# Patient Record
Sex: FEMALE | Race: OTHER | Employment: UNEMPLOYED | URBAN - METROPOLITAN AREA
[De-identification: names, ages, dates, MRNs, and addresses within clinical notes are randomized per-mention and may not be internally consistent; named-entity substitution may affect disease eponyms.]

---

## 2017-01-31 ENCOUNTER — APPOINTMENT (OUTPATIENT)
Dept: ULTRASOUND IMAGING | Age: 27
End: 2017-01-31
Attending: EMERGENCY MEDICINE
Payer: SELF-PAY

## 2017-01-31 ENCOUNTER — HOSPITAL ENCOUNTER (EMERGENCY)
Age: 27
Discharge: HOME | End: 2017-02-01
Attending: EMERGENCY MEDICINE | Admitting: EMERGENCY MEDICINE
Payer: SELF-PAY

## 2017-01-31 ENCOUNTER — APPOINTMENT (OUTPATIENT)
Dept: CT IMAGING | Age: 27
End: 2017-01-31
Attending: EMERGENCY MEDICINE
Payer: SELF-PAY

## 2017-01-31 DIAGNOSIS — R10.84 ABDOMINAL PAIN, GENERALIZED: Primary | ICD-10-CM

## 2017-01-31 DIAGNOSIS — R11.0 NAUSEA WITHOUT VOMITING: ICD-10-CM

## 2017-01-31 LAB
ALBUMIN SERPL BCP-MCNC: 4.1 G/DL (ref 3.5–5)
ALBUMIN/GLOB SERPL: 1 {RATIO} (ref 1.1–2.2)
ALP SERPL-CCNC: 104 U/L (ref 45–117)
ALT SERPL-CCNC: 30 U/L (ref 12–78)
ANION GAP BLD CALC-SCNC: 8 MMOL/L (ref 5–15)
APPEARANCE UR: CLEAR
AST SERPL W P-5'-P-CCNC: 16 U/L (ref 15–37)
BACTERIA URNS QL MICRO: NEGATIVE /HPF
BASOPHILS # BLD AUTO: 0 K/UL (ref 0–0.1)
BASOPHILS # BLD: 0 % (ref 0–1)
BILIRUB SERPL-MCNC: 0.3 MG/DL (ref 0.2–1)
BILIRUB UR QL: NEGATIVE
BUN SERPL-MCNC: 12 MG/DL (ref 6–20)
BUN/CREAT SERPL: 13 (ref 12–20)
CALCIUM SERPL-MCNC: 9.2 MG/DL (ref 8.5–10.1)
CAOX CRY URNS QL MICRO: ABNORMAL
CHLORIDE SERPL-SCNC: 104 MMOL/L (ref 97–108)
CO2 SERPL-SCNC: 26 MMOL/L (ref 21–32)
COLOR UR: ABNORMAL
CREAT SERPL-MCNC: 0.9 MG/DL (ref 0.55–1.02)
EOSINOPHIL # BLD: 0.1 K/UL (ref 0–0.4)
EOSINOPHIL NFR BLD: 1 % (ref 0–7)
EPITH CASTS URNS QL MICRO: ABNORMAL /LPF
ERYTHROCYTE [DISTWIDTH] IN BLOOD BY AUTOMATED COUNT: 13.4 % (ref 11.5–14.5)
GLOBULIN SER CALC-MCNC: 4.1 G/DL (ref 2–4)
GLUCOSE SERPL-MCNC: 95 MG/DL (ref 65–100)
GLUCOSE UR STRIP.AUTO-MCNC: NEGATIVE MG/DL
HCG UR QL: NEGATIVE
HCT VFR BLD AUTO: 44.6 % (ref 35–47)
HGB BLD-MCNC: 15.2 G/DL (ref 11.5–16)
HGB UR QL STRIP: NEGATIVE
KETONES UR QL STRIP.AUTO: NEGATIVE MG/DL
LEUKOCYTE ESTERASE UR QL STRIP.AUTO: NEGATIVE
LIPASE SERPL-CCNC: 198 U/L (ref 73–393)
LYMPHOCYTES # BLD AUTO: 31 % (ref 12–49)
LYMPHOCYTES # BLD: 2.8 K/UL (ref 0.8–3.5)
MCH RBC QN AUTO: 29.9 PG (ref 26–34)
MCHC RBC AUTO-ENTMCNC: 34.1 G/DL (ref 30–36.5)
MCV RBC AUTO: 87.6 FL (ref 80–99)
MONOCYTES # BLD: 0.6 K/UL (ref 0–1)
MONOCYTES NFR BLD AUTO: 7 % (ref 5–13)
NEUTS SEG # BLD: 5.6 K/UL (ref 1.8–8)
NEUTS SEG NFR BLD AUTO: 61 % (ref 32–75)
NITRITE UR QL STRIP.AUTO: NEGATIVE
PH UR STRIP: 6 [PH] (ref 5–8)
PLATELET # BLD AUTO: 211 K/UL (ref 150–400)
POTASSIUM SERPL-SCNC: 3.6 MMOL/L (ref 3.5–5.1)
PROT SERPL-MCNC: 8.2 G/DL (ref 6.4–8.2)
PROT UR STRIP-MCNC: NEGATIVE MG/DL
RBC # BLD AUTO: 5.09 M/UL (ref 3.8–5.2)
RBC #/AREA URNS HPF: ABNORMAL /HPF (ref 0–5)
S PYO AG THROAT QL: NEGATIVE
SODIUM SERPL-SCNC: 138 MMOL/L (ref 136–145)
SP GR UR REFRACTOMETRY: 1.03 (ref 1–1.03)
UA: UC IF INDICATED,UAUC: ABNORMAL
UROBILINOGEN UR QL STRIP.AUTO: 0.2 EU/DL (ref 0.2–1)
WBC # BLD AUTO: 9.1 K/UL (ref 3.6–11)
WBC URNS QL MICRO: ABNORMAL /HPF (ref 0–4)

## 2017-01-31 PROCEDURE — 87880 STREP A ASSAY W/OPTIC: CPT

## 2017-01-31 PROCEDURE — 96361 HYDRATE IV INFUSION ADD-ON: CPT

## 2017-01-31 PROCEDURE — 74011250636 HC RX REV CODE- 250/636: Performed by: EMERGENCY MEDICINE

## 2017-01-31 PROCEDURE — 99284 EMERGENCY DEPT VISIT MOD MDM: CPT

## 2017-01-31 PROCEDURE — 83690 ASSAY OF LIPASE: CPT | Performed by: EMERGENCY MEDICINE

## 2017-01-31 PROCEDURE — 87147 CULTURE TYPE IMMUNOLOGIC: CPT | Performed by: EMERGENCY MEDICINE

## 2017-01-31 PROCEDURE — 96374 THER/PROPH/DIAG INJ IV PUSH: CPT

## 2017-01-31 PROCEDURE — 85025 COMPLETE CBC W/AUTO DIFF WBC: CPT | Performed by: EMERGENCY MEDICINE

## 2017-01-31 PROCEDURE — 36415 COLL VENOUS BLD VENIPUNCTURE: CPT | Performed by: EMERGENCY MEDICINE

## 2017-01-31 PROCEDURE — 76830 TRANSVAGINAL US NON-OB: CPT

## 2017-01-31 PROCEDURE — 81001 URINALYSIS AUTO W/SCOPE: CPT | Performed by: EMERGENCY MEDICINE

## 2017-01-31 PROCEDURE — 87070 CULTURE OTHR SPECIMN AEROBIC: CPT | Performed by: EMERGENCY MEDICINE

## 2017-01-31 PROCEDURE — 74011000258 HC RX REV CODE- 258: Performed by: EMERGENCY MEDICINE

## 2017-01-31 PROCEDURE — 74177 CT ABD & PELVIS W/CONTRAST: CPT

## 2017-01-31 PROCEDURE — 74011636320 HC RX REV CODE- 636/320: Performed by: EMERGENCY MEDICINE

## 2017-01-31 PROCEDURE — 76856 US EXAM PELVIC COMPLETE: CPT

## 2017-01-31 PROCEDURE — 81025 URINE PREGNANCY TEST: CPT

## 2017-01-31 PROCEDURE — 96375 TX/PRO/DX INJ NEW DRUG ADDON: CPT

## 2017-01-31 PROCEDURE — 80053 COMPREHEN METABOLIC PANEL: CPT | Performed by: EMERGENCY MEDICINE

## 2017-01-31 RX ORDER — SODIUM CHLORIDE 0.9 % (FLUSH) 0.9 %
10 SYRINGE (ML) INJECTION
Status: COMPLETED | OUTPATIENT
Start: 2017-01-31 | End: 2017-01-31

## 2017-01-31 RX ORDER — MORPHINE SULFATE 4 MG/ML
4 INJECTION, SOLUTION INTRAMUSCULAR; INTRAVENOUS
Status: COMPLETED | OUTPATIENT
Start: 2017-01-31 | End: 2017-01-31

## 2017-01-31 RX ORDER — MEDROXYPROGESTERONE ACETATE 150 MG/ML
150 INJECTION, SUSPENSION INTRAMUSCULAR ONCE
COMMUNITY
End: 2017-08-22 | Stop reason: ALTCHOICE

## 2017-01-31 RX ORDER — KETOROLAC TROMETHAMINE 30 MG/ML
30 INJECTION, SOLUTION INTRAMUSCULAR; INTRAVENOUS
Status: COMPLETED | OUTPATIENT
Start: 2017-01-31 | End: 2017-01-31

## 2017-01-31 RX ORDER — METOCLOPRAMIDE HYDROCHLORIDE 5 MG/ML
10 INJECTION INTRAMUSCULAR; INTRAVENOUS
Status: COMPLETED | OUTPATIENT
Start: 2017-01-31 | End: 2017-01-31

## 2017-01-31 RX ORDER — ONDANSETRON 2 MG/ML
8 INJECTION INTRAMUSCULAR; INTRAVENOUS ONCE
Status: COMPLETED | OUTPATIENT
Start: 2017-01-31 | End: 2017-01-31

## 2017-01-31 RX ADMIN — METOCLOPRAMIDE 10 MG: 5 INJECTION, SOLUTION INTRAMUSCULAR; INTRAVENOUS at 23:22

## 2017-01-31 RX ADMIN — Medication 4 MG: at 23:22

## 2017-01-31 RX ADMIN — Medication 10 ML: at 22:29

## 2017-01-31 RX ADMIN — KETOROLAC TROMETHAMINE 30 MG: 30 INJECTION, SOLUTION INTRAMUSCULAR at 22:12

## 2017-01-31 RX ADMIN — SODIUM CHLORIDE 100 ML: 900 INJECTION, SOLUTION INTRAVENOUS at 22:29

## 2017-01-31 RX ADMIN — IOPAMIDOL 100 ML: 755 INJECTION, SOLUTION INTRAVENOUS at 22:29

## 2017-01-31 RX ADMIN — SODIUM CHLORIDE 1000 ML: 900 INJECTION, SOLUTION INTRAVENOUS at 22:12

## 2017-01-31 RX ADMIN — ONDANSETRON 8 MG: 2 INJECTION INTRAMUSCULAR; INTRAVENOUS at 22:12

## 2017-01-31 NOTE — Clinical Note
Norco:1 pill every 6 hours for pain. Phenergan:1 pill every 6 hours for nausea if needed. Be aware of sedating effects of phenergan and norco. No alcohol or driving with this medicine.  
Return to ER for any worsening of pain, inability to eat or drink , vomiting, fever

## 2017-02-01 VITALS
SYSTOLIC BLOOD PRESSURE: 115 MMHG | HEART RATE: 64 BPM | OXYGEN SATURATION: 99 % | WEIGHT: 175 LBS | DIASTOLIC BLOOD PRESSURE: 78 MMHG | BODY MASS INDEX: 32.2 KG/M2 | HEIGHT: 62 IN | RESPIRATION RATE: 16 BRPM | TEMPERATURE: 98.6 F

## 2017-02-01 RX ORDER — HYDROCODONE BITARTRATE AND ACETAMINOPHEN 5; 325 MG/1; MG/1
1 TABLET ORAL
Qty: 6 TAB | Refills: 0 | Status: SHIPPED | OUTPATIENT
Start: 2017-02-01 | End: 2017-08-22 | Stop reason: ALTCHOICE

## 2017-02-01 RX ORDER — PROMETHAZINE HYDROCHLORIDE 25 MG/1
25 TABLET ORAL
Qty: 9 TAB | Refills: 0 | Status: SHIPPED | OUTPATIENT
Start: 2017-02-01 | End: 2017-08-22 | Stop reason: ALTCHOICE

## 2017-02-01 NOTE — ED PROVIDER NOTES
HPI Comments: 50-year-old female presents emergency department for evaluation of abdominal pain. The pain has been present since yesterday. Pain waxes and wanes in intensity but has been constant. Pain is described as sharp. Pain is located around the umbilicus. It does not radiate. Patient has associated nausea but no vomiting. No diarrhea no dysuria, frequency or urgency. No vaginal bleeding or vaginal discharge. No fever or chills. No cough or runny nose. Patient does admit to a sore throat. Patient has tried Tylenol with no relief. No aggravating factors. No known sick contacts. Pain rated 10/10. Social hx  Nonsmoker  No alcohol      Patient is a 33 y.o. female presenting with abdominal pain. The history is provided by the patient. Abdominal Pain    Associated symptoms include nausea. Pertinent negatives include no fever, no diarrhea, no vomiting, no dysuria, no frequency, no headaches, no arthralgias, no myalgias and no chest pain. Past Medical History:   Diagnosis Date    Cholestasis of pregnancy in third trimester 11/20/2015    Constipation     Gestational diabetes 9/25/2015    Hypertension      possibly per pt report in [place]. Denied it 5/5/15       History reviewed. No pertinent past surgical history. Family History:   Problem Relation Age of Onset    Diabetes Mother        Social History     Social History    Marital status: SINGLE     Spouse name: N/A    Number of children: N/A    Years of education: N/A     Occupational History    Not on file.      Social History Main Topics    Smoking status: Never Smoker    Smokeless tobacco: Never Used    Alcohol use No    Drug use: No    Sexual activity: Yes     Partners: Male     Birth control/ protection: None, Condom     Other Topics Concern    Not on file     Social History Narrative    ** Merged History Encounter **         ** Merged History Encounter **            ALLERGIES: Review of patient's allergies indicates no known allergies. Review of Systems   Constitutional: Negative for chills and fever. HENT: Positive for sore throat. Negative for congestion, postnasal drip and rhinorrhea. Respiratory: Negative for cough and shortness of breath. Cardiovascular: Negative for chest pain and palpitations. Gastrointestinal: Positive for abdominal pain and nausea. Negative for blood in stool, diarrhea and vomiting. Genitourinary: Negative for dysuria, flank pain, frequency, urgency, vaginal bleeding and vaginal discharge. Musculoskeletal: Negative for arthralgias, myalgias, neck pain and neck stiffness. Skin: Negative for rash and wound. Neurological: Negative for dizziness, numbness and headaches. All other systems reviewed and are negative. Vitals:    01/31/17 2113   BP: 139/86   Pulse: 66   Resp: 20   Temp: 98.5 °F (36.9 °C)   SpO2: 99%   Weight: 79.4 kg (175 lb)   Height: 5' 2\" (1.575 m)            Physical Exam   Constitutional: She is oriented to person, place, and time. She appears well-developed and well-nourished. No distress. HENT:   Head: Normocephalic and atraumatic. Right Ear: External ear normal.   Left Ear: External ear normal.   Nose: Nose normal.   Mouth/Throat: Oropharynx is clear and moist. No oropharyngeal exudate. UVULA MIDLINE, NO TRISMUS, NO DROOLING, NO SUBMANDIBULAR SWELLING, NO TONSILLAR HYPERTROPHY OR EXUDATES, NO MASTOID TENDERNESS, + ERYTHEMA TO POSTERIOR PHARYNX.  PT TOLERATING SECRETIONS. PT ABLE TO SWALLOW WITHOUT PROBLEM. Eyes: Conjunctivae and EOM are normal. Pupils are equal, round, and reactive to light. Neck: Normal range of motion. Neck supple. Cardiovascular: Normal rate and regular rhythm. Pulmonary/Chest: Effort normal and breath sounds normal. No respiratory distress. She has no wheezes. Abdominal: Soft.  Normal appearance and bowel sounds are normal. She exhibits no shifting dullness, no distension, no pulsatile liver, no abdominal bruit, no pulsatile midline mass and no mass. There is no hepatosplenomegaly, splenomegaly or hepatomegaly. There is tenderness. There is no rigidity, no rebound, no guarding, no CVA tenderness, no tenderness at McBurney's point and negative 's sign. Abdomen exposed for exam.  Soft, no peritoneal signs  Tender over umbilicus. -mild   Musculoskeletal: Normal range of motion. She exhibits no edema or tenderness. Lymphadenopathy:     She has no cervical adenopathy. Neurological: She is alert and oriented to person, place, and time. She has normal reflexes. No cranial nerve deficit. She exhibits normal muscle tone. Coordination normal.   Skin: Skin is warm and dry. No rash noted. No erythema. Psychiatric: She has a normal mood and affect. Her behavior is normal. Judgment and thought content normal.   Nursing note and vitals reviewed. MDM  Number of Diagnoses or Management Options  Abdominal pain, generalized:   Nausea without vomiting:   Diagnosis management comments:  34 yo female with periumbilical abdominal pain. Abdomen is soft with no peritoneal signs. She has some mild tenderness above the umbilicus. She is afebrile she is nontoxic appearing. Plan: CT, labs, urinalysis intravenous fluids, pain medicine and nausea medicine    12:43 AM  Pt reevaluated. Pt feeling much better. Patient is well hydrated, well appearing, and in no respiratory distress. Physical exam is reassuring, and without signs of serious illness. Given the patient's history, clinical course, physical exam, and imaging findings, abdominal pain is unlikely secondary to a surgical etiology. Patient will be discharged home with pain control and follow-up with primary care physician in one to two days. Patient and caregivers were instructed on signs and symptoms of reasons to return including fever, worsening pain, vomiting, blood in the stool or any other concerns.     Standard narcotic and sedating medication warnings given  Patient's results have been reviewed with them. Patient and/or family have verbally conveyed their understanding and agreement of the patient's signs, symptoms, diagnosis, treatment and prognosis and additionally agree to follow up as recommended or return to the Emergency Room should their condition change prior to follow-up. Discharge instructions have also been provided to the patient with some educational information regarding their diagnosis as well a list of reasons why they would want to return to the ER prior to their follow-up appointment should their condition change. Amount and/or Complexity of Data Reviewed  Discuss the patient with other providers: yes (ER attending-[facility])    Patient Progress  Patient progress: stable    ED Course       Procedures           Pt case including HPI, PE, and all available lab and radiology results has been discussed with attending physician. Opportunity to evaluate patient has been provided to ER attending. Discharge and prescription plan has been agreed upon.

## 2017-02-01 NOTE — ED NOTES
Assumed care, verbal and [facility] . Pt resting comfortably in bed,  alert and oriented x 4 with no complaints at this time.

## 2017-02-01 NOTE — ED NOTES
Pt discharged from ED with prescription(s) and follow up care instructions; pt and companions verbalized understanding. VSS. NAD. Pt reports no pain at discharge. PT ambulatory from department with steady gait.

## 2017-02-01 NOTE — ED TRIAGE NOTES
Using , pt reports lower abdominal pain and cramping since this morning. Denies urinary or vaginal complaints. Took Tylenol prior to arrival.  Pt is due to for her period, does not believe she is pregnant. Nausea, no vomiting or diarrhea.  Last BM this morning

## 2017-02-02 LAB
BACTERIA SPEC CULT: NORMAL
SERVICE CMNT-IMP: NORMAL

## 2017-02-02 NOTE — ED NOTES
Pharmacy called stating that the pt strep culture came back positive. Pt called and informed of the results and states that she will go to pharmacy to  rx.   Janee Rodriguez PA-C

## 2017-07-09 ENCOUNTER — APPOINTMENT (OUTPATIENT)
Dept: ULTRASOUND IMAGING | Age: 27
End: 2017-07-09
Attending: PHYSICIAN ASSISTANT
Payer: SELF-PAY

## 2017-07-09 ENCOUNTER — HOSPITAL ENCOUNTER (EMERGENCY)
Age: 27
Discharge: HOME | End: 2017-07-10
Attending: EMERGENCY MEDICINE
Payer: SELF-PAY

## 2017-07-09 DIAGNOSIS — R10.2 PELVIC PAIN: Primary | ICD-10-CM

## 2017-07-09 LAB
ALBUMIN SERPL BCP-MCNC: 4.3 G/DL (ref 3.5–5)
ALBUMIN/GLOB SERPL: 1.3 {RATIO} (ref 1.1–2.2)
ALP SERPL-CCNC: 102 U/L (ref 45–117)
ALT SERPL-CCNC: 19 U/L (ref 12–78)
ANION GAP BLD CALC-SCNC: 8 MMOL/L (ref 5–15)
APPEARANCE UR: ABNORMAL
AST SERPL W P-5'-P-CCNC: 14 U/L (ref 15–37)
BACTERIA URNS QL MICRO: NEGATIVE /HPF
BASOPHILS # BLD AUTO: 0 K/UL (ref 0–0.1)
BASOPHILS # BLD: 0 % (ref 0–1)
BILIRUB SERPL-MCNC: 0.2 MG/DL (ref 0.2–1)
BILIRUB UR QL: NEGATIVE
BUN SERPL-MCNC: 15 MG/DL (ref 6–20)
BUN/CREAT SERPL: 24 (ref 12–20)
CALCIUM SERPL-MCNC: 8.6 MG/DL (ref 8.5–10.1)
CHLORIDE SERPL-SCNC: 106 MMOL/L (ref 97–108)
CO2 SERPL-SCNC: 25 MMOL/L (ref 21–32)
COLOR UR: ABNORMAL
CREAT SERPL-MCNC: 0.62 MG/DL (ref 0.55–1.02)
EOSINOPHIL # BLD: 0.1 K/UL (ref 0–0.4)
EOSINOPHIL NFR BLD: 2 % (ref 0–7)
EPITH CASTS URNS QL MICRO: ABNORMAL /LPF
ERYTHROCYTE [DISTWIDTH] IN BLOOD BY AUTOMATED COUNT: 12.7 % (ref 11.5–14.5)
GLOBULIN SER CALC-MCNC: 3.3 G/DL (ref 2–4)
GLUCOSE SERPL-MCNC: 100 MG/DL (ref 65–100)
GLUCOSE UR STRIP.AUTO-MCNC: NEGATIVE MG/DL
HCG UR QL: NEGATIVE
HCT VFR BLD AUTO: 41.5 % (ref 35–47)
HGB BLD-MCNC: 13.8 G/DL (ref 11.5–16)
HGB UR QL STRIP: NEGATIVE
HYALINE CASTS URNS QL MICRO: ABNORMAL /LPF (ref 0–5)
KETONES UR QL STRIP.AUTO: NEGATIVE MG/DL
LEUKOCYTE ESTERASE UR QL STRIP.AUTO: NEGATIVE
LYMPHOCYTES # BLD AUTO: 43 % (ref 12–49)
LYMPHOCYTES # BLD: 3.1 K/UL (ref 0.8–3.5)
MCH RBC QN AUTO: 29.2 PG (ref 26–34)
MCHC RBC AUTO-ENTMCNC: 33.3 G/DL (ref 30–36.5)
MCV RBC AUTO: 87.7 FL (ref 80–99)
MONOCYTES # BLD: 0.4 K/UL (ref 0–1)
MONOCYTES NFR BLD AUTO: 6 % (ref 5–13)
NEUTS SEG # BLD: 3.6 K/UL (ref 1.8–8)
NEUTS SEG NFR BLD AUTO: 49 % (ref 32–75)
NITRITE UR QL STRIP.AUTO: NEGATIVE
PH UR STRIP: 6 [PH] (ref 5–8)
PLATELET # BLD AUTO: 217 K/UL (ref 150–400)
POTASSIUM SERPL-SCNC: 3.7 MMOL/L (ref 3.5–5.1)
PROT SERPL-MCNC: 7.6 G/DL (ref 6.4–8.2)
PROT UR STRIP-MCNC: NEGATIVE MG/DL
RBC # BLD AUTO: 4.73 M/UL (ref 3.8–5.2)
RBC #/AREA URNS HPF: ABNORMAL /HPF (ref 0–5)
SODIUM SERPL-SCNC: 139 MMOL/L (ref 136–145)
SP GR UR REFRACTOMETRY: 1.01 (ref 1–1.03)
UROBILINOGEN UR QL STRIP.AUTO: 0.2 EU/DL (ref 0.2–1)
WBC # BLD AUTO: 7.2 K/UL (ref 3.6–11)
WBC URNS QL MICRO: ABNORMAL /HPF (ref 0–4)

## 2017-07-09 PROCEDURE — 96361 HYDRATE IV INFUSION ADD-ON: CPT

## 2017-07-09 PROCEDURE — 80053 COMPREHEN METABOLIC PANEL: CPT | Performed by: PHYSICIAN ASSISTANT

## 2017-07-09 PROCEDURE — 74011250636 HC RX REV CODE- 250/636: Performed by: PHYSICIAN ASSISTANT

## 2017-07-09 PROCEDURE — 76856 US EXAM PELVIC COMPLETE: CPT

## 2017-07-09 PROCEDURE — 81025 URINE PREGNANCY TEST: CPT

## 2017-07-09 PROCEDURE — 96375 TX/PRO/DX INJ NEW DRUG ADDON: CPT

## 2017-07-09 PROCEDURE — 36415 COLL VENOUS BLD VENIPUNCTURE: CPT | Performed by: PHYSICIAN ASSISTANT

## 2017-07-09 PROCEDURE — 99283 EMERGENCY DEPT VISIT LOW MDM: CPT

## 2017-07-09 PROCEDURE — 81001 URINALYSIS AUTO W/SCOPE: CPT | Performed by: EMERGENCY MEDICINE

## 2017-07-09 PROCEDURE — 76830 TRANSVAGINAL US NON-OB: CPT

## 2017-07-09 PROCEDURE — 96374 THER/PROPH/DIAG INJ IV PUSH: CPT

## 2017-07-09 PROCEDURE — 85025 COMPLETE CBC W/AUTO DIFF WBC: CPT | Performed by: PHYSICIAN ASSISTANT

## 2017-07-09 RX ORDER — KETOROLAC TROMETHAMINE 30 MG/ML
30 INJECTION, SOLUTION INTRAMUSCULAR; INTRAVENOUS
Status: COMPLETED | OUTPATIENT
Start: 2017-07-09 | End: 2017-07-09

## 2017-07-09 RX ADMIN — KETOROLAC TROMETHAMINE 30 MG: 30 INJECTION, SOLUTION INTRAMUSCULAR at 23:23

## 2017-07-09 RX ADMIN — SODIUM CHLORIDE 1000 ML: 900 INJECTION, SOLUTION INTRAVENOUS at 23:23

## 2017-07-10 ENCOUNTER — APPOINTMENT (OUTPATIENT)
Dept: CT IMAGING | Age: 27
End: 2017-07-10
Attending: PHYSICIAN ASSISTANT
Payer: SELF-PAY

## 2017-07-10 VITALS
OXYGEN SATURATION: 98 % | SYSTOLIC BLOOD PRESSURE: 118 MMHG | BODY MASS INDEX: 31.36 KG/M2 | WEIGHT: 188.2 LBS | HEIGHT: 65 IN | HEART RATE: 70 BPM | RESPIRATION RATE: 18 BRPM | TEMPERATURE: 98.1 F | DIASTOLIC BLOOD PRESSURE: 77 MMHG

## 2017-07-10 LAB
C TRACH DNA SPEC QL NAA+PROBE: NEGATIVE
CLUE CELLS VAG QL WET PREP: NORMAL
N GONORRHOEA DNA SPEC QL NAA+PROBE: NEGATIVE
SAMPLE TYPE: NORMAL
SERVICE CMNT-IMP: NORMAL
SPECIMEN SOURCE: NORMAL
T VAGINALIS VAG QL WET PREP: NORMAL

## 2017-07-10 PROCEDURE — 87491 CHLMYD TRACH DNA AMP PROBE: CPT | Performed by: PHYSICIAN ASSISTANT

## 2017-07-10 PROCEDURE — 74011000258 HC RX REV CODE- 258: Performed by: EMERGENCY MEDICINE

## 2017-07-10 PROCEDURE — 74011250636 HC RX REV CODE- 250/636: Performed by: PHYSICIAN ASSISTANT

## 2017-07-10 PROCEDURE — 87210 SMEAR WET MOUNT SALINE/INK: CPT | Performed by: PHYSICIAN ASSISTANT

## 2017-07-10 PROCEDURE — 74011636320 HC RX REV CODE- 636/320: Performed by: EMERGENCY MEDICINE

## 2017-07-10 PROCEDURE — 74177 CT ABD & PELVIS W/CONTRAST: CPT

## 2017-07-10 RX ORDER — NAPROXEN 500 MG/1
500 TABLET ORAL
Qty: 20 TAB | Refills: 0 | Status: SHIPPED | OUTPATIENT
Start: 2017-07-10 | End: 2017-08-22 | Stop reason: ALTCHOICE

## 2017-07-10 RX ORDER — MORPHINE SULFATE 2 MG/ML
4 INJECTION, SOLUTION INTRAMUSCULAR; INTRAVENOUS
Status: COMPLETED | OUTPATIENT
Start: 2017-07-10 | End: 2017-07-10

## 2017-07-10 RX ORDER — SODIUM CHLORIDE 0.9 % (FLUSH) 0.9 %
10 SYRINGE (ML) INJECTION
Status: COMPLETED | OUTPATIENT
Start: 2017-07-10 | End: 2017-07-10

## 2017-07-10 RX ORDER — TRAMADOL HYDROCHLORIDE 50 MG/1
50 TABLET ORAL
Qty: 20 TAB | Refills: 0 | Status: SHIPPED | OUTPATIENT
Start: 2017-07-10 | End: 2017-08-22 | Stop reason: ALTCHOICE

## 2017-07-10 RX ORDER — ONDANSETRON 2 MG/ML
4 INJECTION INTRAMUSCULAR; INTRAVENOUS
Status: COMPLETED | OUTPATIENT
Start: 2017-07-10 | End: 2017-07-10

## 2017-07-10 RX ADMIN — Medication 10 ML: at 02:05

## 2017-07-10 RX ADMIN — ONDANSETRON 4 MG: 2 INJECTION INTRAMUSCULAR; INTRAVENOUS at 01:00

## 2017-07-10 RX ADMIN — IOPAMIDOL 100 ML: 755 INJECTION, SOLUTION INTRAVENOUS at 02:05

## 2017-07-10 RX ADMIN — Medication 4 MG: at 01:00

## 2017-07-10 RX ADMIN — SODIUM CHLORIDE 100 ML: 900 INJECTION, SOLUTION INTRAVENOUS at 02:05

## 2017-07-10 NOTE — DISCHARGE INSTRUCTIONS
Dolor pélvico: Instrucciones de cuidado - [ Pelvic Pain: Care Instructions ]  Instrucciones de cuidado    El dolor pélvico, o dolor en la parte baja del abdomen, puede tener muchas causas. Con frecuencia, el dolor pélvico no es grave y mejora en unos días. Si el dolor continúa o [place], es posible que necesite exámenes y [place]. Hable con garcía médico sobre cualquier síntoma nuevo. Podrían ser señales de un problema grave. La atención de seguimiento es lazarus parte clave de garcía tratamiento y seguridad. Asegúrese de hacer y acudir a todas las citas, y llame a garcía médico si está teniendo problemas. También es lazarus buena idea saber los resultados de los exámenes y mantener lazarus lista de los medicamentos que terrie. ¿Cómo puede cuidarse en el hogar? · Descanse hasta que se sienta mejor. Acuéstese y ponga lazarus almohada debajo de las rodillas para [address] piernas. · [place] abundantes líquidos. Podría descubrir que si terrie sorbos pequeños y frecuentes caen mejor al estómago que si sunita lazarus gran cantidad a la vez. Evite las bebidas carbonatadas o que contengan cafeína, trey las sodas, el té o el café. · Intente comer varias comidas pequeñas, en lugar de 2 o 3 abundantes. Coma alimentos suaves, trey arroz, pan jolynn seco o galletas saladas, bananas (plátanos) y puré de [place]. Evite las comidas grasosas y condimentadas, otras frutas y el alcohol hasta 50 horas después de que desaparezcan los síntomas. · [place] un analgésico (medicamento para el dolor) de venta phillip, trey acetaminofén (Tylenol), ibuprofeno (Advil, Motrin) o naproxeno (Aleve). Nadine y siga todas las instrucciones de la [place]. · No tome dos o más analgésicos al [employer], a menos que el médico se lo haya indicado. Muchos analgésicos contienen acetaminofén, es decir, Tylenol. El exceso de acetaminofén (Tylenol) puede ser dañino. · Puede colocarse lazarus almohadilla térmica, un paño tibio o calor húmedo sobre el abdomen para aliviar el dolor.   ¿Cuándo debe pedir ayuda? Llame al 911 en cualquier momento que considere que necesita atención de [place]. Por ejemplo, llame si:  · Se desmayó (perdió el conocimiento). Llame a garcía médico ahora mismo o busque atención médica inmediata si:  · García dolor empeora. · El dolor comienza a focalizarse en lazarus nguyen del abdomen. · Tiene sangrado vaginal intenso. Intenso significa que empapa las toallas sanitarias o los tampones usuales cada hora, lee 2 o más horas, y elimina coágulos de [place]. · Tiene síntomas nuevos trey fiebre, problemas urinarios o sangrado vaginal inesperado. · Siente mareos o aturdimiento, o que está a punto de [place]. Preste especial atención a los cambios en garcía jean-pierre y asegúrese de comunicarse con garcía médico si:  · No mejora trey se esperaba. ¿Dónde puede encontrar más información en inglés? Claudia Olson a http://negrita-loren.info/. Maria Del Carmen Reyes [id number] en la búsqueda para aprender más acerca de \"Dolor pélvico: Instrucciones de cuidado - [ Pelvic Pain: Care Instructions ]. \"  Revisado: 13 octubre, 2016  Versión del contenido: 11.3  © 7823-0084 Healthwise, Incorporated. Las instrucciones de cuidado fueron adaptadas bajo licencia por [employer] (which disclaims liability or warranty for this information). Si usted tiene [place] afección médica o sobre estas instrucciones, siempre pregunte a garcía profesional de jean-pierre. Healthwise, Incorporated niega toda garantía o responsabilidad por garcía uso de esta información. We hope that we have addressed all of your medical concerns. The examination and treatment you received in the Emergency Department were for an emergent problem and were not intended as complete care. It is important that you follow up with your healthcare provider(s) for ongoing care. If your symptoms worsen or do not improve as expected, and you are unable to reach your usual health care provider(s), you should return to the Emergency Department. Today's healthcare is undergoing tremendous change, and patient satisfaction surveys are one of the many tools to assess the quality of medical care. You may receive a survey from the [employer] regarding your experience in the Emergency Department. I hope that your experience has been completely positive, particularly the medical care that I provided. As such, please participate in the survey; anything less than excellent does not meet my expectations or intentions. [facility] and [facility] participate in nationally recognized quality of care measures. If your blood pressure is greater than 120/80, as reported below, we urge that you seek medical care to address the potential of high blood pressure, commonly known as hypertension. Hypertension can be hereditary or can be caused by certain medical conditions, pain, stress, or \"white coat syndrome. \"       Please make an appointment with your health care provider(s) for follow up of your Emergency Department visit. VITALS:   Patient Vitals for the past 8 hrs:   Temp Pulse Resp BP SpO2   07/09/17 2212 98.1 °F (36.7 °C) (!) 53 18 124/89 99 %          Thank you for allowing us to provide you with medical care today. We realize that you have many choices for your emergency care needs. Please choose us in the future for any continued health care needs. Laweragudelia Gibbonsela, [address] [place] [address] 20.   Office: [phone]            Recent Results (from the past 24 hour(s))   URINALYSIS W/MICROSCOPIC    Collection Time: 07/09/17 11:14 PM   Result Value Ref Range    Color YELLOW/STRAW      Appearance CLOUDY (A) CLEAR      Specific gravity 1.015 1.003 - 1.030      pH (UA) 6.0 5.0 - 8.0      Protein NEGATIVE  NEG mg/dL    Glucose NEGATIVE  NEG mg/dL    Ketone NEGATIVE  NEG mg/dL    Bilirubin NEGATIVE  NEG      Blood NEGATIVE  NEG      Urobilinogen 0.2 0.2 - 1.0 EU/dL    Nitrites NEGATIVE  NEG      Leukocyte Esterase NEGATIVE  NEG      WBC 0-4 0 - 4 /hpf    RBC 0-5 0 - 5 /hpf    Epithelial cells FEW FEW /lpf    Bacteria NEGATIVE  NEG /hpf    Hyaline cast 0-2 0 - 5 /lpf   CBC WITH AUTOMATED DIFF    Collection Time: 07/09/17 11:14 PM   Result Value Ref Range    WBC 7.2 3.6 - 11.0 K/uL    RBC 4.73 3.80 - 5.20 M/uL    HGB 13.8 11.5 - 16.0 g/dL    HCT 41.5 35.0 - 47.0 %    MCV 87.7 80.0 - 99.0 FL    MCH 29.2 26.0 - 34.0 PG    MCHC 33.3 30.0 - 36.5 g/dL    RDW 12.7 11.5 - 14.5 %    PLATELET 978 567 - 160 K/uL    NEUTROPHILS 49 32 - 75 %    LYMPHOCYTES 43 12 - 49 %    MONOCYTES 6 5 - 13 %    EOSINOPHILS 2 0 - 7 %    BASOPHILS 0 0 - 1 %    ABS. NEUTROPHILS 3.6 1.8 - 8.0 K/UL    ABS. LYMPHOCYTES 3.1 0.8 - 3.5 K/UL    ABS. MONOCYTES 0.4 0.0 - 1.0 K/UL    ABS. EOSINOPHILS 0.1 0.0 - 0.4 K/UL    ABS. BASOPHILS 0.0 0.0 - 0.1 K/UL   METABOLIC PANEL, COMPREHENSIVE    Collection Time: 07/09/17 11:14 PM   Result Value Ref Range    Sodium 139 136 - 145 mmol/L    Potassium 3.7 3.5 - 5.1 mmol/L    Chloride 106 97 - 108 mmol/L    CO2 25 21 - 32 mmol/L    Anion gap 8 5 - 15 mmol/L    Glucose 100 65 - 100 mg/dL    BUN 15 6 - 20 MG/DL    Creatinine 0.62 0.55 - 1.02 MG/DL    BUN/Creatinine ratio 24 (H) 12 - 20      GFR est AA >60 >60 ml/min/1.73m2    GFR est non-AA >60 >60 ml/min/1.73m2    Calcium 8.6 8.5 - 10.1 MG/DL    Bilirubin, total 0.2 0.2 - 1.0 MG/DL    ALT (SGPT) 19 12 - 78 U/L    AST (SGOT) 14 (L) 15 - 37 U/L    Alk.  phosphatase 102 45 - 117 U/L    Protein, total 7.6 6.4 - 8.2 g/dL    Albumin 4.3 3.5 - 5.0 g/dL    Globulin 3.3 2.0 - 4.0 g/dL    A-G Ratio 1.3 1.1 - 2.2     HCG URINE, QL. - POC    Collection Time: 07/09/17 11:25 PM   Result Value Ref Range    Pregnancy test,urine (POC) NEGATIVE  NEG     WET PREP    Collection Time: 07/10/17 12:36 AM   Result Value Ref Range    Clue cells CLUE CELLS ABSENT      Wet prep NO TRICHOMONAS SEEN         Ct Abd Pelv W Cont    Result Date: 7/10/2017  EXAM:  CT ABD PELV W CONT INDICATION: Lower pelvic pain for one day  COMPARISON: January 31, 2017 TECHNIQUE: Following the uneventful intravenous administration of 100 cc Isovue-370, thin axial images were obtained through the abdomen and pelvis. Coronal and sagittal reconstructions were generated. Oral contrast was not administered. CT dose reduction was achieved through use of a standardized protocol tailored for this examination and automatic exposure control for dose modulation. FINDINGS: LUNG BASES: Clear. INCIDENTALLY IMAGED HEART AND MEDIASTINUM: Unremarkable. LIVER: No mass or biliary dilatation. GALLBLADDER: Unremarkable. SPLEEN: No mass. PANCREAS: No mass or ductal dilatation. ADRENALS: Unremarkable. KIDNEYS: No mass, calculus, or hydronephrosis. STOMACH: Small hiatal hernia. SMALL BOWEL: No dilatation or wall thickening. COLON: No dilatation or wall thickening. APPENDIX: Normal. PERITONEUM: No ascites or pneumoperitoneum. RETROPERITONEUM: No lymphadenopathy or aortic aneurysm. REPRODUCTIVE ORGANS: Retroverted uterus containing an intrauterine device. Normal ovaries. URINARY BLADDER: No mass or calculus. BONES: No destructive bone lesion. ADDITIONAL COMMENTS: N/A     IMPRESSION: No evidence of acute abdominal or pelvic process. Small hiatal hernia. Intrauterine device within the uterus. Us Transvaginal    Result Date: 7/10/2017  INDICATION: Pelvic pain. Intrauterine device in place. TECHNIQUE: Routine transpelvic ultrasound images were obtained as well as transvaginal images for better definition of the uterus and adnexa. FINDINGS: The transpelvic images demonstrate a distended urinary bladder without evidence of filling defect. The uterus measures 7.6 x 3.4 x 4.4 cm. Myometrial echogenicity is normal. The endometrial stripe measures 5 mm by transpelvic technique. There is an intrauterine device in the endometrial canal. The ovaries are obscured by overlying bowel gas.  No pelvic mass or free fluid is seen Transvaginal images demonstrate a normal endometrial stripe measuring 4 mm in thickness. There is an intrauterine device within the endometrial canal. No myometrial mass is seen. The cervix has a normal appearance. The right ovary is obscured by intervening bowel gas. The left ovary measures 2.5 x 2.3 x 1.9 cm; it demonstrates normal vascularity on color Doppler imaging. No adnexal mass or pathological cyst is seen. There is no pelvic free fluid. IMPRESSION: Normal uterus and endometrial stripe. Intrauterine device lies within the endometrial canal. Normal left ovary. Nonvisualization of the right ovary. No evidence of pelvic mass or free fluid. Us Pelv Non Obs    Result Date: 7/10/2017  INDICATION: Pelvic pain. Intrauterine device in place. TECHNIQUE: Routine transpelvic ultrasound images were obtained as well as transvaginal images for better definition of the uterus and adnexa. FINDINGS: The transpelvic images demonstrate a distended urinary bladder without evidence of filling defect. The uterus measures 7.6 x 3.4 x 4.4 cm. Myometrial echogenicity is normal. The endometrial stripe measures 5 mm by transpelvic technique. There is an intrauterine device in the endometrial canal. The ovaries are obscured by overlying bowel gas. No pelvic mass or free fluid is seen Transvaginal images demonstrate a normal endometrial stripe measuring 4 mm in thickness. There is an intrauterine device within the endometrial canal. No myometrial mass is seen. The cervix has a normal appearance. The right ovary is obscured by intervening bowel gas. The left ovary measures 2.5 x 2.3 x 1.9 cm; it demonstrates normal vascularity on color Doppler imaging. No adnexal mass or pathological cyst is seen. There is no pelvic free fluid. IMPRESSION: Normal uterus and endometrial stripe. Intrauterine device lies within the endometrial canal. Normal left ovary. Nonvisualization of the right ovary.  No evidence of pelvic mass or free fluid.

## 2017-07-10 NOTE — ED PROVIDER NOTES
HPI Comments: 33 y.o. female with past medical history significant for HTN, gestational DM, and cholestasis of pregnancy in third trimester who presents to the ED with chief complaint of pelvic pain. Patient reports pelvic pain, bilateral lower back pain, nausea, and \"two\" episodes of vomiting with onset at ~5640-7046 \"this afternoon. \" Patient reports taking Ibuprofen for her pain. Patient denies a history of similar symptoms. Patient reports having an IUD placed in April of 2017; denies any problems with it since. Patient reports her LMP was ~5 days ago; reports it was \"normal.\" Patient denies diarrhea, fever, chills, upper abdominal pain, dysuria, vaginal discharge, cough, chest pain, and SOB. There are no other acute medical concerns at this time. PCP: Cortney Hernandez MD    Note written by Satnam Breen, as dictated by KELLY Farley 11:13 PM     The history is provided by the patient. Past Medical History:   Diagnosis Date    Cholestasis of pregnancy in third trimester 11/20/2015    Constipation     Gestational diabetes 9/25/2015    Hypertension     possibly per pt report in [place]. Denied it 5/5/15       History reviewed. No pertinent surgical history. Family History:   Problem Relation Age of Onset    Diabetes Mother        Social History     Social History    Marital status: SINGLE     Spouse name: N/A    Number of children: N/A    Years of education: N/A     Occupational History    Not on file. Social History Main Topics    Smoking status: Never Smoker    Smokeless tobacco: Never Used    Alcohol use No    Drug use: No    Sexual activity: Yes     Partners: Male     Birth control/ protection: None, Condom     Other Topics Concern    Not on file     Social History Narrative    ** Merged History Encounter **         ** Merged History Encounter **            ALLERGIES: Review of patient's allergies indicates no known allergies.     Review of Systems Constitutional: Negative. Negative for chills and fever. HENT: Negative for ear discharge. Eyes: Negative for photophobia, pain, discharge and visual disturbance. Respiratory: Negative for apnea, cough, chest tightness and shortness of breath. Cardiovascular: Negative for chest pain, palpitations and leg swelling. Gastrointestinal: Positive for nausea and vomiting. Negative for abdominal distention, abdominal pain and blood in stool. Genitourinary: Positive for pelvic pain. Negative for difficulty urinating, dysuria, flank pain, frequency, hematuria and vaginal discharge. Musculoskeletal: Positive for back pain. Negative for gait problem, joint swelling, myalgias and neck pain. Skin: Negative for color change and pallor. Neurological: Negative for dizziness, syncope, weakness, numbness and headaches. Psychiatric/Behavioral: Negative for behavioral problems and confusion. The patient is not nervous/anxious. Vitals:    07/09/17 2212   BP: 124/89   Pulse: (!) 53   Resp: 18   Temp: 98.1 °F (36.7 °C)   SpO2: 99%   Weight: 85.4 kg (188 lb 3.2 oz)   Height: 5' 5\" (1.651 m)            Physical Exam   Constitutional: She is oriented to person, place, and time. She appears well-developed and well-nourished. She appears distressed. In mild distress. HENT:   Head: Normocephalic and atraumatic. Right Ear: External ear normal.   Left Ear: External ear normal.   Nose: Nose normal.   Mouth/Throat: Oropharynx is clear and moist.   Eyes: Conjunctivae and EOM are normal. Pupils are equal, round, and reactive to light. Right eye exhibits no discharge. Left eye exhibits no discharge. Neck: Normal range of motion. Neck supple. Cardiovascular: Normal rate, regular rhythm, normal heart sounds and intact distal pulses. Pulmonary/Chest: Effort normal and breath sounds normal.   Abdominal: Soft. Bowel sounds are normal. She exhibits no distension. There is tenderness.  There is no rebound and no guarding. Mild suprapubic tenderness. Genitourinary:   Genitourinary Comments: Performed by Robel Solares PA-C. The external vulva and vagina are normal in appearance, without rash, lesions, discharge, ecchymosis or laceration. The speculum exam demonstrates normal vaginal mucosa without rash, lesions, discharge, ecchymosis or laceration. The cervix is normal in appearance without rash, lesions, discharge, ecchymosis or laceration. The bimanual exam demonstrates a(n) closed cervix without cervical motion tenderness. The uterus is not enlarged, and non-tender, without palpable masses. The adenexa are non-tender. KELLY Sanchez     Musculoskeletal: Normal range of motion. She exhibits no edema or tenderness. Neurological: She is alert and oriented to person, place, and time. No cranial nerve deficit. Coordination normal.   Skin: Skin is warm and dry. No rash noted. Psychiatric: She has a normal mood and affect. Her behavior is normal. Judgment and thought content normal.   Nursing note and vitals reviewed. Note written by Satnam Friedman, as dictated by KELLY Sanchez 11:20 PM     MDM  Number of Diagnoses or Management Options     Amount and/or Complexity of Data Reviewed  Clinical lab tests: reviewed and ordered  Tests in the radiology section of CPT®: ordered and reviewed  Discuss the patient with other providers: yes  Independent visualization of images, tracings, or specimens: yes      ED Course       Procedures     Patient has been reassessed. Feeling better. Reviewed labs, medications and radiographics with patient. Ready to discharge home. Discussed case with attending Physician. Agrees with care and will D/C with follow up. Patient's results have been reviewed with them.   Patient and/or family have verbally conveyed their understanding and agreement of the patient's signs, symptoms, diagnosis, treatment and prognosis and additionally agree to follow up as recommended or return to the Emergency Room should their condition change prior to follow-up. Discharge instructions have also been provided to the patient with some educational information regarding their diagnosis as well a list of reasons why they would want to return to the ER prior to their follow-up appointment should their condition change. Recent Results (from the past 24 hour(s))   URINALYSIS W/MICROSCOPIC    Collection Time: 07/09/17 11:14 PM   Result Value Ref Range    Color YELLOW/STRAW      Appearance CLOUDY (A) CLEAR      Specific gravity 1.015 1.003 - 1.030      pH (UA) 6.0 5.0 - 8.0      Protein NEGATIVE  NEG mg/dL    Glucose NEGATIVE  NEG mg/dL    Ketone NEGATIVE  NEG mg/dL    Bilirubin NEGATIVE  NEG      Blood NEGATIVE  NEG      Urobilinogen 0.2 0.2 - 1.0 EU/dL    Nitrites NEGATIVE  NEG      Leukocyte Esterase NEGATIVE  NEG      WBC 0-4 0 - 4 /hpf    RBC 0-5 0 - 5 /hpf    Epithelial cells FEW FEW /lpf    Bacteria NEGATIVE  NEG /hpf    Hyaline cast 0-2 0 - 5 /lpf   CBC WITH AUTOMATED DIFF    Collection Time: 07/09/17 11:14 PM   Result Value Ref Range    WBC 7.2 3.6 - 11.0 K/uL    RBC 4.73 3.80 - 5.20 M/uL    HGB 13.8 11.5 - 16.0 g/dL    HCT 41.5 35.0 - 47.0 %    MCV 87.7 80.0 - 99.0 FL    MCH 29.2 26.0 - 34.0 PG    MCHC 33.3 30.0 - 36.5 g/dL    RDW 12.7 11.5 - 14.5 %    PLATELET 943 721 - 432 K/uL    NEUTROPHILS 49 32 - 75 %    LYMPHOCYTES 43 12 - 49 %    MONOCYTES 6 5 - 13 %    EOSINOPHILS 2 0 - 7 %    BASOPHILS 0 0 - 1 %    ABS. NEUTROPHILS 3.6 1.8 - 8.0 K/UL    ABS. LYMPHOCYTES 3.1 0.8 - 3.5 K/UL    ABS. MONOCYTES 0.4 0.0 - 1.0 K/UL    ABS. EOSINOPHILS 0.1 0.0 - 0.4 K/UL    ABS.  BASOPHILS 0.0 0.0 - 0.1 K/UL   METABOLIC PANEL, COMPREHENSIVE    Collection Time: 07/09/17 11:14 PM   Result Value Ref Range    Sodium 139 136 - 145 mmol/L    Potassium 3.7 3.5 - 5.1 mmol/L    Chloride 106 97 - 108 mmol/L    CO2 25 21 - 32 mmol/L    Anion gap 8 5 - 15 mmol/L    Glucose 100 65 - 100 mg/dL    BUN 15 6 - 20 MG/DL    Creatinine 0.62 0.55 - 1.02 MG/DL    BUN/Creatinine ratio 24 (H) 12 - 20      GFR est AA >60 >60 ml/min/1.73m2    GFR est non-AA >60 >60 ml/min/1.73m2    Calcium 8.6 8.5 - 10.1 MG/DL    Bilirubin, total 0.2 0.2 - 1.0 MG/DL    ALT (SGPT) 19 12 - 78 U/L    AST (SGOT) 14 (L) 15 - 37 U/L    Alk. phosphatase 102 45 - 117 U/L    Protein, total 7.6 6.4 - 8.2 g/dL    Albumin 4.3 3.5 - 5.0 g/dL    Globulin 3.3 2.0 - 4.0 g/dL    A-G Ratio 1.3 1.1 - 2.2     HCG URINE, QL. - POC    Collection Time: 07/09/17 11:25 PM   Result Value Ref Range    Pregnancy test,urine (POC) NEGATIVE  NEG     WET PREP    Collection Time: 07/10/17 12:36 AM   Result Value Ref Range    Clue cells CLUE CELLS ABSENT      Wet prep NO TRICHOMONAS SEEN         Ct Abd Pelv W Cont    Result Date: 7/10/2017  EXAM:  CT ABD PELV W CONT INDICATION: Lower pelvic pain for one day  COMPARISON: January 31, 2017 TECHNIQUE: Following the uneventful intravenous administration of 100 cc Isovue-370, thin axial images were obtained through the abdomen and pelvis. Coronal and sagittal reconstructions were generated. Oral contrast was not administered. CT dose reduction was achieved through use of a standardized protocol tailored for this examination and automatic exposure control for dose modulation. FINDINGS: LUNG BASES: Clear. INCIDENTALLY IMAGED HEART AND MEDIASTINUM: Unremarkable. LIVER: No mass or biliary dilatation. GALLBLADDER: Unremarkable. SPLEEN: No mass. PANCREAS: No mass or ductal dilatation. ADRENALS: Unremarkable. KIDNEYS: No mass, calculus, or hydronephrosis. STOMACH: Small hiatal hernia. SMALL BOWEL: No dilatation or wall thickening. COLON: No dilatation or wall thickening. APPENDIX: Normal. PERITONEUM: No ascites or pneumoperitoneum. RETROPERITONEUM: No lymphadenopathy or aortic aneurysm. REPRODUCTIVE ORGANS: Retroverted uterus containing an intrauterine device. Normal ovaries. URINARY BLADDER: No mass or calculus.  BONES: No destructive bone lesion. ADDITIONAL COMMENTS: N/A     IMPRESSION: No evidence of acute abdominal or pelvic process. Small hiatal hernia. Intrauterine device within the uterus. Us Transvaginal    Result Date: 7/10/2017  INDICATION: Pelvic pain. Intrauterine device in place. TECHNIQUE: Routine transpelvic ultrasound images were obtained as well as transvaginal images for better definition of the uterus and adnexa. FINDINGS: The transpelvic images demonstrate a distended urinary bladder without evidence of filling defect. The uterus measures 7.6 x 3.4 x 4.4 cm. Myometrial echogenicity is normal. The endometrial stripe measures 5 mm by transpelvic technique. There is an intrauterine device in the endometrial canal. The ovaries are obscured by overlying bowel gas. No pelvic mass or free fluid is seen Transvaginal images demonstrate a normal endometrial stripe measuring 4 mm in thickness. There is an intrauterine device within the endometrial canal. No myometrial mass is seen. The cervix has a normal appearance. The right ovary is obscured by intervening bowel gas. The left ovary measures 2.5 x 2.3 x 1.9 cm; it demonstrates normal vascularity on color Doppler imaging. No adnexal mass or pathological cyst is seen. There is no pelvic free fluid. IMPRESSION: Normal uterus and endometrial stripe. Intrauterine device lies within the endometrial canal. Normal left ovary. Nonvisualization of the right ovary. No evidence of pelvic mass or free fluid. Us Pelv Non Obs    Result Date: 7/10/2017  INDICATION: Pelvic pain. Intrauterine device in place. TECHNIQUE: Routine transpelvic ultrasound images were obtained as well as transvaginal images for better definition of the uterus and adnexa. FINDINGS: The transpelvic images demonstrate a distended urinary bladder without evidence of filling defect. The uterus measures 7.6 x 3.4 x 4.4 cm. Myometrial echogenicity is normal. The endometrial stripe measures 5 mm by transpelvic technique. There is an intrauterine device in the endometrial canal. The ovaries are obscured by overlying bowel gas. No pelvic mass or free fluid is seen Transvaginal images demonstrate a normal endometrial stripe measuring 4 mm in thickness. There is an intrauterine device within the endometrial canal. No myometrial mass is seen. The cervix has a normal appearance. The right ovary is obscured by intervening bowel gas. The left ovary measures 2.5 x 2.3 x 1.9 cm; it demonstrates normal vascularity on color Doppler imaging. No adnexal mass or pathological cyst is seen. There is no pelvic free fluid. IMPRESSION: Normal uterus and endometrial stripe. Intrauterine device lies within the endometrial canal. Normal left ovary. Nonvisualization of the right ovary. No evidence of pelvic mass or free fluid.

## 2017-07-10 NOTE — ED TRIAGE NOTES
Triage: Patient arrives ambulatory from home with c/o lower pelvic and back pain x 1 day. Denies vaginal bleeding/dishcharge, dysuria. Patient reports her OB GYN recently placed an IUD.

## 2017-08-22 ENCOUNTER — OFFICE VISIT (OUTPATIENT)
Dept: FAMILY MEDICINE CLINIC | Age: 27
End: 2017-08-22

## 2017-08-22 VITALS
WEIGHT: 180 LBS | BODY MASS INDEX: 29.99 KG/M2 | HEART RATE: 66 BPM | OXYGEN SATURATION: 100 % | HEIGHT: 65 IN | SYSTOLIC BLOOD PRESSURE: 109 MMHG | DIASTOLIC BLOOD PRESSURE: 75 MMHG | RESPIRATION RATE: 16 BRPM | TEMPERATURE: 98.3 F

## 2017-08-22 DIAGNOSIS — K59.00 CONSTIPATION, UNSPECIFIED CONSTIPATION TYPE: Primary | ICD-10-CM

## 2017-08-22 RX ORDER — POLYETHYLENE GLYCOL 3350 17 G/17G
17 POWDER, FOR SOLUTION ORAL
Qty: 30 EACH | Refills: 0 | Status: SHIPPED | OUTPATIENT
Start: 2017-08-22 | End: 2019-09-27 | Stop reason: SDUPTHER

## 2017-08-22 NOTE — PATIENT INSTRUCTIONS
Estreñimiento: Instrucciones de cuidado - [ Constipation: Care Instructions ]  Instrucciones de cuidado  Tener estreñimiento significa que usted tiene dificultades para eliminar las heces (evacuaciones del intestino). Las personas eliminan heces entre 3 veces al día y [facility] vez cada 3 días. Lo que es normal para usted puede ser [employer]. El estreñimiento puede ocurrir con dolor en el recto y cólicos. El dolor podría empeorar cuando trata de eliminar las heces. A veces hay pequeñas cantidades de chet magaly viva en el papel higiénico o en la superficie de las heces. [place] se debe a las venas dilatadas cerca del recto (hemorroides). Algunos cambios en garcía Ulysses Hopes y estilo de everett podrían ayudarle a evitar el estreñimiento continuo. Es posible que el médico además le recete medicamentos para ayudar a aflojar las heces. Algunos medicamentos pueden causar estreñimiento. [place] incluye los analgésicos (medicamentos para el dolor) y los antidepresivos. Infórmele a garcía médico sobre Steve Mike que usted terrie. Es posible que garcía médico quiera cambiar un medicamento para aliviar trent síntomas. La atención de seguimiento es lazarus parte clave de garcía tratamiento y seguridad. Asegúrese de hacer y acudir a todas las citas, y llame a garcía médico si está teniendo problemas. También es lazarus buena idea saber los resultados de los exámenes y mantener lazarus lista de los medicamentos que terrie. ¿Cómo puede cuidarse en el hogar? · Mary Anne abundantes líquidos, los suficientes trey para que garcía orina sea de color amarillo anish o transparente trey el agua. Si tiene [employer], del corazón o del hígado y tiene que Oakes's líquidos, hable con garcía médico antes de aumentar garcía consumo. · Incluya en garcía dieta diaria alimentos ricos en fibra. Estos incluyen frutas, verduras, frijoles (habichuelas) y granos integrales. · Julián por lo menos 30 minutos de ejercicio la mayoría de los días de la [place]. Caminar es lazarus buena opción. Es posible que también quiera hacer otras actividades, trey correr, nadar, [employer], o jugar al tenis u otros deportes de equipo. · [place] un suplemento de [place], trey Citrucel o Metamucil, todos los GRASSE. Nadine y siga todas las indicaciones de la [place]. · Programe tiempo todos los días para evacuar el intestino. Ayden triplett podría ayudar. Tómese solomon tiempo para evacuar el intestino. · Apoye los pies sobre un banco o taburete pequeño cuando se siente en el inodoro. [place] ayuda a flexionar las caderas y coloca la pelvis en posición de cuclillas. · Solomon médico podría recomendarle un laxante de venta phillip para aliviar el estreñimiento. Staci Gaxiola son Radhika Kepm de Magnesia (Milk of Magnesia) y [place]. Nadine y siga todas las instrucciones de la [place]. No use laxantes de [employer]. ¿Cuándo debe pedir ayuda? Llame a solomon médico ahora mismo o busque atención médica inmediata si:  · Tiene dolor abdominal nuevo o peor. · Tiene náuseas o vómito nuevos o peores. · Tiene chet en las heces. Preste especial atención a los cambios en solomon jean-pierre y asegúrese de comunicarse con solomon médico si:  · Solomon estreñimiento empeora. · No mejora trey se esperaba. ¿Dónde puede encontrar más información en inglés? Fiorella doty http://negrita-loren.info/. Escriba P343 en la búsqueda para aprender Conrado Limb de \"Estreñimiento: Instrucciones de cuidado - [ Constipation: Care Instructions ]. \"  Revisado: 20 marzo, 2017  Versión del contenido: 11.3  © 4525-6234 VISUAL NACERT, Chideo. Las instrucciones de cuidado fueron adaptadas bajo licencia por [employer] (which disclaims liability or warranty for this information). Si usted tiene [place] afección médica o sobre estas instrucciones, siempre pregunte a solomon profesional de jean-pierre. VISUAL NACERT, Chideo niega toda garantía o responsabilidad por solomon uso de esta información.

## 2017-08-22 NOTE — PROGRESS NOTES
[address] [place] [address] with [address] [place] [address]     Chief Complaint: \"I am constipated. \"    Tim Ferguson is an 33 y.o. female with a history of obesity and gestational diabetes mellitus who presents for constipation. The patient is Burundian speaking, [employer]  [id number] used to complete this encounter. The patient reports that for her entire life, she has had difficulty passing her bowels. She notes that she often goes 2-3 weeks between bowel movements. When she does have bowel movements, they are large, painful, and sometimes streaked with blood. She reports that she does get good amounts of fiber in her diet, and does not think that any of her symptoms are diet related at all. She has not tried any medical therapies for this problem. The patient reports that her last BM was yesterday. She is not in any pain today. No fevers, chills, nausea, vomiting. There is no associated diarrhea. Allergies - reviewed:   No Known Allergies    Medications - reviewed:   Current Outpatient Prescriptions   Medication Sig    psyllium seed-sucrose (METAMUCIL, SUGAR,) powd Take 1 teaspoon three times a day with meals.  polyethylene glycol (MIRALAX) 17 gram packet Take 1 Packet by mouth daily as needed.  levonorgestrel (MIRENA) 20 mcg/24 hr (5 years) IUD 1 Each by IntraUTERine route once. No current facility-administered medications for this visit. I have reviewed and updated the histories as listed below:    Past Medical History - reviewed:  Past Medical History:   Diagnosis Date    Cholestasis of pregnancy in third trimester 11/20/2015    Constipation     Gestational diabetes 9/25/2015    Hypertension     possibly per pt report in [place]. Denied it 5/5/15         Past Surgical History - reviewed:   No past surgical history on file.       Social History - reviewed:  Social History     Social History    Marital status: SINGLE Spouse name: N/A    Number of children: N/A    Years of education: N/A     Occupational History    Not on file. Social History Main Topics    Smoking status: Never Smoker    Smokeless tobacco: Never Used    Alcohol use No    Drug use: No    Sexual activity: Yes     Partners: Male     Birth control/ protection: None, Condom     Other Topics Concern    Not on file     Social History Narrative    ** Merged History Encounter **         ** Merged History Encounter **            Family History - reviewed:  Family History   Problem Relation Age of Onset    Diabetes Mother          Immunizations - reviewed:   Immunization History   Administered Date(s) Administered    Influenza Vaccine (Quad) PF 10/02/2015    Influenza Vaccine Split 10/17/2012    Tdap 09/04/2015     Review of Systems  Review of Systems   Constitutional: Negative for activity change and fever. Respiratory: Negative for shortness of breath. Cardiovascular: Negative for chest pain. Gastrointestinal: Positive for constipation. Negative for abdominal pain (none today.), diarrhea, nausea and vomiting. Genitourinary: Negative for dysuria. Physical Exam    Visit Vitals    /75    Pulse 66    Temp 98.3 °F (36.8 °C) (Oral)    Resp 16    Ht 5' 5\" (1.651 m)    Wt 180 lb (81.6 kg)    SpO2 100%    BMI 29.95 kg/m2       Physical Exam   Constitutional: She is oriented to person, place, and time. She appears well-developed and well-nourished. No distress. HENT:   Head: Normocephalic. Neck: Normal range of motion. No thyromegaly present. Cardiovascular: Normal rate, regular rhythm, normal heart sounds and intact distal pulses. Exam reveals no gallop and no friction rub. No murmur heard. Pulmonary/Chest: Effort normal and breath sounds normal. No respiratory distress. She has no wheezes. She has no rales. She exhibits no tenderness. Abdominal: Soft. Bowel sounds are normal. She exhibits no distension and no mass.  There is tenderness (generalized tenderness to deep palpation. ). There is no rebound and no guarding. Lymphadenopathy:     She has no cervical adenopathy. Neurological: She is alert and oriented to person, place, and time. Skin: Skin is warm and dry. She is not diaphoretic. Vitals reviewed. Assessment    ICD-10-CM ICD-9-CM    1. Constipation, unspecified constipation type K59.00 564.00 psyllium seed-sucrose (METAMUCIL, SUGAR,) powd      polyethylene glycol (MIRALAX) 17 gram packet     Plan  1. Constipation, unspecified constipation type - patient has not tried any medical therapies for this problem. I recommended starting metamucil three times daily. I also provided a script for miralax for her to take daily as needed. She is to follow up in a few weeks if she does not have much relief with this therapy. - psyllium seed-sucrose (METAMUCIL, SUGAR,) powd; Take 1 teaspoon three times a day with meals. Dispense: 300 g; Refill: 1  - polyethylene glycol (MIRALAX) 17 gram packet; Take 1 Packet by mouth daily as needed. Dispense: 30 Each; Refill: 0    Follow-up Disposition: Not on File    I have discussed the diagnosis with the patient and the intended plan as seen in the above orders. Patient verbalized understanding of the plan and agrees with the plan. The patient has received an after-visit summary and questions were answered concerning future plans. I have discussed medication side effects and warnings with the patient as well. Informed patient to return to the office if new symptoms arise. Patient was discussed with Dr. Kenya Perla.     Sindhu Ralph MD  Family Medicine Resident

## 2017-08-22 NOTE — MR AVS SNAPSHOT
Visit Information Kaley Sol y Justina Personal Médico Departamento Teléfono del Dep. Número de visita 8/22/2017  3:40 PM Garrick Owens MD [address] [facility] [phone] Upcoming Health Maintenance Date Due  
 HPV AGE 9Y-34Y (1 of 3 - Female 3 Dose Series) 1/15/2002 INFLUENZA AGE 9 TO ADULT 8/1/2017 PAP AKA CERVICAL CYTOLOGY 5/21/2018 DTaP/Tdap/Td series (2 - Td) 9/4/2025 Alergias  Review Complete El: 8/22/2017 Por: Marci Hong LPN A partir del:  8/22/2017 No Known Allergies Vacunas actuales Revisadas el:  9/4/2015 Ivone Freireh Influenza Vaccine (Quad) PF 10/2/2015 Influenza Vaccine Split 10/17/2012 Tdap 9/4/2015 No revisadas esta visita You Were Diagnosed With   
  
 Noreene Mode Constipation, unspecified constipation type    -  Primary ICD-10-CM: K59.00 ICD-9-CM: 564.00 Partes vitales PS Pulso Temperatura Resp [place] ( percentil de crecimiento) Peso (percentil de crecimiento) 109/75 66 98.3 °F (36.8 °C) (Oral) 16 5' 5\" (1.651 m) 180 lb (81.6 kg) LMP (última madi) SpO2 BMI (IMC) Estado obstétrico Estatus de tabaquísmo 08/01/2017 100% 29.95 kg/m2 Unknown Never Smoker BMI and BSA Data Body Mass Index Body Surface Area  
 29.95 kg/m 2 1.93 m 2 [facility]ACY #[id number]- [place], [address] [facility] [phone] Solomon lista de medicamentos actualizada Lista actualizada el: 8/22/17  4:50 PM.  Christina Carter use solomon lista de medicamentos más reciente. DEPO-PROVERA 150 mg/mL Syrg Medicamento genérico:  medroxyPROGESTERone 150 mg by IntraMUSCular route once. HYDROcodone-acetaminophen 5-325 mg per tablet También conocido trey:  Billye [place] Take 1 Tab by mouth every six (6) hours as needed for Pain. Max Daily Amount: 4 Tabs. naproxen 500 mg tablet También conocido trey:  NAPROSYN Take 1 Tab by mouth every twelve (12) hours as needed for Pain.  
  
 polyethylene glycol 17 gram packet También conocido trey:  Ottoniel Barrs Take 1 Packet by mouth daily as needed. promethazine 25 mg tablet También conocido trey: PHENERGAN Take 1 Tab by mouth every six (6) hours as needed. psyllium seed-sucrose Powd También conocido trey:  METAMUCIL (SUGAR) Take 1 teaspoon three times a day with meals. traMADol 50 mg tablet También conocido trey:  ULTRAM  
Take 1 Tab by mouth every six (6) hours as needed for Pain. Max Daily Amount: 200 mg. Recetas Enviado a la William Refills  
 psyllium seed-sucrose (METAMUCIL, SUGAR,) powd 1 Sig: Take 1 teaspoon three times a day with meals. Class: Normal  
 Pharmacy: [facility]/pharmacy #[id number] Ph #: [phone]  
 polyethylene glycol (MIRALAX) 17 gram packet 0 Sig: Take 1 Packet by mouth daily as needed. Class: Normal  
 Pharmacy: [facility]/pharmacy #[id number] Ph #: [phone] Route: Oral  
  
Instrucciones para el Paciente Estreñimiento: Instrucciones de cuidado - [ Constipation: Care Instructions ] Instrucciones de cuidado Tener estreñimiento significa que usted tiene dificultades para eliminar las heces (evacuaciones del intestino). Las personas eliminan heces entre 3 veces al día y Amoret vez cada 3 días. Lo que es normal para usted puede ser [place] [employer]. El estreñimiento puede ocurrir con dolor en el recto y cólicos. El dolor podría empeorar cuando trata de eliminar las heces. A veces hay pequeñas cantidades de chet magaly viva en el papel higiénico o en la superficie de las heces. [place] se debe a las venas dilatadas cerca del recto (hemorroides).  
Algunos cambios en agrcía Lynnette Stover y estilo de everett podrían ayudarle a evitar el estreñimiento continuo. Es posible que el médico además le recete medicamentos para ayudar a aflojar las heces. Algunos medicamentos pueden causar estreñimiento. El [place] incluye los analgésicos (medicamentos para el dolor) y los antidepresivos. Infórmele a garcía médico sobre [employer] Rashid que usted terrie. Es posible que garcía médico quiera cambiar un medicamento para aliviar trent síntomas. La atención de seguimiento es lazarus parte clave de garcía tratamiento y seguridad. Asegúrese de hacer y acudir a todas las citas, y llame a garcía médico si está teniendo problemas. También es lazarus buena idea saber los resultados de los exámenes y mantener lazarus lista de los medicamentos que terrie. Cómo puede cuidarse en el hogar? · Mary Anne abundantes líquidos, los suficientes trey para que garcía orina sea de color amarillo anish o transparente trey el agua. Si tiene [employer], del corazón o del hígado y tiene que Chai's líquidos, hable con garcía médico antes de aumentar garcía consumo. · Incluya en garcía dieta diaria alimentos ricos en fibra. Estos incluyen frutas, verduras, frijoles (habichuelas) y granos integrales. · Julián por lo menos 30 minutos de ejercicio la mayoría de los días de la [place]. Caminar es lazarus buena opción. Es posible que también quiera hacer otras actividades, trey correr, nadar, [employer], o jugar al tenis u otros deportes de equipo. · Leakey un suplemento de Gretchen, trey Citrucel o Metamucil, todos los GRASSE. Nadine y siga todas las indicaciones de la [place]. · Programe tiempo todos los días para evacuar el intestino. [place] rutina diaria podría ayudar. Tómese garcía tiempo para evacuar el intestino. · Apoye los pies sobre un banco o taburete pequeño cuando se siente en el inodoro. El Cenizo ayuda a flexionar las caderas y coloca la pelvis en posición de cuclillas. · García médico podría recomendarle un laxante de venta phillip para aliviar el estreñimiento.  Adelfo Monsivais son [place] Beaulieu de Magnesia (Milk of Magnesia) y MiraLax. Nadine y siga todas las instrucciones de la [place]. No use laxantes de [employer]. Cuándo debe pedir ayuda? Llame a solomon médico ahora mismo o busque atención médica inmediata si: · Tiene dolor abdominal nuevo o peor. · Tiene náuseas o vómito nuevos o peores. · Tiene chet en las heces. Preste especial atención a los cambios en solomon jean-pierre y asegúrese de comunicarse con solomon médico si: 
· Solomon estreñimiento empeora. · No mejora trey se esperaba. Dónde puede encontrar más información en inglés? Tommy Husain a [email or web address] Escriba P343 en la búsqueda para aprender Burnard Rumpf de \"Estreñimiento: Instrucciones de cuidado - [ Constipation: Care Instructions ]. \" 
Revisado: 20 marzo, 2017 Versión del contenido: 11.3 © 7405-2146 Healthwise, Incorporated. Las instrucciones de cuidado fueron adaptadas bajo licencia por [employer] (which disclaims liability or warranty for this information). Si usted tiene Taney Healy afección médica o sobre estas instrucciones, siempre pregunte a solomon profesional de jean-pierre. Healthwise, Incorporated niega toda garantía o responsabilidad por solomon uso de esta información. Introducing [facility]! [facility] introduce portal paciente MyChart . Ahora se puede acceder a partes de solomon expediente médico, enviar por correo electrónico la oficina de solomon médico y solicitar renovaciones de medicamentos en línea. En solomon navegador de Internet , Darion Rodriguez a [email or web address] Julián cristian en el usuario por Dora Leyva? [place] cristian aquí en la sesión Elle Hewitt. Verá la página de registro [place]. Ingrese solomon código de [facility] mary y trey aparece a continuación. Usted no tendrá que [employer] código después de jose f completado el proceso de registro . Si cooper no se inscribe antes de la fecha de caducidad , debe solicitar un nuevo código.  
 
· MyChart Código de acceso : [id number] 
 Expires: 10/8/2017  2:38 AM 
 
Ingresa los últimos cuatro dígitos de garcía Número de Seguro Social ( xxxx ) y fecha de nacimiento ( dd / mm / aaaa ) tery se indica y julián clic en Enviar. Usted será llevado a la siguiente página de registro . Crear un ID MyChart . Esta será garcía ID de inicio de sesión de MyChart y no puede ser [place] , por lo que pensar en lazarus que es Laurance Xiomara y fácil de recordar . Crear lazarus contraseña MyChart . Usted puede cambiar garcía contraseña en cualquier momento . Ingrese garcía Password Reset de preguntas y Mart[place] . [place] se puede utilizar en un momento posterior si usted olvida garcía contraseña. Introduzca garcía dirección de correo electrónico . Ingrid Benedict recibirá lazarus notificación por correo electrónico cuando la nueva información está disponible en MyChart . Ce Late clic en Registrarse. [place] anita y descargar porciones de garcía expediente médico. 
Julián clic en el enlace de descarga del menú Resumen para descargar lazarus copia portátil de garcía información médica . Si tiene January [employer] , por favor visite la sección de preguntas frecuentes del sitio web MyChart . Recuerde, MyChart NO es que se utilizará para las necesidades urgentes. Para emergencias médicas , llame al 911 . Ahora disponible en garcía iPhone y Android ! Por favor proporcione scott resumen de la documentación de cuidado a garcía próximo proveedor. Your primary care clinician is listed as Josee Mcclellan. If you have any questions after today's visit, please call [phone].

## 2017-09-19 ENCOUNTER — HOSPITAL ENCOUNTER (EMERGENCY)
Age: 27
Discharge: HOME | End: 2017-09-19
Attending: EMERGENCY MEDICINE
Payer: SELF-PAY

## 2017-09-19 ENCOUNTER — APPOINTMENT (OUTPATIENT)
Dept: CT IMAGING | Age: 27
End: 2017-09-19
Attending: EMERGENCY MEDICINE
Payer: SELF-PAY

## 2017-09-19 VITALS
WEIGHT: 181 LBS | HEART RATE: 80 BPM | OXYGEN SATURATION: 100 % | HEIGHT: 65 IN | DIASTOLIC BLOOD PRESSURE: 78 MMHG | RESPIRATION RATE: 18 BRPM | TEMPERATURE: 98.2 F | SYSTOLIC BLOOD PRESSURE: 128 MMHG | BODY MASS INDEX: 30.16 KG/M2

## 2017-09-19 DIAGNOSIS — R10.32 ABDOMINAL PAIN, LLQ (LEFT LOWER QUADRANT): Primary | ICD-10-CM

## 2017-09-19 LAB
ALBUMIN SERPL-MCNC: 3.8 G/DL (ref 3.5–5)
ALBUMIN/GLOB SERPL: 0.9 {RATIO} (ref 1.1–2.2)
ALP SERPL-CCNC: 87 U/L (ref 45–117)
ALT SERPL-CCNC: 21 U/L (ref 12–78)
ANION GAP SERPL CALC-SCNC: 10 MMOL/L (ref 5–15)
APPEARANCE UR: CLEAR
AST SERPL-CCNC: 12 U/L (ref 15–37)
BACTERIA URNS QL MICRO: NEGATIVE /HPF
BASOPHILS # BLD: 0 K/UL (ref 0–0.1)
BASOPHILS NFR BLD: 0 % (ref 0–1)
BILIRUB SERPL-MCNC: 0.3 MG/DL (ref 0.2–1)
BILIRUB UR QL: NEGATIVE
BUN SERPL-MCNC: 12 MG/DL (ref 6–20)
BUN/CREAT SERPL: 20 (ref 12–20)
CALCIUM SERPL-MCNC: 8.9 MG/DL (ref 8.5–10.1)
CHLORIDE SERPL-SCNC: 104 MMOL/L (ref 97–108)
CO2 SERPL-SCNC: 23 MMOL/L (ref 21–32)
COLOR UR: ABNORMAL
CREAT SERPL-MCNC: 0.61 MG/DL (ref 0.55–1.02)
EOSINOPHIL # BLD: 0.1 K/UL (ref 0–0.4)
EOSINOPHIL NFR BLD: 1 % (ref 0–7)
EPITH CASTS URNS QL MICRO: ABNORMAL /LPF
ERYTHROCYTE [DISTWIDTH] IN BLOOD BY AUTOMATED COUNT: 13.2 % (ref 11.5–14.5)
GLOBULIN SER CALC-MCNC: 4.1 G/DL (ref 2–4)
GLUCOSE SERPL-MCNC: 95 MG/DL (ref 65–100)
GLUCOSE UR STRIP.AUTO-MCNC: NEGATIVE MG/DL
HCG UR QL: NEGATIVE
HCT VFR BLD AUTO: 42.1 % (ref 35–47)
HGB BLD-MCNC: 13.9 G/DL (ref 11.5–16)
HGB UR QL STRIP: ABNORMAL
HYALINE CASTS URNS QL MICRO: ABNORMAL /LPF (ref 0–5)
KETONES UR QL STRIP.AUTO: NEGATIVE MG/DL
LEUKOCYTE ESTERASE UR QL STRIP.AUTO: NEGATIVE
LIPASE SERPL-CCNC: 132 U/L (ref 73–393)
LYMPHOCYTES # BLD: 1.5 K/UL (ref 0.8–3.5)
LYMPHOCYTES NFR BLD: 19 % (ref 12–49)
MCH RBC QN AUTO: 28.6 PG (ref 26–34)
MCHC RBC AUTO-ENTMCNC: 33 G/DL (ref 30–36.5)
MCV RBC AUTO: 86.6 FL (ref 80–99)
MONOCYTES # BLD: 0.4 K/UL (ref 0–1)
MONOCYTES NFR BLD: 5 % (ref 5–13)
NEUTS SEG # BLD: 5.9 K/UL (ref 1.8–8)
NEUTS SEG NFR BLD: 75 % (ref 32–75)
NITRITE UR QL STRIP.AUTO: NEGATIVE
PH UR STRIP: 6.5 [PH] (ref 5–8)
PLATELET # BLD AUTO: 223 K/UL (ref 150–400)
POTASSIUM SERPL-SCNC: 3.8 MMOL/L (ref 3.5–5.1)
PROT SERPL-MCNC: 7.9 G/DL (ref 6.4–8.2)
PROT UR STRIP-MCNC: NEGATIVE MG/DL
RBC # BLD AUTO: 4.86 M/UL (ref 3.8–5.2)
RBC #/AREA URNS HPF: ABNORMAL /HPF (ref 0–5)
SODIUM SERPL-SCNC: 137 MMOL/L (ref 136–145)
SP GR UR REFRACTOMETRY: 1.02 (ref 1–1.03)
UR CULT HOLD, URHOLD: NORMAL
UROBILINOGEN UR QL STRIP.AUTO: 0.2 EU/DL (ref 0.2–1)
WBC # BLD AUTO: 7.8 K/UL (ref 3.6–11)
WBC URNS QL MICRO: ABNORMAL /HPF (ref 0–4)

## 2017-09-19 PROCEDURE — 36415 COLL VENOUS BLD VENIPUNCTURE: CPT | Performed by: EMERGENCY MEDICINE

## 2017-09-19 PROCEDURE — 85025 COMPLETE CBC W/AUTO DIFF WBC: CPT | Performed by: EMERGENCY MEDICINE

## 2017-09-19 PROCEDURE — 80053 COMPREHEN METABOLIC PANEL: CPT | Performed by: EMERGENCY MEDICINE

## 2017-09-19 PROCEDURE — 74011250636 HC RX REV CODE- 250/636: Performed by: EMERGENCY MEDICINE

## 2017-09-19 PROCEDURE — 81025 URINE PREGNANCY TEST: CPT

## 2017-09-19 PROCEDURE — 96361 HYDRATE IV INFUSION ADD-ON: CPT

## 2017-09-19 PROCEDURE — 74177 CT ABD & PELVIS W/CONTRAST: CPT

## 2017-09-19 PROCEDURE — 81001 URINALYSIS AUTO W/SCOPE: CPT | Performed by: EMERGENCY MEDICINE

## 2017-09-19 PROCEDURE — 74011636320 HC RX REV CODE- 636/320: Performed by: EMERGENCY MEDICINE

## 2017-09-19 PROCEDURE — 83690 ASSAY OF LIPASE: CPT | Performed by: EMERGENCY MEDICINE

## 2017-09-19 PROCEDURE — 96374 THER/PROPH/DIAG INJ IV PUSH: CPT

## 2017-09-19 PROCEDURE — 99283 EMERGENCY DEPT VISIT LOW MDM: CPT

## 2017-09-19 PROCEDURE — 74011000258 HC RX REV CODE- 258: Performed by: EMERGENCY MEDICINE

## 2017-09-19 PROCEDURE — 96375 TX/PRO/DX INJ NEW DRUG ADDON: CPT

## 2017-09-19 RX ORDER — ONDANSETRON 2 MG/ML
4 INJECTION INTRAMUSCULAR; INTRAVENOUS
Status: COMPLETED | OUTPATIENT
Start: 2017-09-19 | End: 2017-09-19

## 2017-09-19 RX ORDER — SODIUM CHLORIDE 0.9 % (FLUSH) 0.9 %
10 SYRINGE (ML) INJECTION
Status: COMPLETED | OUTPATIENT
Start: 2017-09-19 | End: 2017-09-19

## 2017-09-19 RX ORDER — FENTANYL CITRATE 50 UG/ML
50 INJECTION, SOLUTION INTRAMUSCULAR; INTRAVENOUS
Status: COMPLETED | OUTPATIENT
Start: 2017-09-19 | End: 2017-09-19

## 2017-09-19 RX ORDER — KETOROLAC TROMETHAMINE 30 MG/ML
15 INJECTION, SOLUTION INTRAMUSCULAR; INTRAVENOUS
Status: COMPLETED | OUTPATIENT
Start: 2017-09-19 | End: 2017-09-19

## 2017-09-19 RX ADMIN — IOPAMIDOL 100 ML: 755 INJECTION, SOLUTION INTRAVENOUS at 17:02

## 2017-09-19 RX ADMIN — KETOROLAC TROMETHAMINE 15 MG: 30 INJECTION, SOLUTION INTRAMUSCULAR at 16:06

## 2017-09-19 RX ADMIN — ONDANSETRON 4 MG: 2 INJECTION INTRAMUSCULAR; INTRAVENOUS at 16:06

## 2017-09-19 RX ADMIN — SODIUM CHLORIDE 1000 ML: 900 INJECTION, SOLUTION INTRAVENOUS at 15:22

## 2017-09-19 RX ADMIN — Medication 10 ML: at 17:02

## 2017-09-19 RX ADMIN — FENTANYL CITRATE 50 MCG: 50 INJECTION, SOLUTION INTRAMUSCULAR; INTRAVENOUS at 16:06

## 2017-09-19 RX ADMIN — SODIUM CHLORIDE 100 ML: 900 INJECTION, SOLUTION INTRAVENOUS at 17:02

## 2017-09-19 NOTE — DISCHARGE INSTRUCTIONS
Dolor abdominal: Instrucciones de cuidado - [ Abdominal Pain: Care Instructions ]  Instrucciones de cuidado    El dolor abdominal tiene muchas causas posibles. Algunas de ellas no son graves y mejoran por sí solas en unos días. Otras requieren [place] y [place]. Si garcía dolor continúa o [place], necesitará lazarus nueva revisión y [place] pruebas para determinar qué pasa. Es posible que necesite cirugía para corregir el problema. No ignore nuevos síntomas, trey fiebre, náuseas y [place], [facility], dolor que [place] o [place]. Podrían ser señales de un problema más grave. García médico puede haberle recomendado lazarus consulta de [employer] las 8 o 12 horas siguientes. Si no se siente mejor, es posible que requiera [place] o [place]. El médico lo fisher revisado minuciosamente, cristina puede jose f problemas más tarde. Si nota algún problema o síntomas nuevos, busque tratamiento médico inmediatamente. La atención de seguimiento es lazarus parte clave de garcía tratamiento y seguridad. Asegúrese de hacer y acudir a todas las citas, y llame a garcía médico si está teniendo problemas. También es lazarus buena idea saber los resultados de los exámenes y mantener lazarus lista de los medicamentos que terrie. ¿Cómo puede cuidarse en el hogar? · Descanse hasta que se sienta mejor. · Para prevenir la deshidratación, bing abundantes líquidos, suficientes para que garcía orina sea de color amarillo anish o transparente trey el agua. Elija beber agua y otros líquidos ely sin cafeína hasta que se sienta mejor. Si tiene [email or web address], del corazón o del hígado y tiene que Chai's líquidos, hable con garcía médico antes de aumentar garcía consumo. · Si tiene [employer], coma alimentos suaves, trey arroz, pan jolynn seco o galletas saladas, bananas (plátanos) y puré de Synchari. Trate de comer varias comidas pequeñas al día en lugar de dos o kelly grandes.   · Espere hasta 50 horas después de que [employer] síntomas hayan desaparecido antes de comer alimentos condimentados, alcohol y bebidas que contengan cafeína. · No consuma alimentos ricos en grasa. · Evite medicamentos antiinflamatorios trey aspirina, ibuprofeno (Advil, Motrin) y naproxeno (Aleve). Pueden causar [employer]. Dígale a garcía médico si está tomando aspirina diariamente debido a otro problema de jean-pierre. ¿Cuándo debe pedir ayuda? Llame al 911 en cualquier momento que considere que necesita atención de emergencia. Por ejemplo, llame si:  · Se desmayó (perdió el conocimiento). · Las heces son de color rojizo o muy sanguinolentas (con chet). · Vomita chet o algo parecido a granos de café molido. · Tiene dolor abdominal nuevo e intenso. Llame a garcía médico ahora mismo o busque atención médica inmediata si:  · García dolor empeora, sobre todo si se concentra en lazarus denise parte del vientre. · Vuelve a tener fiebre o tiene fiebre más radha. · Noris heces son negruzcas y parecidas al alquitrán o tienen rastros de [place]. · Tiene sangrado vaginal inesperado. · Tiene síntomas de lazarus infección del tracto urinario. Estos podrían incluir:  ¨ Dolor al [place]. ¨ Orinar con más frecuencia que lo habitual.  ¨ Chet en la [facility]. · Siente mareos o aturdimiento, o que está a punto de [place]. Preste especial atención a los cambios en garcía jean-pierre y asegúrese de comunicarse con garcía médico si:  · No está mejorando después de 1 día (24 horas). ¿Dónde puede encontrar más información en inglés? Minal Lipoma a http://negrita-loren.info/. Tara Servant P162 en la búsqueda para aprender más acerca de \"Dolor abdominal: Instrucciones de cuidado - [ Abdominal Pain: Care Instructions ]. \"  Revisado: 20 marzo, 2017  Versión del contenido: 11.3  © 1783-9575 [employer], [place]. Las instrucciones de cuidado fueron adaptadas bajo licencia por [employer] (which disclaims liability or warranty for this information).  Si usted tiene [place] afección médica o sobre estas instrucciones, siempre pregunte a garcía profesional de jean-pierre. [facility] niega toda garantía o responsabilidad por garcía uso de esta información. Dispositivo intrauterino (DIU) trey método anticonceptivo: Instrucciones de cuidado - [ Intrauterine Device (IUD) for Birth Control: Care Instructions ]  Instrucciones de [address] dispositivo intrauterino (DIU) se [place] para prevenir el embarazo. Es un dispositivo pequeño, de plástico y en forma de T. García médico le coloca el DIU en el [place]. Usted está utilizando un DIU hormonal o un DIU de cobre. · Los DIU hormonales previenen el embarazo por un período de 3 a 5 años, según el DIU que se use. Lazarus vez que lo tiene, no tiene que hacer nada más para prevenir Nic Maul. · El DIU de cobre previene el embarazo lee [address] Deborrah Ditto que lo tiene, no tiene que hacer nada para prevenir el embarazo. Un cordón adherido al extremo del DIU cuelga a través de la abertura del útero (llamada prasad uterino) dentro de la vagina. Puede revisar que el DIU está en garcía lugar sintiendo el cordón. El DIU generalmente se queda en el útero hasta que garcía médico lo retira. La atención de seguimiento es lazarus parte clave de garcía tratamiento y seguridad. Asegúrese de hacer y acudir a todas las citas, y llame a garcía médico si está teniendo problemas. También es lazarus buena idea saber los resultados de los exámenes y mantener lazarus lista de los medicamentos que terrie. ¿Cómo puede cuidarse en el hogar? ¿Cómo se [place] el DIU?  · García médico le inserta el DIU. [place] terrie solo unos minutos y se puede hacer en el consultorio de garcía médico.  · García médico puede hacer que palpe el cordón del DIU después de la inserción para asegurarse de que sepa cómo se siente el cordón. · Verifique el cordón después de cada período.   ¨ Introduzca un dedo en la vagina y sienta el prasad uterino, que está en la parte superior de la vagina y se siente más tad que el therese de la vagina (algunas mujeres dicen que se siente trey la punta de la Dmitri). ¨ Debería sentir el cordón monique de plástico saliendo de la abertura del prasad uterino. Si no puede sentir el cordón, no siempre significa que el DIU está fuera de lugar. Algunas veces es difícil sentir el cordón o se ha metido hacia adentro en el canal cervical (lo que no la afectará). · Es posible que garcía médico quiera verla de 4 a 6 semanas después de la inserción del DIU, para asegurarse de que esté en garcía lugar. ¿Qué hacer si piensa que el DIU no está en garcía lugar? Siempre mai la etiqueta para obtener instrucciones específicas. He aquí algunas pautas básicas:  · Llame a garcía médico y use un método anticonceptivo de respaldo, trey un condón, o no tenga relaciones sexuales hasta que sepa que el DIU está funcionando. · Si ha tenido [employer], puede utilizar la anticoncepción de [place], trey la píldora del \"día después\" (Plan B). Puede utilizar la anticoncepción de urgencia hasta 5 días después de jose f tenido relaciones sexuales, cristina funciona mejor si la terrie inmediatamente. ¿Qué más necesita saber? · El DIU tiene efectos secundarios. ¨ El DIU hormonal por lo general reduce el flujo menstrual y los cólicos con el paso del [place]. También puede causar [place], cambios repentinos del Simon de ánimo y sensibilidad en los senos. ¨ El DIU de cobre puede causar períodos más largos y [place] abundantes. · Después de que un DIU es colocado por primera vez, usted puede experimentar algunos cólicos leves y [place] ligero lee 1 o 2 días. · El DIU no protege contra las infecciones de transmisión sexual (STI, por trent siglas en inglés), trey el herpes o el VIH/SIDA. Si no está farias de si garcía liudmila sexual pudiera tener lazarus STI, utilice un condón para protegerse de [place]. ¿Cuándo debe pedir ayuda? Llame a garcía médico ahora mismo o busque atención médica inmediata si:  · Siente dolor intenso en el abdomen o en la pelvis.   · Tiene sangrado vaginal intenso. [place] significa que empapa las toallas sanitarias o los tampones que suele usar cada hora, lee 2 o más horas. · Tiene flujo vaginal con un olor desagradable. · Kwabena Speaks y [address]. · Piensa que podría estar embarazada. Preste especial atención a los cambios en garcía jean-pierre y asegúrese de comunicarse con garcía médico si:  · No puede encontrar el cordón de garcía DIU, o el cordón es más corto o más matrir de lo normal.  · Tiene problemas con garcía método anticonceptivo. · Josie que puede jose f [place] expuesta a, o tiene, lazarus infección de transmisión sexual.  ¿Dónde puede encontrar más información en inglés? Claudia Olson a http://negrita-loren.info/. Escriba F215 en la búsqueda para aprender más acerca de \"Dispositivo intrauterino (DIU) trey método anticonceptivo: Instrucciones de cuidado - [ Intrauterine Device (IUD) for Birth Control: Care Instructions ]. \"  Revisado: 16 marzo, 2017  Versión del contenido: 11.3  © 5726-3130 [employer], Incorporated. Las instrucciones de cuidado fueron adaptadas bajo licencia por [employer] (which disclaims liability or warranty for this information). Si usted tiene [place] afección médica o sobre estas instrucciones, siempre pregunte a garcía profesional de jean-pierre. [employer]wise, Incorporated niega toda garantía o responsabilidad por garcía uso de esta información. We hope that we have addressed all of your medical concerns. The examination and treatment you received in the Emergency Department were for an emergent problem and were not intended as complete care. It is important that you follow up with your healthcare provider(s) for ongoing care. If your symptoms worsen or do not improve as expected, and you are unable to reach your usual health care provider(s), you should return to the Emergency Department.       Today's healthcare is undergoing tremendous change, and patient satisfaction surveys are one of the many tools to assess the quality of medical care. You may receive a survey from the [employer] regarding your experience in the Emergency Department. I hope that your experience has been completely positive, particularly the medical care that I provided. As such, please participate in the survey; anything less than excellent does not meet my expectations or intentions. [facility] and [facility] participate in nationally recognized quality of care measures. If your blood pressure is greater than 120/80, as reported below, we urge that you seek medical care to address the potential of high blood pressure, commonly known as hypertension. Hypertension can be hereditary or can be caused by certain medical conditions, pain, stress, or \"white coat syndrome. \"       Please make an appointment with your health care provider(s) for follow up of your Emergency Department visit. VITALS:   Patient Vitals for the past 8 hrs:   Temp Pulse Resp BP SpO2   09/19/17 1440 98.5 °F (36.9 °C) 81 16 138/83 99 %          Thank you for allowing us to provide you with medical care today. We realize that you have many choices for your emergency care needs. Please choose us in the future for any continued health care needs. Alonzo Rodríguez 79, [address] [facility] 20.   Office: [phone]            Recent Results (from the past 24 hour(s))   URINALYSIS W/MICROSCOPIC    Collection Time: 09/19/17  3:19 PM   Result Value Ref Range    Color YELLOW/STRAW      Appearance CLEAR CLEAR      Specific gravity 1.025 1.003 - 1.030      pH (UA) 6.5 5.0 - 8.0      Protein NEGATIVE  NEG mg/dL    Glucose NEGATIVE  NEG mg/dL    Ketone NEGATIVE  NEG mg/dL    Bilirubin NEGATIVE  NEG      Blood SMALL (A) NEG      Urobilinogen 0.2 0.2 - 1.0 EU/dL    Nitrites NEGATIVE  NEG      Leukocyte Esterase NEGATIVE  NEG      WBC 0-4 0 - 4 /hpf    RBC 10-20 0 - 5 /hpf Epithelial cells FEW FEW /lpf    Bacteria NEGATIVE  NEG /hpf    Hyaline cast 0-2 0 - 5 /lpf   URINE CULTURE HOLD SAMPLE    Collection Time: 09/19/17  3:19 PM   Result Value Ref Range    Urine culture hold URINE ON HOLD IN MICROBIOLOGY DEPT FOR 3 DAYS     METABOLIC PANEL, COMPREHENSIVE    Collection Time: 09/19/17  3:19 PM   Result Value Ref Range    Sodium 137 136 - 145 mmol/L    Potassium 3.8 3.5 - 5.1 mmol/L    Chloride 104 97 - 108 mmol/L    CO2 23 21 - 32 mmol/L    Anion gap 10 5 - 15 mmol/L    Glucose 95 65 - 100 mg/dL    BUN 12 6 - 20 MG/DL    Creatinine 0.61 0.55 - 1.02 MG/DL    BUN/Creatinine ratio 20 12 - 20      GFR est AA >60 >60 ml/min/1.73m2    GFR est non-AA >60 >60 ml/min/1.73m2    Calcium 8.9 8.5 - 10.1 MG/DL    Bilirubin, total 0.3 0.2 - 1.0 MG/DL    ALT (SGPT) 21 12 - 78 U/L    AST (SGOT) 12 (L) 15 - 37 U/L    Alk. phosphatase 87 45 - 117 U/L    Protein, total 7.9 6.4 - 8.2 g/dL    Albumin 3.8 3.5 - 5.0 g/dL    Globulin 4.1 (H) 2.0 - 4.0 g/dL    A-G Ratio 0.9 (L) 1.1 - 2.2     CBC WITH AUTOMATED DIFF    Collection Time: 09/19/17  3:19 PM   Result Value Ref Range    WBC 7.8 3.6 - 11.0 K/uL    RBC 4.86 3.80 - 5.20 M/uL    HGB 13.9 11.5 - 16.0 g/dL    HCT 42.1 35.0 - 47.0 %    MCV 86.6 80.0 - 99.0 FL    MCH 28.6 26.0 - 34.0 PG    MCHC 33.0 30.0 - 36.5 g/dL    RDW 13.2 11.5 - 14.5 %    PLATELET 382 043 - 360 K/uL    NEUTROPHILS 75 32 - 75 %    LYMPHOCYTES 19 12 - 49 %    MONOCYTES 5 5 - 13 %    EOSINOPHILS 1 0 - 7 %    BASOPHILS 0 0 - 1 %    ABS. NEUTROPHILS 5.9 1.8 - 8.0 K/UL    ABS. LYMPHOCYTES 1.5 0.8 - 3.5 K/UL    ABS. MONOCYTES 0.4 0.0 - 1.0 K/UL    ABS. EOSINOPHILS 0.1 0.0 - 0.4 K/UL    ABS.  BASOPHILS 0.0 0.0 - 0.1 K/UL   LIPASE    Collection Time: 09/19/17  3:19 PM   Result Value Ref Range    Lipase 132 73 - 393 U/L   HCG URINE, QL. - POC    Collection Time: 09/19/17  3:20 PM   Result Value Ref Range    Pregnancy test,urine (POC) NEGATIVE  NEG         Ct Abd Pelv W Cont    Result Date: 9/19/2017  EXAM:  CT ABD PELV W CONT INDICATION: LLQ abd pain/ COMPARISON: July 2017 CONTRAST: mL of Isovue-370. TECHNIQUE: Following the uneventful intravenous administration of contrast, thin axial images were obtained through the abdomen and pelvis. Coronal and sagittal reconstructions were generated. Oral contrast was not administered. CT dose reduction was achieved through use of a standardized protocol tailored for this examination and automatic exposure control for dose modulation. FINDINGS: LUNG BASES: Clear. Small hiatal hernia. INCIDENTALLY IMAGED HEART AND MEDIASTINUM: Unremarkable. LIVER: No mass or biliary dilatation. GALLBLADDER: Unremarkable. SPLEEN: No mass. PANCREAS: No mass or ductal dilatation. ADRENALS: Unremarkable. KIDNEYS: No mass, calculus, or hydronephrosis. STOMACH: Unremarkable. SMALL BOWEL: No dilatation or wall thickening. COLON: No dilatation or wall thickening. APPENDIX: Unremarkable. PERITONEUM: No ascites or pneumoperitoneum. RETROPERITONEUM: No lymphadenopathy or aortic aneurysm. REPRODUCTIVE ORGANS: Normal. Intrauterine device in place. URINARY BLADDER: No mass or calculus. BONES: No destructive bone lesion. ADDITIONAL COMMENTS: Small umbilical hernia containing only adipose. IMPRESSION: No acute findings. Small hiatal hernia. Small umbilical hernia.

## 2017-09-19 NOTE — ED TRIAGE NOTES
Left lower abd pain x one month, pt stated to vomit yesterday, denies fever, denies diarrhea or constipation, +dysuria

## 2017-09-19 NOTE — ED PROVIDER NOTES
HPI Comments: 33 y.o. female with past medical history significant for HTN who presents ambulatory from home accompanied by her  with chief complaint of abdominal pain. Pt reports intermittent left-sided abdominal pain for 2 months since having an IUD placed. Pt describes pain as \"pressure\". Pt states pain is worse when she is walking. Pt denies outward swelling. Pt also reports vague neck pain, headache, leg cramps and dizziness that prevent her from sleeping. Pt states she sought f/u with the [facility] where she had the IUD placed 2 weeks ago and she was told everything was normal. Pt is requesting removal of the IUD today. Pt states her last bowel movement was normal this morning. Pt specifically denies diarrhea, change in appetite, and difficulty swallowing. There are no other acute medical concerns at this time. Social hx: denies tobacco use; denies EtOH use; denies illicit drug use  PCP: Galdino Bhat MD    Note written by Satnam Saleh, as dictated by Marisol Dye MD 3:35 PM        The history is provided by the patient. The history is limited by a language barrier. A  was used (Alba I). Past Medical History:   Diagnosis Date    Cholestasis of pregnancy in third trimester 11/20/2015    Constipation     Gestational diabetes 9/25/2015    Hypertension     possibly per pt report in [place]. Denied it 5/5/15       History reviewed. No pertinent surgical history. Family History:   Problem Relation Age of Onset    Diabetes Mother        Social History     Social History    Marital status: SINGLE     Spouse name: N/A    Number of children: N/A    Years of education: N/A     Occupational History    Not on file.      Social History Main Topics    Smoking status: Never Smoker    Smokeless tobacco: Never Used    Alcohol use No    Drug use: No    Sexual activity: Yes     Partners: Male     Birth control/ protection: None, Condom     Other Topics Concern    Not on file     Social History Narrative    ** Merged History Encounter **         ** Merged History Encounter **            ALLERGIES: Review of patient's allergies indicates no known allergies. Review of Systems   Constitutional: Negative for appetite change. HENT: Negative for trouble swallowing. Gastrointestinal: Positive for abdominal pain, nausea and vomiting. Negative for diarrhea. All other systems reviewed and are negative. Vitals:    09/19/17 1440   BP: 138/83   Pulse: 81   Resp: 16   Temp: 98.5 °F (36.9 °C)   SpO2: 99%   Weight: 82.1 kg (181 lb)   Height: 5' 5\" (1.651 m)            Physical Exam   Constitutional: She is oriented to person, place, and time. She appears well-developed and well-nourished. NAD, AxOx4, speaking in complete sentences     Eyes: Conjunctivae are normal. Pupils are equal, round, and reactive to light. Right eye exhibits no discharge. No scleral icterus. Neck: Normal range of motion. Neck supple. No JVD present. No tracheal deviation present. Cardiovascular: Normal rate, regular rhythm, normal heart sounds and intact distal pulses. Exam reveals no gallop and no friction rub. No murmur heard. Pulmonary/Chest: Effort normal and breath sounds normal. No respiratory distress. She has no wheezes. She has no rales. She exhibits no tenderness. Abdominal: Soft. Bowel sounds are normal. There is no tenderness. There is no rebound and no guarding. Genitourinary: No vaginal discharge found. Genitourinary Comments: Pt denies urinary/ vaginal complaints   Musculoskeletal: Normal range of motion. She exhibits no edema or tenderness. Neurological: She is alert and oriented to person, place, and time. She has normal reflexes. No cranial nerve deficit. She exhibits normal muscle tone. Coordination normal.   Skin: Skin is warm and dry. No rash noted. No erythema. No pallor. Psychiatric: She has a normal mood and affect.  Her behavior is normal. Thought content normal.   Nursing note and vitals reviewed. MDM  ED Course       Procedures    Chief Complaint   Patient presents with    Nausea       3:17 PM  The patients presenting problems have been discussed, and they are in agreement with the care plan formulated and outlined with them. I have encouraged them to ask questions as they arise throughout their visit. MEDICATIONS GIVEN:  Medications   sodium chloride 0.9 % bolus infusion 1,000 mL (not administered)       LABS REVIEWED:  Labs Reviewed   URINE CULTURE HOLD SAMPLE   URINALYSIS W/MICROSCOPIC   METABOLIC PANEL, COMPREHENSIVE   CBC WITH AUTOMATED DIFF   LIPASE   SAMPLES BEING HELD   POC URINE PREGNANCY TEST       RADIOLOGY RESULTS:  The following have been ordered and reviewed:  _____________________________________________________________________  _____________________________________________________________________      PROCEDURES:        CONSULTATIONS:       PROGRESS NOTES:      DIAGNOSIS:    1. Abdominal pain, LLQ (left lower quadrant)        PLAN:  1- abd pain LLQ/ neg ed evaluation;       ED COURSE: The patients hospital course has been uncomplicated. CT abd/pelv: Impression: No acute findings. Small hiatal hernia. Small umbilical hernia.

## 2017-12-06 ENCOUNTER — APPOINTMENT (OUTPATIENT)
Dept: GENERAL RADIOLOGY | Age: 27
End: 2017-12-06
Attending: PHYSICIAN ASSISTANT
Payer: SELF-PAY

## 2017-12-06 ENCOUNTER — HOSPITAL ENCOUNTER (EMERGENCY)
Age: 27
Discharge: HOME | End: 2017-12-06
Attending: EMERGENCY MEDICINE
Payer: SELF-PAY

## 2017-12-06 VITALS
OXYGEN SATURATION: 98 % | BODY MASS INDEX: 35.98 KG/M2 | WEIGHT: 195.5 LBS | HEIGHT: 62 IN | SYSTOLIC BLOOD PRESSURE: 124 MMHG | DIASTOLIC BLOOD PRESSURE: 81 MMHG | RESPIRATION RATE: 16 BRPM | HEART RATE: 74 BPM | TEMPERATURE: 98.6 F

## 2017-12-06 DIAGNOSIS — R51.9 NONINTRACTABLE HEADACHE, UNSPECIFIED CHRONICITY PATTERN, UNSPECIFIED HEADACHE TYPE: Primary | ICD-10-CM

## 2017-12-06 LAB — HCG UR QL: NEGATIVE

## 2017-12-06 PROCEDURE — 74011250636 HC RX REV CODE- 250/636: Performed by: PHYSICIAN ASSISTANT

## 2017-12-06 PROCEDURE — 96375 TX/PRO/DX INJ NEW DRUG ADDON: CPT

## 2017-12-06 PROCEDURE — 71020 XR CHEST PA LAT: CPT

## 2017-12-06 PROCEDURE — 99284 EMERGENCY DEPT VISIT MOD MDM: CPT

## 2017-12-06 PROCEDURE — 96374 THER/PROPH/DIAG INJ IV PUSH: CPT

## 2017-12-06 PROCEDURE — 81025 URINE PREGNANCY TEST: CPT

## 2017-12-06 RX ORDER — DIPHENHYDRAMINE HYDROCHLORIDE 50 MG/ML
25 INJECTION, SOLUTION INTRAMUSCULAR; INTRAVENOUS
Status: COMPLETED | OUTPATIENT
Start: 2017-12-06 | End: 2017-12-06

## 2017-12-06 RX ORDER — METOCLOPRAMIDE HYDROCHLORIDE 5 MG/ML
10 INJECTION INTRAMUSCULAR; INTRAVENOUS
Status: COMPLETED | OUTPATIENT
Start: 2017-12-06 | End: 2017-12-06

## 2017-12-06 RX ADMIN — SODIUM CHLORIDE 1000 ML: 900 INJECTION, SOLUTION INTRAVENOUS at 15:30

## 2017-12-06 RX ADMIN — METOCLOPRAMIDE 10 MG: 5 INJECTION, SOLUTION INTRAMUSCULAR; INTRAVENOUS at 15:31

## 2017-12-06 RX ADMIN — DIPHENHYDRAMINE HYDROCHLORIDE 25 MG: 50 INJECTION, SOLUTION INTRAMUSCULAR; INTRAVENOUS at 15:31

## 2017-12-06 NOTE — DISCHARGE INSTRUCTIONS
Dolor de gilberto: Instrucciones de cuidado - [ Headache: Care Instructions ]  Instrucciones de cuidado    Los roscoe de gilberto tienen muchas causas posibles. La mayoría de los roscoe de gilberto no son señal de un problema más padmini y mejoran por sí solos. El tratamiento en el hogar podría ayudarlo a sentirse mejor con Aidane Sorrow. El médico lo fisher revisado minuciosamente, cristina puede desarrollar problemas más tarde. Si nota algún problema o síntomas, busque tratamiento médico inmediatamente. La atención de seguimiento es lazarus parte clave de garcía tratamiento y seguridad. Asegúrese de hacer y acudir a todas las citas, y llame a garcía médico si está teniendo problemas. También es lazarus buena idea saber los resultados de los exámenes y mantener lazarus lista de los medicamentos que terrie. ¿Cómo puede cuidarse en el hogar? · No conduzca si ha tomado analgésicos (medicamentos para el dolor) recetados. · Descanse en un cuarto tranquilo y oscuro hasta que desaparezca el dolor de [place]. Cierre los ojos y trate de relajarse o dormirse. No deana la televisión ni mai. · Colóquese un paño frío y húmedo o [facility] compresa fría sobre la nguyen adolorida de 10 a 21 minutos cada vez. Póngase un paño pascal entre la compresa fría y la piel. · Utilice lazarus toalla húmeda tibia o lazarus almohadilla térmica ajustada a baja temperatura para relajar los músculos tensos del prasad y los hombros.  · Pídale a alguien que le juana masajes suaves en el prasad y los hombros.  · [place] los analgésicos exactamente trey le fueron indicados. ¨ Si el médico le recetó un analgésico, tómelo según las indicaciones. ¨ Si no está tomando un analgésico recetado, pregúntele a garcía médico si puede zehra antony de [employer]. · Tenga cuidado de no zehra analgésicos con mayor frecuencia que la permitida en las indicaciones porque los roscoe de gilberto podrían empeorar o aparecer con mayor frecuencia lazarus vez que el medicamento pierda garcía [place].   · Preste atención a los nuevos síntomas que aparecen con el dolor de [place], [place], debilidad o entumecimiento, cambios en la visión o confusión. Podrían ser señales de un problema más grave. Para prevenir los roscoe de gilberto  · [employer] un diario de trent rosceo de gilberto para que pueda averiguar qué los desencadena. Evitar los desencadenantes podría ayudar a prevenir los roscoe de [place]. Anote cuándo empieza cada dolor de [place], cuánto dura y cómo es el dolor (palpitante, angle, punzante o sordo). Anote cualquier otro síntoma que haya tenido con el dolor de [place], [place] náuseas, destellos de carol o ED, o sensibilidad a la carol brillante o a los ruidos sarah. Anote si el dolor de gilberto ocurrió cerca de garcía menstruación. Enumere todos los factores que pudieran jose f desencadenado el dolor de [place], trey ciertos alimentos (chocolate, queso, vino) u olores, humo, luces brillantes, estrés o falta de sueño. · Encuentre maneras saludables de [facility]. Los roscoe de [place] son más comunes lee o donavan después de un momento estresante. Tómese un tiempo para relajarse antes y después de hacer algo que le haya causado un dolor de gilberto en el pasado. · Trate de mantener trent músculos relajados mediante lazarus buena postura. Revise si tiene [employer] de la Rashida, la kendy, el prasad y los hombros y trate de relajarlos. Cuando se siente en un escritorio, cambie de posición con frecuencia y estírese por 27 segundos cada hora. · Julián suficiente ejercicio y duerma bastante. · Coma en forma regular y samuel. Largos períodos sin comer pueden provocar un dolor de gilberto. · Regálese un masaje. Algunas personas encuentran que los masajes hechos con regularidad son Jannifer Kicks para aliviar la tensión. · Limite la cafeína. No bing demasiado café, té ni sodas. [place] no deje de consumir cafeína de repente, porque eso también puede provocarle roscoe de [place].   · Reduzca la tensión en los ojos a causa de la computadora parpadeando con frecuencia y apartando la mirada de la pantalla a menudo. Asegúrese de tener lentes adecuados y de que garcía monitor esté colocado de manera correcta, trey a un brazo de distancia. · Busque ayuda si tiene depresión o ansiedad. Noris roscoe de [place] podrían relacionarse con estas afecciones. El tratamiento puede prevenir los roscoe de [place] y ayudar con los síntomas de ansiedad o depresión. ¿Cuándo debe pedir ayuda? Llame al 911 en cualquier momento que piense que puede necesitar atención de emergencia. Por ejemplo, llame si:  ? · Tiene señales de un ataque cerebral. Estas pueden incluir:  ¨ Parálisis, entumecimiento o debilidad repentinos en la kendy, el brazo o la pierna, sobre todo si ocurre en un solo lado del cuerpo. ¨ Cambios repentinos en la visión. ¨ Dificultades repentinas para hablar. ¨ Confusión repentina o dificultad para comprender frases sencillas. ¨ Problemas repentinos para caminar o mantener el equilibrio. ¨ Dolor de [place] intenso y repentino, distinto de los roscoe de gilberto anteriores. ?Llame a garcía médico ahora mismo o busque atención médica inmediata si:  ? · Tiene un dolor de gilberto nuevo o peor. ? · García dolor de gilberto [place]. ¿Dónde puede encontrar más información en inglés? Moody Labella a http://negrita-loren.info/. Escriba N846 en la búsqueda para aprender más acerca de \"Dolor de gilberto: Instrucciones de cuidado - [ Headache: Care Instructions ]. \"  Revisado: 14 octubre, 2016  Versión del contenido: 11.4  © 9014-5609 Healthwise, Incorporated. Las instrucciones de cuidado fueron adaptadas bajo licencia por [employer] (which disclaims liability or warranty for this information). Si usted tiene [place] afección médica o sobre estas instrucciones, siempre pregunte a garcía profesional de jean-pierre. Healthwise, Incorporated niega toda garantía o responsabilidad por garcía uso de esta información.

## 2017-12-06 NOTE — ED PROVIDER NOTES
HPI Comments: 34 y/o female with PMHx of HTN and gestational diabetes, presenting with complaint of headache. She reports a 2 week history of dry cough, followed by onset of headache, nausea and vomiting last night. The pain is 8/10, aching, located at the top of her head, does not radiate. She has not taken anything for pain, but has been taking mucinex which has helped with her cough. She denies fevers, chills, shortness of breath, chest pain, abdominal pain, light-headedness, vision changes, focal weakness or numbness. The history is provided by the patient. Past Medical History:   Diagnosis Date    Cholestasis of pregnancy in third trimester 11/20/2015    Constipation     Gestational diabetes 9/25/2015    Hypertension     possibly per pt report in [place]. Denied it 5/5/15       History reviewed. No pertinent surgical history. Family History:   Problem Relation Age of Onset    Diabetes Mother        Social History     Social History    Marital status: SINGLE     Spouse name: N/A    Number of children: N/A    Years of education: N/A     Occupational History    Not on file. Social History Main Topics    Smoking status: Never Smoker    Smokeless tobacco: Never Used    Alcohol use No    Drug use: No    Sexual activity: Yes     Partners: Male     Birth control/ protection: None, Condom     Other Topics Concern    Not on file     Social History Narrative    ** Merged History Encounter **         ** Merged History Encounter **            ALLERGIES: Review of patient's allergies indicates no known allergies. Review of Systems   Constitutional: Negative for chills and fever. Eyes: Negative for visual disturbance. Respiratory: Positive for cough. Negative for shortness of breath. Cardiovascular: Negative for chest pain. Gastrointestinal: Positive for nausea and vomiting. Negative for abdominal pain. Musculoskeletal: Negative for back pain and neck pain.    Skin: Negative for rash. Neurological: Positive for weakness (generalized) and headaches. Negative for syncope, light-headedness and numbness. All other systems reviewed and are negative. Vitals:    12/06/17 1443   BP: 134/81   Pulse: 82   Resp: 16   Temp: 98.2 °F (36.8 °C)   SpO2: 98%   Weight: 88.7 kg (195 lb 8 oz)   Height: 5' 2\" (1.575 m)            Physical Exam   Constitutional: She is oriented to person, place, and time. She appears well-developed and well-nourished. No distress. HENT:   Head: Normocephalic and atraumatic. Eyes: Conjunctivae and EOM are normal. Pupils are equal, round, and reactive to light. Neck: Normal range of motion. Neck supple. Cardiovascular: Normal rate, regular rhythm and normal heart sounds. Pulmonary/Chest: Effort normal and breath sounds normal.   Abdominal: Soft. She exhibits no distension. There is no tenderness. Neurological: She is alert and oriented to person, place, and time. CN 2-12 intact. 5/5 strength of bilateral upper and lower extremities, no sensory deficits. Skin: Skin is warm and dry. She is not diaphoretic. Nursing note and vitals reviewed. MDM  Number of Diagnoses or Management Options  Nonintractable headache, unspecified chronicity pattern, unspecified headache type:      Amount and/or Complexity of Data Reviewed  Tests in the radiology section of CPT®: ordered and reviewed  Independent visualization of images, tracings, or specimens: yes (CXR)    Patient Progress  Patient progress: stable    ED Course       Procedures      34 y/o female with PMHx of HTN and gestational diabetes, presenting with complaint of headache. History and exam as above, most consistent with tension headache vs migraine, likely related to cough. DDx for cough includes pneumonia vs viral URI. Vitals within normal limits. No neuro deficits on exam to suggest CVA, ICH or other acute intracranial abnormality. No nuchal rigidity or fever, doubt meningitis.  Urine preg is negative. Will obtain CXR and will give headache cocktail (fluids, reglan, benadryl). CXR, read by radiology and independently visualized and interpreted by myself, reveals no evidence of pneumonia or other acute cardiopulmonary abnormalities. 1 hour after meds administered, pain is down to 4/10. The patient states she feels well enough to go home - agree with discharge disposition. Recommended prompt PCP follow up. Strict ED return precautions discussed and provided in writing at time of discharge. The patient verbalized understanding and agreement with this plan.

## 2018-06-13 ENCOUNTER — OFFICE VISIT (OUTPATIENT)
Dept: FAMILY MEDICINE CLINIC | Age: 28
End: 2018-06-13

## 2018-06-13 VITALS
RESPIRATION RATE: 16 BRPM | OXYGEN SATURATION: 98 % | HEIGHT: 62 IN | DIASTOLIC BLOOD PRESSURE: 76 MMHG | WEIGHT: 199 LBS | TEMPERATURE: 98.8 F | SYSTOLIC BLOOD PRESSURE: 116 MMHG | HEART RATE: 74 BPM | BODY MASS INDEX: 36.62 KG/M2

## 2018-06-13 DIAGNOSIS — N92.6 MISSED MENSES: Primary | ICD-10-CM

## 2018-06-13 LAB
HCG URINE, QL. (POC): POSITIVE
VALID INTERNAL CONTROL?: YES

## 2018-06-13 NOTE — MR AVS SNAPSHOT
[address] 
[phone] Patient: Ursula Wakefield MRN: [id number] EKF:[id number] Visit Information Elan Heaton Personal Médico Departamento Teléfono del Dep. Número de visita 6/13/2018  8:20 AM Ross Arriaga MD [address] [place] [address] [phone] Upcoming Health Maintenance Date Due  
 PAP AKA CERVICAL CYTOLOGY 5/21/2018 Influenza Age 5 to Adult 8/1/2018 DTaP/Tdap/Td series (2 - Td) 9/4/2025 Alergias  Review Complete El: 6/13/2018 Por: Romaine Rodríguez LPN A partir del:  6/13/2018 No Known Allergies Vacunas actuales Revisadas el:  9/4/2015 Jeremy Davis Influenza Vaccine (Quad) PF 10/2/2015 Influenza Vaccine Split 10/17/2012 Tdap 9/4/2015 No revisadas esta visita You Were Diagnosed With   
  
 Herminia Orn Missed menses    -  Primary ICD-10-CM: N92.6 ICD-9-CM: 626.4 Partes vitales PS Pulso Temperatura Resp Varna ( percentil de crecimiento) Peso (percentil de crecimiento) 116/76 74 98.8 °F (37.1 °C) (Oral) 16 5' 2\" (1.575 m) 199 lb (90.3 kg) LMP (última madi) SpO2 BMI (IMC) Estado obstétrico Estatus de tabaquísmo 04/13/2018 98% 36.4 kg/m2 Having regular periods Never Smoker Historial de signos vitales BMI and BSA Data Body Mass Index Body Surface Area  
 36.4 kg/m 2 1.99 m 2 Nikole [facility] Name Phone [id number] [place], [address] [facility] [phone] Solomon lista de medicamentos actualizada Lista actualizada 6/13/18  9:00 AM.  Karilluvia Demi use solomon lista de medicamentos más reciente. levonorgestrel 20 mcg/24 hr (5 years) Iud También conocido trey:  MIRENA  
1 Each by IntraUTERine route once. polyethylene glycol 17 gram packet También conocido trey:  George Fernandes Take 1 Packet by mouth daily as needed. prenatal vit no.112-folate no6 1 mg Chew Take 1 Tab by mouth daily. psyllium seed-sucrose Powd También conocido trey:  METAMUCIL (SUGAR) Take 1 teaspoon three times a day with meals. Impresion de recetas Refills  
 prenatal vit no.112-folate no6 1 mg chew 3 Sig: Take 1 Tab by mouth daily. Class: Print Route: Oral  
  
Hicimos lo siguiente AMB POC URINE PREGNANCY TEST, VISUAL COLOR COMPARISON [80688 CPT(R)] Instrucciones para el Paciente Allan Gess - [ Learning About Pregnancy ] Instrucciones de cuidado García jean-pierre lee las primeras semanas del [facility] es de particular importancia para la jean-pierre de garcía bebé. Cuídese. Cualquier cosa que perjudique garcía organismo puede perjudicar a garcía bebé. Asegúrese de asistir a todas trent citas médicas. Los chequeos médicos regulares les ayudarán a usted y a garcía bebé a mantenerse saludables. La atención de seguimiento es lazarus parte clave de garcía tratamiento y seguridad. Asegúrese de hacer y acudir a todas las citas, y llame a garcía médico si está teniendo problemas. También es lazarus buena idea saber los resultados de los exámenes y mantener lazarus lista de los medicamentos que terrie. Cómo puede cuidarse en el hogar? ? Dieta ? · Siga lazarus dieta balanceada. Asegúrese de incluir en garcía dieta abundantes frijoles (habichuelas), arvejas (chícharos) y verduras de hojas verdes. ? · No se saltee las comidas ni pase muchas horas sin comer. Si tiene náuseas, trate de comer un refrigerio pequeño y saludable cada 2 a 3 horas. ? · No consuma pescados que tengan [address] de gulshan, trey tiburón, pez delaney o [place]. No coma más de lazarus nusrat de atún a la semana. ? · [place] abundantes líquidos, suficientes para que garcía orina sea de color amarillo anish o transparente trey el agua.  Si tiene [employer] riñones, el corazón o el hígado y tiene que Chai's líquidos, hable con garcía médico antes de aumentar garcía consumo. ? · Reduzca la cafeína, trey el café, el té y las bebidas de cola. ? · No bing alcohol, trey cerveza, vino o licor paulina. ? · Pastura un multivitamínico que contenga al menos 400 microgramos (mcg) de ácido fólico para ayudar a prevenir los defectos congénitos (de nacimiento). El cereal enriquecido y el dao integral son buenas wallace adicionales de ácido fólico.  
? · Aumente el calcio en garcía Rui [place]. Trate de beber un cuarto de galón de [employer]. También podría zehra suplementos de calcio y elegir alimentos trey el queso y el yogur. ? C/ Ash[address] ? · Asegúrese de asistir a las citas de seguimiento. ? · Descanse lo suficiente. Mientras esté embarazada podría sentirse más cansada de lo normal.  
? · Julián por lo menos 30 minutos de ejercicio la mayoría de los días de la [place]. Caminar es lazarus buena opción. Si no ha hecho ejercicio en el pasado, comience poco a poco. Julián varias caminatas cortas todos los días. ? · No fume. Si necesita ayuda para dejar de fumar, hable con garcía médico sobre los programas para dejar de fumar. Éstos pueden aumentar trent probabilidades de dejar el hábito para siempre. ? · No toque heces de filemon ni garcía caja de excrementos. Además, lávese las shana luego de manipular carne cruda y cocine samuel toda la carne antes de comerla. Use guantes cuando trabaje en el jardín y [employer] shana cuando termine. Las heces de [place], la carne cruda o poco cocida, y la mehrdad contaminada pueden causar infecciones que podrían perjudicar a garcía bebé o conducir a un aborto espontáneo. ? · No use \"jacuzzis\" ni saunas. Elevar la temperatura corporal podría perjudicar a garcía bebé. ? · Evite los gases de las sustancias químicas y la pintura, o los venenos. ? · No use drogas ilegales ni bing alcohol. Medicamentos ? · Revise todos los medicamentos que esté tomando con garcía Gerarda Negar sea necesario cambiar algunos de trent medicamentos de rutina para proteger al bebé. ? · [place] acetaminofén (Tylenol) para [place], trey dolor de [place] o de espalda leves o fiebre leve con síntomas de resfriado. No utilice medicamentos antiinflamatorios no esteroideos (ANGELIKA), tales trey ibuprofeno (Advil, Motrin) o naproxeno (Aleve), a menos que garcía médico lo autorice. ? · No tome dos o más analgésicos (medicamentos para el dolor) al [employer] tiempo a menos que el médico se lo haya indicado. Muchos analgésicos contienen acetaminofén, es decir, Tylenol. El exceso de acetaminofén (Tylenol) puede ser dañino. ? · Med[employer]onic exactamente trey le fueron recetados. Llame a garcía médico si kaylynn estar teniendo problemas con garcía medicamento. ?[address] ? · Si siente náuseas al levantarse, pruebe zehra un refrigerio pequeño (trey galletas saladas) antes de salir de la cama. Dese algún tiempo para digerir el refrigerio y salga de la cama lentamente. ? · No se saltee las comidas ni pase mucho tiempo sin comer. Un estómago [address] náuseas. ? · Consuma comidas pequeñas y frecuentes en lugar de kelly comidas abundantes al día. ? · [place] abundantes líquidos. Las bebidas deportivas, trey Gatorade o [place], son buenas opciones. ? · Consuma alimentos ricos en proteínas cristina bajos en grasas. ? · Si está tomando suplementos de aida, pregúntele a garcía médico si son necesarios. El aida puede [employer]. ? · Evite cualquier [employer] produzca náuseas, trey el del café. ? · Descanse mucho. Las náuseas del embarazo podrían empeorar si está cansada. Dónde puede encontrar más información en inglés? Jos Preciado a [email or web address]://negrita-loren.info/. Federico Pollock T977 en la búsqueda para aprender más acerca de \"Aprenda sobre el [place] - [ Oneda Crape About Pregnancy ]. \" Revisado: 16 marzo, 2017 Versión del contenido: 11.4 © 9387-9491 Healthwise, Incorporated. Las instrucciones de cuidado fueron adaptadas bajo licencia por Good Help Connections (which disclaims liability or warranty for this information). Si usted tiene [place] afección médica o sobre estas instrucciones, siempre pregunte a garcía profesional de jean-pierre. Healthwise, Incorporated niega toda garantía o responsabilidad por garcía uso de esta información. Introducing [address] [facility] introduce portal paciente MyChart . Ahora se puede acceder a partes de garcía expediente médico, enviar por correo electrónico la oficina de garcía médico y solicitar renovaciones de medicamentos en línea. En garcía navegador de Internet , Ike Silver a [email or web address] Juana clic en el usuario por She [place]? Riki Rumjasvir clic aquí en la sesión [facility]. Verá la página de registro [place]. Ingrese garcía código de [employer] mary y trey aparece a continuación. Usted no tendrá que [employer] código después de jose f completado el proceso de registro . Si usted no se inscribe antes de la fecha de caducidad , debe solicitar un nuevo código. · MyChart Código de acceso : [id number] Expires: 9/11/2018  9:00 AM 
 
Ingresa los últimos cuatro dígitos de garcía Número de Seguro Social ( xxxx ) y fecha de nacimiento ( dd / mm / aaaa ) trey se indica y juana clic en Enviar. [place] será llevado a la siguiente página de registro . Crear un ID MyChart . Esta será garcía ID de inicio de sesión de MyChart y no puede ser [place] , por lo que pensar en lazarus que es Daija Ashley y fácil de recordar . Crear lazarus contraseña MyChart . Usted puede cambiar garcía contraseña en cualquier momento . Ingrese garcía Password Reset de preguntas y Marte . [place] se puede utilizar en un momento posterior si usted olvida garcía contraseña.  
Introduzca garcía dirección de correo electrónico . Ankit Alexander recibirá Екатерина notificación por correo electrónico cuando la nueva información está disponible en MyChart . Felicie Abler clic en Registrarse. Vineet Ing anita y descargar porciones de garcía expediente médico. 
Julián clic en el enlace de descarga del menú Resumen para descargar lazarus copia portátil de garcía información médica . Si tiene [employer] , por favor visite la sección de preguntas frecuentes del sitio web [email or web address] . Recuerde, ChoreMonstert NO es que se utilizará para las necesidades urgentes. Para emergencias médicas , llame al 911 . Ahora disponible en garcía iPhone y Android ! Por favor proporcione scott resumen de la documentación de cuidado a garcía próximo proveedor. Your primary care clinician is listed as Chente Mc. If you have any questions after today's visit, please call [phone].

## 2018-06-13 NOTE — PATIENT INSTRUCTIONS
Yudith Barlow - [ Learning About Pregnancy ]  Instrucciones de cuidado    Solomon jean-pierre lee las primeras semanas del embarazo es de particular importancia para la jean-pierre de solomon bebé. Cuídese. Cualquier cosa que perjudique solomon organismo puede perjudicar a solomon bebé. Asegúrese de asistir a todas trent citas médicas. Los chequeos médicos regulares les ayudarán a usted y a solomon bebé a mantenerse saludables. La atención de seguimiento es lazarus parte clave de solomon tratamiento y seguridad. Asegúrese de hacer y acudir a todas las citas, y llame a solomon médico si está teniendo problemas. También es lazarus buena idea saber los resultados de los exámenes y mantener lazarus lista de los medicamentos que terrie. ¿Cómo puede cuidarse en el hogar? ? Dieta  ? · Siga lazarus dieta balanceada. Asegúrese de incluir en solomon dieta abundantes frijoles (habichuelas), arvejas (chícharos) y verduras de hojas verdes. ? · No se saltee las comidas ni pase muchas horas sin comer. Si tiene náuseas, trate de comer un refrigerio pequeño y saludable cada 2 a 3 horas. ? · No consuma pescados que tengan [address] de gulshan, trey tiburón, pez delaney o [place]. No coma más de lazarus nusrat de atún a la semana. ? · Bing abundantes líquidos, suficientes para que solomon orina sea de color amarillo anish o transparente trey el agua. Si tiene [employer], el corazón o el hígado y tiene que [place] líquidos, hable con solomon médico antes de aumentar solomon consumo. ? · Reduzca la cafeína, trey el café, el té y las bebidas de cola. ? · No bing alcohol, trey cerveza, vino o licor paulina. ? · [place] un multivitamínico que contenga al menos 400 microgramos (mcg) de ácido fólico para ayudar a prevenir los defectos congénitos (de nacimiento). El cereal enriquecido y el dao integral son buenas wallace adicionales de ácido fólico.   ? · Aumente el calcio en solomon [place]. Trate de beber un cuarto de galón de [employer].  También podría zehra suplementos de calcio y elegir alimentos trey el queso y el yogur. ? [address]  ? · Asegúrese de asistir a las citas de seguimiento. ? · Descanse lo suficiente. Mientras esté embarazada podría sentirse más cansada de lo normal.   ? · Julián por lo menos 30 minutos de ejercicio la mayoría de los días de la [place]. Caminar es lazarus buena opción. Si no ha hecho ejercicio en el pasado, comience poco a poco. Julián varias caminatas cortas todos los días. ? · No fume. Si necesita ayuda para dejar de fumar, hable con garcía médico sobre los programas para dejar de fumar. Éstos pueden aumentar trent probabilidades de dejar el hábito para siempre. ? · No toque heces de filemon ni garcía caja de excrementos. Además, lávese las shana luego de manipular carne cruda y cocine samuel toda la carne antes de comerla. Use guantes cuando trabaje en el jardín y [employer] shana cuando termine. Las heces de [place], la carne cruda o poco cocida, y la mehrdad contaminada pueden causar infecciones que podrían perjudicar a garcía bebé o conducir a un aborto espontáneo. ? · No use \"jacuzzis\" ni saunas. Elevar la temperatura corporal podría perjudicar a garcía bebé. ? · Evite los gases de las sustancias químicas y la pintura, o los venenos. ? · No use drogas ilegales ni bing alcohol. Medicamentos  ? · Revise todos los medicamentos que esté tomando con garcía Adah Modest sea necesario cambiar algunos de trent medicamentos de rutina para proteger al bebé. ? · [place] acetaminofén (Tylenol) para [facility], trey dolor de [place] o de espalda leves o fiebre leve con síntomas de resfriado. No utilice medicamentos antiinflamatorios no esteroideos (ANGELIKA), tales trey ibuprofeno (Advil, Motrin) o naproxeno (Aleve), a menos que garcía médico lo autorice. ? · No tome dos o más analgésicos (medicamentos para el dolor) al [employer] tiempo a menos que el médico se lo haya indicado. Muchos analgésicos contienen acetaminofén, es decir, Tylenol.  El exceso de acetaminofén (Tylenol) puede ser dañino. ? · [employer] exactamente trey le fueron recetados. Llame a garcía médico si kaylynn estar teniendo problemas con garcía medicamento. ?[address]  ? · Si siente náuseas al levantarse, pruebe zehra un refrigerio pequeño (trey galletas saladas) antes de salir de la cama. Dese algún tiempo para digerir el refrigerio y salga de la cama lentamente. ? · No se saltee las comidas ni pase mucho tiempo sin comer. Un estómago [address] náuseas. ? · Consuma comidas pequeñas y frecuentes en lugar de kelly comidas abundantes al día. ? · [place] abundantes líquidos. Las bebidas deportivas, trey Gatorade o [place], son buenas opciones. ? · Consuma alimentos ricos en proteínas cristina bajos en grasas. ? · Si está tomando suplementos de aida, pregúntele a garcía médico si son necesarios. El aida puede [employer]. ? · Evite cualquier [employer] produzca náuseas, trey el del café. ? · Descanse mucho. Las náuseas del embarazo podrían empeorar si está cansada. ¿Dónde puede encontrar más información en inglés? Jeison Carlos a http://negrita-loren.info/. Lenora AMANDA en la búsqueda para aprender más acerca de \"Aprenda sobre el Phani Bardales - [ Shikha Diver About Pregnancy ]. \"  Revisado: 16 marzo, 2017  Versión del contenido: 11.4  © 3370-2157 Healthwise, Incorporated. Las instrucciones de cuidado fueron adaptadas bajo licencia por [employer] (which disclaims liability or warranty for this information). Si usted tiene [place] afección médica o sobre estas instrucciones, siempre pregunte a garcía profesional de jean-pierre. [facility] niega toda garantía o responsabilidad por garcía uso de esta información.

## 2018-06-13 NOTE — PROGRESS NOTES
Progress Note    Patient: Sanford Castillo MRN: [id number]  SSN: [id number]    YOB: 1990  Age: 33 y.o. Sex: female        Chief Complaint   Patient presents with    Missed Menses         Subjective:     Patient presents for missed menses  Last menses 4/13/18  No bleeding, abdominal pain, loss of fluid    She would prefer a Cypriot speaking provider if possible      Current and past medical information:    Current Medications after this visit[de-identified]     Current Outpatient Prescriptions   Medication Sig    prenatal vit no.112-folate no6 1 mg chew Take 1 Tab by mouth daily.  psyllium seed-sucrose (METAMUCIL, SUGAR,) powd Take 1 teaspoon three times a day with meals.  polyethylene glycol (MIRALAX) 17 gram packet Take 1 Packet by mouth daily as needed.  levonorgestrel (MIRENA) 20 mcg/24 hr (5 years) IUD 1 Each by IntraUTERine route once. No current facility-administered medications for this visit. Patient Active Problem List    Diagnosis Date Noted    Cholestasis of pregnancy in third trimester 11/20/2015    Elevated transaminase level 11/20/2015    Intrahepatic cholestasis of pregnancy 11/20/2015    Poor weight gain of pregnancy 11/19/2015    Obesity (BMI 30.0-34.9) 04/02/2015       Past Medical History:   Diagnosis Date    Cholestasis of pregnancy in third trimester 11/20/2015    Constipation     Gestational diabetes 9/25/2015    Hypertension     possibly per pt report in [place]. Denied it 5/5/15       No Known Allergies    History reviewed. No pertinent surgical history.     Social History     Social History    Marital status: SINGLE     Spouse name: N/A    Number of children: N/A    Years of education: N/A     Social History Main Topics    Smoking status: Never Smoker    Smokeless tobacco: Never Used    Alcohol use No    Drug use: No    Sexual activity: Yes     Partners: Male     Birth control/ protection: None, Condom     Other Topics Concern    None Social History Narrative    ** Merged History Encounter **         ** Merged History Encounter **          Review of Systems   Constitutional: Negative for chills and fever. Objective:     Vitals:    06/13/18 0829   BP: 116/76   Pulse: 74   Resp: 16   Temp: 98.8 °F (37.1 °C)   TempSrc: Oral   SpO2: 98%   Weight: 199 lb (90.3 kg)   Height: 5' 2\" (1.575 m)      Body mass index is 36.4 kg/(m^2). Physical Exam   Constitutional: She is oriented to person, place, and time and well-developed, well-nourished, and in no distress. No distress. HENT:   Head: Normocephalic and atraumatic. Eyes: Conjunctivae and EOM are normal. Right eye exhibits no discharge. Left eye exhibits no discharge. No scleral icterus. Cardiovascular: Normal rate and regular rhythm. Exam reveals no gallop and no friction rub. No murmur heard. Pulmonary/Chest: Effort normal. She has no wheezes. She has no rales. Abdominal: Soft. Bowel sounds are normal. She exhibits no distension. There is no tenderness. Musculoskeletal: She exhibits no edema or tenderness. Neurological: She is alert and oriented to person, place, and time. No cranial nerve deficit. Skin: Skin is warm and dry. She is not diaphoretic. Nursing note and vitals reviewed. Recent Results (from the past 12 hour(s))   AMB POC URINE PREGNANCY TEST, VISUAL COLOR COMPARISON    Collection Time: 06/13/18  8:46 AM   Result Value Ref Range    VALID INTERNAL CONTROL POC Yes     HCG urine, Ql. (POC) Positive Negative           Assessment and orders:     Encounter Diagnoses     ICD-10-CM ICD-9-CM   1. Missed menses N92.6 626.4     Diagnoses and all orders for this visit:    1. Missed menses  -     AMB POC URINE PREGNANCY TEST, VISUAL COLOR COMPARISON    Other orders  -     prenatal vit no.112-folate no6 1 mg chew; Take 1 Tab by mouth daily.       Based on LMP, she is likely 8 weeks  Will follow up w Dr. Lupe Rodriguez for initial prenatal in 2 weeks  Started on prenatal vitamins      Plan of care:  Discussed diagnoses in detail with patient. Medication risks/benefits/side effects discussed with patient. All of the patient's questions were addressed. The patient understands and agrees with our plan of care. The patient knows to call back if they are unsure of or forget any changes we discussed today or if the symptoms change. The patient received an After-Visit Summary which contains VS, orders, medication list and allergy list. This can be used as a \"mini-medical record\" should they have to seek medical care while out of town.     Patient Care Team:  Rianna Camargo MD as PCP - General (Family Practice)  None    Follow-up Disposition: Not on File    Future Appointments  Date Time Provider Torie Melchor   6/27/2018 10:30 AM Aminata Vang MD [facility] 10.       Signed By: Isaura Gordon MD     June 13, 2018

## 2018-06-18 NOTE — PROGRESS NOTES
I reviewed with the resident the medical history and the resident's findings on the physical examination. I discussed with the resident the patient's diagnosis and concur with the plan.     Return for initial PNV

## 2018-06-27 ENCOUNTER — HOSPITAL ENCOUNTER (OUTPATIENT)
Dept: LAB | Age: 28
Discharge: HOME | End: 2018-06-27
Payer: SELF-PAY

## 2018-06-27 ENCOUNTER — INITIAL PRENATAL (OUTPATIENT)
Dept: FAMILY MEDICINE CLINIC | Age: 28
End: 2018-06-27

## 2018-06-27 VITALS
DIASTOLIC BLOOD PRESSURE: 64 MMHG | WEIGHT: 199 LBS | HEART RATE: 80 BPM | OXYGEN SATURATION: 98 % | BODY MASS INDEX: 36.62 KG/M2 | TEMPERATURE: 99.2 F | HEIGHT: 62 IN | SYSTOLIC BLOOD PRESSURE: 97 MMHG | RESPIRATION RATE: 18 BRPM

## 2018-06-27 DIAGNOSIS — Z34.80 ENCOUNTER FOR SUPERVISION OF OTHER NORMAL PREGNANCY, UNSPECIFIED TRIMESTER: Primary | ICD-10-CM

## 2018-06-27 DIAGNOSIS — E66.9 OBESITY (BMI 30-39.9): ICD-10-CM

## 2018-06-27 LAB
BILIRUB UR QL STRIP: NEGATIVE
GLUCOSE UR-MCNC: NEGATIVE MG/DL
KETONES P FAST UR STRIP-MCNC: NEGATIVE MG/DL
PH UR STRIP: 7.5 [PH] (ref 4.6–8)
PROT UR QL STRIP: NEGATIVE
SP GR UR STRIP: 1.02 (ref 1–1.03)
UA UROBILINOGEN AMB POC: NORMAL (ref 0.2–1)
URINALYSIS CLARITY POC: CLEAR
URINALYSIS COLOR POC: YELLOW
URINE BLOOD POC: NEGATIVE
URINE LEUKOCYTES POC: NEGATIVE
URINE NITRITES POC: NEGATIVE

## 2018-06-27 PROCEDURE — 87491 CHLMYD TRACH DNA AMP PROBE: CPT | Performed by: STUDENT IN AN ORGANIZED HEALTH CARE EDUCATION/TRAINING PROGRAM

## 2018-06-27 PROCEDURE — 88175 CYTOPATH C/V AUTO FLUID REDO: CPT | Performed by: STUDENT IN AN ORGANIZED HEALTH CARE EDUCATION/TRAINING PROGRAM

## 2018-06-27 NOTE — PROGRESS NOTES
[facility] [address] [place] [address]   Office [phone], Fax [phone]      Subjective:   Lynn Ingram is a 33 y.o. female who is being seen today for her first obstetrical visit. Hungarian interpretor used. This is a planned pregnancy. She is at 10w5d gestation by  (LMP 2018). [id number]  1st  pregnancy - Delivery method: . Fetal weight: 6.5lbs. None:.  2nd pregnancy - Delivery method: . Fetal weight: 6.8lbs. Complications: cholestasis and GDM. 2015 pregnancy - current     History of GDM or DM? Yes, controlled by diet    History of GHTN or HTN? No    History of pre-eclampsia? Relationship with FOB: spouse, living together. She's taking prenatal vitamins. Desires first trimester dating ultrasound? Yes    Desires second trimester fetal anatomy ultrasound? Yes    Desires genetic testing for Down's Syndrome? Yes      Allergies- reviewed:   No Known Allergies      Medications- reviewed:   Current Outpatient Prescriptions   Medication Sig    prenatal vit no.112-folate no6 1 mg chew Take 1 Tab by mouth daily.  psyllium seed-sucrose (METAMUCIL, SUGAR,) powd Take 1 teaspoon three times a day with meals.  polyethylene glycol (MIRALAX) 17 gram packet Take 1 Packet by mouth daily as needed.  levonorgestrel (MIRENA) 20 mcg/24 hr (5 years) IUD 1 Each by IntraUTERine route once. No current facility-administered medications for this visit. Past Medical History- reviewed:  Past Medical History:   Diagnosis Date    Cholestasis of pregnancy in third trimester 2015    Constipation     Gestational diabetes 2015    Hypertension     possibly per pt report in [place]. Denied it 5/5/15         Past Surgical History- reviewed:   History reviewed. No pertinent surgical history.       Social History- reviewed:  Social History     Social History    Marital status: SINGLE     Spouse name: N/A    Number of children: N/A    Years of education: N/A     Occupational History    Not on file.      Social History Main Topics    Smoking status: Never Smoker    Smokeless tobacco: Never Used    Alcohol use No    Drug use: No    Sexual activity: Yes     Partners: Male     Birth control/ protection: None, Condom     Other Topics Concern    Not on file     Social History Narrative    ** Merged History Encounter **         ** Merged History Encounter **            Immunizations- reviewed:   Immunization History   Administered Date(s) Administered    Influenza Vaccine (Quad) PF 10/02/2015    Influenza Vaccine Split 10/17/2012    Tdap 09/04/2015         ROS  : Denies vaginal bleeding and leaking of fluid  GI: Endorses occasional nausea and vomiting      Objective:     Visit Vitals    BP 97/64    Pulse 80    Temp 99.2 °F (37.3 °C) (Oral)    Resp 18    Ht 5' 2\" (1.575 m)    Wt 199 lb (90.3 kg)    LMP 04/13/2018    SpO2 98%    BMI 36.4 kg/m2       Physical Exam:  GENERAL APPEARANCE: alert, well appearing, in no apparent distress  HEAD: normocephalic, atraumatic   LUNGS: clear to auscultation, no wheezes, rales or rhonchi, symmetric air entry  HEART: regular rate and rhythm, no murmurs  ABDOMEN: soft, nontender, nondistended, no abnormal masses, no epigastric pain, obese and FHT present  BACK: no CVA tenderness  EXTERNAL GENITALIA: normal appearing vulva with no masses, tenderness or lesions  VAGINA: no abnormal discharge or lesions  CERVIX: no lesions or cervical motion tenderness  UTERUS: consistent with 10 weeks  ADNEXA: no masses palpable and nontender  EXTREMITIES: no redness or tenderness in the calves or thighs, no edema  NEUROLOGICAL: alert, oriented, normal speech, no focal findings or movement disorder noted  SKIN: normal coloration and turgor, no rashes    Exam chaperoned by Emmet Dakin    Labs:    Recent Results (from the past 12 hour(s))   AMB POC URINALYSIS DIP STICK AUTO W/O MICRO    Collection Time: 06/27/18 10:31 AM Result Value Ref Range    Color (UA POC) Yellow     Clarity (UA POC) Clear     Glucose (UA POC) Negative Negative    Bilirubin (UA POC) Negative Negative    Ketones (UA POC) Negative Negative    Specific gravity (UA POC) 1.025 1.001 - 1.035    Blood (UA POC) Negative Negative    pH (UA POC) 7.5 4.6 - 8.0    Protein (UA POC) Negative Negative    Urobilinogen (UA POC) 0.2 mg/dL 0.2 - 1    Nitrites (UA POC) Negative Negative    Leukocyte esterase (UA POC) Negative Negative         Assessment:     Pregnancy at Unknown by 10w5d      ICD-10-CM ICD-9-CM    1. Encounter for supervision of other normal pregnancy, unspecified trimester Z34.80 V22.1 AMB POC URINALYSIS DIP STICK AUTO W/O MICRO      CBC WITH AUTOMATED DIFF      BLOOD TYPE, (ABO+RH)      ANTIBODY SCREEN      RUBELLA AB, IGG      HEP B SURFACE AG      T PALLIDUM SCREEN W/REFLEX      HIV 1/2 AG/AB, 4TH GENERATION,W RFLX CONFIRM      CULTURE, URINE      PAP IG, CT-NG, RFX APTIMA HPV ASCUS ([id number], [id number]))      CHLAMYDIA/GC PCR      CHLAMYDIA/GC PCR   2. Obesity (BMI 30-39. 9) E66.9 278.00 HEMOGLOBIN A1C WITH EAG      GLUCOSE, 1 HR PP         Plan:   Initial Prenatal visit  · Initial labs/specimens collected (CBC, blood type & screen, Rh antibody screen, rubella titer, HBsAg titer, RPR, HIV, UA w/ cx, pap smear, gonorrhea/chlamydia cx)  · Recommended to continue to take  prenatal vitamins  · Patient scheduled for 1st trimester dating ultrasound on 2018  · Request for 2nd trimester fetal anatomy ultrasound will be faxed to [facility] at next visit  · AFP tetra screen be performed during this pregnancy per patient request at second trimester  · Follow-up in 4 week(s) for routine OB care    History of Gestational Diabetes  - In 2015,  diet controlled, birth weight 3.23 kg, delivered via  with no complications  - Will obtain HbA1c and 1 hr GTT today    Obesity BMI of 36.40  - Recommended total weight gain range between 11-20lbs  - Recommended to not try to lose weight, mild to moderate exercise 3 times a week, please avoid activities that can cause abdominal trauma    The patient was counseled on the dangers of tobacco use and alcohol. Emergency contact info confirmed:   Cell: [phone] -     Orders Placed This Encounter    CULTURE, URINE    CBC WITH AUTOMATED DIFF    RUBELLA AB, IGG    HEP B SURFACE AG    T PALLIDUM SCREEN W/REFLEX    HIV 1/2 AG/AB, 4TH GENERATION,W RFLX CONFIRM    HEMOGLOBIN A1C WITH EAG    GLUCOSE, 1 HR PP    CHLAMYDIA/GC PCR     Standing Status:   Future     Standing Expiration Date:   6/28/2019     Order Specific Question:   Sample source     Answer:   Swab [241]     Order Specific Question:   Specimen source     Answer:   Endocervix [350]    CHLAMYDIA/GC PCR     Order Specific Question:   Sample source     Answer:   Swab [241]     Order Specific Question:   Specimen source     Answer:   Endocervix [350]    AMB POC URINALYSIS DIP STICK AUTO W/O MICRO    BLOOD TYPE, (ABO+RH)    ANTIBODY SCREEN    PAP IG, CT-NG, RFX APTIMA HPV ASCUS ([id number], [id number]))     Order Specific Question:   Pap Source? Answer:   Endocervical     Order Specific Question:   Total Hysterectomy? Answer:   No     Order Specific Question:   Supracervical Hysterectomy? Answer:   No     Order Specific Question:   Post Menopausal?     Answer:   No     Order Specific Question:   Hormone Therapy? Answer:   No     Order Specific Question:   IUD? Answer:   No     Order Specific Question:   Abnormal Bleeding? Answer:   No     Order Specific Question:   Pregnant     Answer:   Yes     Order Specific Question:   Post Partum? Answer:   No         I have discussed the diagnosis with the patient and the intended plan as seen in the above orders. The patient has received an after-visit summary and questions were answered concerning future plans. I have discussed medication side effects and warnings with the patient as well.  Informed pt to return to the office or go to the ER if she experiences vaginal bleeding, vaginal discharge, leaking of fluid, pelvic cramping.       Pt  discussed with Dr. Higinio Erazo (attending physician)    Resident, [address] Marilyn Patel MD

## 2018-06-27 NOTE — PROGRESS NOTES
Chief Complaint   Patient presents with    Initial Prenatal Visit          1. Have you been to the ER, urgent care clinic since your last visit? Hospitalized since your last visit? No    2. Have you seen or consulted any other health care providers outside of the [address] [place] [address] since your last visit? Include any pap smears or colon screening. No     Patient denies any bleeding, cramping or leakage of fluid.      Patient complains of feeling nausea        [facility]  Interpretor :[id number]

## 2018-06-27 NOTE — MR AVS SNAPSHOT
2100 [facility] 1007 [facility] 
[phone] Patient: Deann Love MRN: [id number] Visit Information Donnie Horn Personal Médico Departamento Teléfono del Dep. Número de visita 6/27/2018 10:30 AM Artur Noel MD 1000 [facility] [phone] [id number] Follow-up Instructions Return in about 4 weeks (around 7/25/2018).  
  
 7/9/2018  2:00 PM  
OB VISIT with Clementina Dahl DO 1000 [facility] ([address]) Appt Note: dating [address] 4392 [facility]  
[phone]  
  
   
 [address] [place] [address] Karo Cerna [address] Upcoming Health Maintenance Date Due  
 PAP AKA CERVICAL CYTOLOGY 5/21/2018 Influenza Age 5 to Adult 8/1/2018 DTaP/Tdap/Td series (2 - Td) 9/4/2025 Alergias  Review Complete El: 6/27/2018 Por: Diego Dawson LPN A partir del:  6/27/2018 No Known Allergies Vacunas actuales Revisadas el:  9/4/2015 Shimon Gilliama Influenza Vaccine (Quad) PF 10/2/2015 Influenza Vaccine Split 10/17/2012 Tdap 9/4/2015 No revisadas esta visita You Were Diagnosed With   
  
 Naman Lau Encounter for supervision of other normal pregnancy, unspecified trimester    -  Primary ICD-10-CM: Z34.80 ICD-9-CM: V22.1 Obesity (BMI 30-39. 9)     ICD-10-CM: E66.9 ICD-9-CM: 278.00 Partes vitales PS Pulso Temperatura Resp La Sal ( percentil de crecimiento) Peso (percentil de crecimiento) 97/64 80 99.2 °F (37.3 °C) (Oral) 18 5' 2\" (1.575 m) 199 lb (90.3 kg) LMP (última madi) SpO2 BMI (IMC) Estado obstétrico Estatus de tabaquísmo 04/13/2018 98% 36.4 kg/m2 Pregnant Never Smoker Historial de signos vitales BMI and BSA Data Body Mass Index Body Surface Area  
 36.4 kg/m 2 1.99 m 2 [facility] Name Phone [id number]- [place], [address] [facility] [phone] Solomon lista de medicamentos actualizada Anjali Zuleta actualizada 6/27/18 11:57 AM.  Chani Sorto use solomon lista de medicamentos más reciente. levonorgestrel 20 mcg/24 hr (5 years) Iud También conocido trey:  MIRENA  
1 Each by IntraUTERine route once. polyethylene glycol 17 gram packet También conocido trey:  Ferol Paresh Take 1 Packet by mouth daily as needed. prenatal vit no.112-folate no6 1 mg Chew Take 1 Tab by mouth daily. psyllium seed-sucrose Powd También conocido trey:  METAMUCIL (SUGAR) Take 1 teaspoon three times a day with meals. Hicimos lo siguiente AMB POC URINALYSIS DIP STICK AUTO W/O MICRO [23746 CPT(R)] ANTIBODY SCREEN [id number] CPT(R)] BLOOD TYPE, (ABO+RH) [97387 CPT(R)] CBC WITH AUTOMATED DIFF [71773 CPT(R)] CHLAMYDIA/GC PCR [14880 CPT(R)] CULTURE, URINE [id number] CPT(R)] GLUCOSE, 1 HR PP [08644 CPT(R)] HEMOGLOBIN A1C WITH EAG [45413 CPT(R)] HEP B SURFACE AG [id number] CPT(R)] HIV 1/2 AG/AB, 4TH GENERATION,W RFLX CONFIRM H7504797 CPT(R)] PAP IG, CT-NG, RFX APTIMA HPV ASCUS (035880, F0702000)) [[id number] Custom] RUBELLA AB, IGG [id number] CPT(R)] T PALLIDUM SCREEN W/REFLEX [[id number] Custom] Instrucciones de seguimiento Return in about 4 weeks (around 7/25/2018). Por hacer 06/27/2018 Lab:  CHLAMYDIA/GC PCR Instrucciones para el Paciente Semanas 10 a 14 de solomon embarazo: Después de la consulta [Weeks 10 to 14 of Your Pregnancy: After Your Visit] Instrucciones de cuidado Para las semanas 10 a 14 de solomon embarazo, la placenta se ha formado dentro del útero. Es posible oír los latidos del corazón de solomon bebé con un instrumento especial de ultrasonido. Los ojos de solomon bebé pueden moverse y lo Luceni. Los brazos y las piernas se pueden flexionar. Julia es un buen momento para pensar en las pruebas para detectar defectos congénitos. Existen dos tipos de pruebas: de detección y de diagnóstico. Las pruebas de detección muestran la posibilidad de que un bebé tenga un determinado defecto congénito. No pueden decirle con seguridad que garcía bebé tiene un problema. Las pruebas de diagnóstico muestran si un bebé tiene un determinado defecto congénito. Es garcía decisión si desea hacerse estas pruebas. Usted y garcía liudmila pueden hablar con garcía médico o partera sobre las pruebas de defectos congénitos. La atención de seguimiento es lazarus parte clave de garcía tratamiento y seguridad. Asegúrese de hacer y acudir a todas las citas, y llame a garcía médico si está teniendo problemas. También es lazarus buena idea saber los resultados de los exámenes y mantener lazarus lista de los medicamentos que terrie. Cómo puede cuidarse en el hogar? [place] Phill · Puede hacerse pruebas de detección y pruebas de diagnóstico para detectar defectos congénitos. La decisión de [employer] prueba para detectar defectos congénitos es personal. Considere garcía edad, garcía probabilidad de transmitir lazarus enfermedad hereditaria, garcía necesidad de saber acerca de cualquier problema y lo que podría hacer después de tener los [place] de la prueba. ¨ Análisis de chet triple o cuádruple. Estas pruebas de detección se pueden hacer entre las semanas 15 y 21 del [facility]. [address] cantidades de kelly o cuatro sustancias en la chet. El médico observa estos resultados de la prueba, junto con garcía edad y otros factores, para averiguar la probabilidad de que garcía bebé pueda tener ciertos problemas. ¨ Amniocentesis. Esta prueba de diagnóstico se utiliza para detectar problemas cromosómicos en las células del bebé.  Se puede hacer [employer] 15 y 21 del [facility], por lo general alrededor de la semana 16. 
¨ Prueba de translucencia nucal. Esta prueba [place] ultrasonido para medir el grosor de la nguyen de la nuca del bebé. Un aumento en el grosor puede ser lazarus señal temprana del síndrome de Down. Alivie la falta de comodidad · Cálmese y duerma siestas cuando se sienta cansada. · Si noris emociones varían, hable con alguien. El llanto, la ansiedad y los problemas de concentración son comunes. · Si le sangran las encías, pruebe usar un cepillo de dientes más [place]. Si tiene las [employer] o estas le sangran mucho, consulte con garcía dentista. · Si tiene mareos: 
¨ Levántese despacio después de estar sentada o acostada. ¨ Mary Anne abundantes líquidos. ¨ Coma pequeños refrigerios para mantener estable el azúcar en chet. ¨ Coloque la Edwar Nicolette noris piernas trey si estuviera atándose los cordones (agujetas). ¨ Acuéstese con las piernas más arriba que garcía gilberto. Use almohadas para apoyar los pies. · Si tiene dolor de gilberto: ¨ Acuéstese. ¨ Pídale a garcía liudmila o a un amigo que le natasha un masaje en el prasad. ¨ Colóquese paños fríos sobre la frente o en la nuca. ¨ Use acetaminofén (Tylenol) para aliviar el dolor. No tome antiinflamatorios no esteroideos (ANGELIKA), tales trey ibuprofeno (Advil, Motrin) o naproxeno (Aleve), a menos que garcía médico le diga que puede [place]. · Si le sangra la Barbarann Curia, apriétesela con suavidad y manténgala apretada por un rato. Para evitar sangrados nasales, pruebe hacerse masajes en las fosas nasales con lazarus cantidad pequeña de vaselina. · Si tiene la nariz congestionada, pruebe con un aerosol nasal salino (agua salada). No utilice aerosoles descongestionantes. Cuide noris senos · Use un sostén que le dé buen soporte. · Sepa que los cambios en los senos son normales. ¨ Noris senos pueden aumentar de Fliteyessenia y [employer]. El aumento de la sensibilidad suele mejorar a las 12 semanas. ¨ Noris pezones pueden oscurecerse y agrandarse, y es posible que las pequeñas protuberancias (abultamientos) alrededor de ellos scout más visibles. ¨ Las venas del pecho y de los senos podrían ser Esme Echevaria. · No se preocupe por endurecer los pezones. El amamantamiento hará esto naturalmente. Dónde puede encontrar más información en inglés? Leny Will a DealExplorer.be Man Gordon [id number] en la búsqueda para aprender más acerca de \"Semanas 10 a 14 de garcía embarazo: Después de la consulta. \"  
© 4501-5481 Healthwise, Incorporated. Instrucciones de cuidado adaptadas bajo licencia por [facility] [employer] (which disclaims liability or warranty for this information). Estas instrucciones de cuidado son para usarlas con garcía profesional clínico registrado. Si tiene preguntas acerca de lazarus afección médica o de estas instrucciones, pregunte siempre a garcía profesional de [employer]. Healthwise, Incorporated niega cualquier garantía o responsabilidad por garcía uso de esta información. Versión del contenido: [phone]; Última revisión: 12 julio, 2012 Introducing [facility]! Jeffery Hart introduce portal paciente MyChart . Ahora se puede acceder a partes de garcía expediente médico, enviar por correo electrónico la oficina de garcía médico y solicitar renovaciones de medicamentos en línea. En garcía navegador de Internet , Kezia Agustin a [email or web address] Juana cristian en el usuario por Flakito Ambrosio? Sadaf Bake cristian aquí en la sesión Coral Whitten. Verá la página de registro [place]. Ingrese garcía código de [employer] mary y trey aparece a continuación. Usted no tendrá que [employer] código después de jose f completado el proceso de registro . Si usted no se inscribe antes de la fecha de caducidad , debe solicitar un nuevo código. · MyChart Código de acceso : [id number] Expires: 9/11/2018  9:00 AM 
 
Ingresa los últimos cuatro dígitos de garcía Número de Seguro Social ( xxxx ) y fecha de nacimiento ( dd / mm / aaaa ) trey se indica y juana clic en Enviar. Usted será llevado a la siguiente página de registro . Crear un ID MyChart . Esta será garcía ID de inicio de sesión de MyChart y no puede ser [place] , por lo que pensar en lazarus que es Ijeoma Sanders y fácil de recordar . Crear lazarus contraseña MyChart . Usted puede cambiar garcía contraseña en cualquier momento . Ingrese garcía Password Reset de preguntas y Marte . [place] se puede utilizar en un momento posterior si usted olvida garcía contraseña. Introduzca garcía dirección de correo electrónico . Wu Wilfredo recibirá lazarus notificación por correo electrónico cuando la nueva información está disponible en MyChart . Ofelia Eleni clic en Registrarse. Vernona Curling anita y descargar porciones de garcía expediente médico. 
Julián clic en el enlace de descarga del menú Resumen para descargar lazarus copia portátil de garcía información médica . Si tiene [employer] , por favor visite la sección de preguntas frecuentes del sitio web MyChart . Recuerde, MyChart NO es que se utilizará para las necesidades urgentes. Para emergencias médicas , llame al 911 . Ahora disponible en garcía iPhone y Android ! Por favor proporcione scott resumen de la documentación de cuidado a garcía próximo proveedor. Your primary care clinician is listed as Epi Wray. If you have any questions after today's visit, please call [phone].

## 2018-06-27 NOTE — PATIENT INSTRUCTIONS
Semanas 10 a 14 de garcía embarazo: Después de la consulta  [Weeks 10 to 15 of Your Pregnancy: After Your Visit]  Instrucciones de cuidado  [employer] 10 a 14 de garcía embarazo, la placenta se ha formado dentro del útero. Es posible oír los latidos del corazón de garcía bebé con un instrumento especial de ultrasonido. Los ojos de garcía bebé pueden moverse y lo Luceni. Los brazos y las piernas se pueden flexionar. Julia es un buen momento para pensar en las pruebas para detectar defectos congénitos. Existen dos tipos de pruebas: de detección y de diagnóstico. Las pruebas de detección muestran la posibilidad de que un bebé tenga un determinado defecto congénito. No pueden decirle con seguridad que garcía bebé tiene un problema. Las pruebas de diagnóstico muestran si un bebé tiene un determinado defecto congénito. Es garcía decisión si desea hacerse estas pruebas. Usted y garcía liudmila pueden hablar con garcía médico o partera sobre las pruebas de defectos congénitos. La atención de seguimiento es lazarus parte clave de garcía tratamiento y seguridad. Asegúrese de hacer y acudir a todas las citas, y llame a garcía médico si está teniendo problemas. También es lazarus buena idea saber los resultados de los exámenes y mantener lazarus lista de los medicamentos que terrie. ¿Cómo puede cuidarse en el hogar? Decida sobre pruebas  · Puede hacerse pruebas de detección y pruebas de diagnóstico para detectar defectos congénitos. La decisión de [employer] prueba para detectar defectos congénitos es personal. Considere garcía edad, garcía probabilidad de transmitir lazarus enfermedad hereditaria, garcía necesidad de saber acerca de cualquier problema y lo que podría hacer después de tener los [place] de la prueba. ¨ Análisis de chet triple o cuádruple. Estas pruebas de detección se pueden hacer entre las semanas 15 y 21 del Gregory Mascorro. [address] cantidades de kelly o cuatro sustancias en la chet.  El médico observa estos resultados de la prueba, junto con garcía edad y [place], para averiguar la probabilidad de que garcía bebé pueda tener ciertos problemas. ¨ Amniocentesis. Esta prueba de diagnóstico se utiliza para detectar problemas cromosómicos en las células del bebé. Se puede hacer [employer] 15 y 21 del embarazo, por lo general alrededor de la semana 16.  ¨ Prueba de translucencia nucal. Esta prueba [place] ultrasonido para medir el grosor de la nguyen de la nuca del bebé. Un aumento en el grosor puede ser lazarus señal temprana del síndrome de Down. Alivie la falta de comodidad  · Cálmese y duerma siestas cuando se sienta cansada. · Si trent emociones varían, hable con alguien. El llanto, la ansiedad y los problemas de concentración son comunes. · Si le sangran las encías, pruebe usar un cepillo de dientes más [place]. Si tiene las [employer] o estas le sangran mucho, consulte con garcía dentista. · Si tiene mareos:  ¨ Levántese despacio después de estar sentada o acostada. ¨ Mary Anne abundantes líquidos. ¨ Coma pequeños refrigerios para mantener estable el azúcar en chet. ¨ Coloque la Edwar Nicolette trent piernas trey si estuviera atándose los cordones (agujetas). ¨ Acuéstese con las piernas más arriba que garcía gilberto. Use almohadas para apoyar los pies. · Si tiene dolor de gilberto:  ¨ Acuéstese. ¨ Pídale a garcía liudmila o a un amigo que le natasha un masaje en el prasad. ¨ Colóquese paños fríos sobre la frente o en la nuca. ¨ Use acetaminofén (Tylenol) para aliviar el dolor. No tome antiinflamatorios no esteroideos (ANGELIKA), tales trey ibuprofeno (Advil, Motrin) o naproxeno (Aleve), a menos que garcía médico le diga que puede [place]. · Si le sangra la Brynda Breeding, apriétesela con suavidad y manténgala apretada por un rato. Para evitar sangrados nasales, pruebe hacerse masajes en las fosas nasales con lazarus cantidad pequeña de vaselina. · Si tiene la nariz congestionada, pruebe con un aerosol nasal salino (agua salada). No utilice aerosoles descongestionantes.   Cuide trent senos  · Use un sostén que le dé buen soporte. · Sepa que los cambios en los senos son normales. ¨ Noris senos pueden aumentar de [employer] y [employer]. El aumento de la sensibilidad suele mejorar a las 12 semanas. ¨ Noris pezones pueden oscurecerse y agrandarse, y es posible que las pequeñas protuberancias (abultamientos) alrededor de ellos scout más visibles. ¨ Las venas del pecho y de los senos podrían ser [place]. · No se preocupe por endurecer los pezones. El amamantamiento hará esto naturalmente. ¿Dónde puede encontrar más información en inglés? Leda Alcantara a DealExplorer.be  Silverio Maguire [id number] en la búsqueda para aprender más acerca de \"Semanas 10 a 14 de garcía embarazo: Después de la consulta. \"   © 5920-6439 Healthwise, Incorporated. Instrucciones de cuidado adaptadas bajo licencia por Lyla Perez (which disclaims liability or warranty for this information). Estas instrucciones de cuidado son para usarlas con garcía profesional clínico registrado. Si tiene preguntas acerca de lazarus afección médica o de estas instrucciones, pregunte siempre a garcía profesional de [employer]. Healthwise, Incorporated niega cualquier garantía o responsabilidad por garcía uso de esta información.   Versión del contenido: [phone]; Última revisión: 12 julio, 2012

## 2018-06-29 ENCOUNTER — TELEPHONE (OUTPATIENT)
Dept: FAMILY MEDICINE CLINIC | Age: 28
End: 2018-06-29

## 2018-06-29 LAB
ABO GROUP BLD: NORMAL
BACTERIA UR CULT: ABNORMAL
BACTERIA UR CULT: ABNORMAL
BASOPHILS # BLD AUTO: 0 X10E3/UL (ref 0–0.2)
BASOPHILS NFR BLD AUTO: 0 %
BLD GP AB SCN SERPL QL: NEGATIVE
C TRACH RRNA SPEC QL NAA+PROBE: NEGATIVE
C TRACH RRNA SPEC QL NAA+PROBE: NEGATIVE
EOSINOPHIL # BLD AUTO: 0.1 X10E3/UL (ref 0–0.4)
EOSINOPHIL NFR BLD AUTO: 1 %
ERYTHROCYTE [DISTWIDTH] IN BLOOD BY AUTOMATED COUNT: 14.6 % (ref 12.3–15.4)
EST. AVERAGE GLUCOSE BLD GHB EST-MCNC: 105 MG/DL
GLUCOSE 1H P MEAL SERPL-MCNC: 173 MG/DL (ref 65–199)
HBA1C MFR BLD: 5.3 % (ref 4.8–5.6)
HBV SURFACE AG SERPL QL IA: NEGATIVE
HCT VFR BLD AUTO: 41.3 % (ref 34–46.6)
HGB BLD-MCNC: 13.4 G/DL (ref 11.1–15.9)
HIV 1+2 AB+HIV1 P24 AG SERPL QL IA: NON REACTIVE
IMM GRANULOCYTES # BLD: 0 X10E3/UL (ref 0–0.1)
IMM GRANULOCYTES NFR BLD: 0 %
LYMPHOCYTES # BLD AUTO: 1.7 X10E3/UL (ref 0.7–3.1)
LYMPHOCYTES NFR BLD AUTO: 17 %
MCH RBC QN AUTO: 29.7 PG (ref 26.6–33)
MCHC RBC AUTO-ENTMCNC: 32.4 G/DL (ref 31.5–35.7)
MCV RBC AUTO: 92 FL (ref 79–97)
MONOCYTES # BLD AUTO: 0.5 X10E3/UL (ref 0.1–0.9)
MONOCYTES NFR BLD AUTO: 4 %
N GONORRHOEA RRNA SPEC QL NAA+PROBE: NEGATIVE
N GONORRHOEA RRNA SPEC QL NAA+PROBE: NEGATIVE
NEUTROPHILS # BLD AUTO: 8 X10E3/UL (ref 1.4–7)
NEUTROPHILS NFR BLD AUTO: 78 %
PLATELET # BLD AUTO: 215 X10E3/UL (ref 150–379)
RBC # BLD AUTO: 4.51 X10E6/UL (ref 3.77–5.28)
RH BLD: POSITIVE
RUBV IGG SERPL IA-ACNC: 9.35 INDEX
SPECIMEN SOURCE: NORMAL
T PALLIDUM AB SER QL IA: NEGATIVE
WBC # BLD AUTO: 10.2 X10E3/UL (ref 3.4–10.8)

## 2018-06-29 NOTE — PROGRESS NOTES
Prenatal labs: CBC WNL, O+, Antibody negative, Rubella immune, Hep B negative, T pall negative, HIV NR, A1C WNL, 1hr OGTT - WNL, GC/chlamydia negative

## 2018-06-29 NOTE — TELEPHONE ENCOUNTER
----- Message from Jann Faulkner sent at 6/29/2018  2:22 PM EDT -----  Regarding: DR Dahl/Phone  Pt's sister called, Nichole Ramey, and states that pt needs RX for nausea [facility] on [address]. Can you let her know if this can be done. She thinks the dr forgot to call it in the other day. Her number is [phone].

## 2018-06-30 RX ORDER — AMOXICILLIN 500 MG/1
500 CAPSULE ORAL 2 TIMES DAILY
Qty: 14 CAP | Refills: 0 | Status: SHIPPED | OUTPATIENT
Start: 2018-06-30 | End: 2018-07-07

## 2018-06-30 RX ORDER — PYRIDOXINE HCL (VITAMIN B6) 25 MG
25 TABLET ORAL
Qty: 90 TAB | Refills: 1 | Status: SHIPPED | OUTPATIENT
Start: 2018-06-30 | End: 2019-01-19

## 2018-06-30 NOTE — TELEPHONE ENCOUNTER
Amoxicillin rx sent to pharmacy for asymptomatic bacteruria based on culture and sensitivity. Called patient using language line. Patient did not . Left voice mail explaining UTI seen on urine culture and to start taking antibiotics two times daily for 7 days. Please call the office if you have any questions.

## 2018-06-30 NOTE — TELEPHONE ENCOUNTER
Rx for Vitamin B6 sent to pharmacy. Language line was used. Called patient but patient did not . LVM to  vitamin B6 from pharmacy.

## 2018-06-30 NOTE — PROGRESS NOTES
I reviewed with the resident the medical history and the resident's findings on the physical examination. I discussed with the resident the patient's diagnosis and concur with the plan. 27yo  @ .5   1. IUP: IOB labs today with pap, dating sono scheduled , anatomy requested, desires AFP tetra   2. Hx GDM: will get early GTT this visit   3. Hx ICP   4.   Obesity: would benefit from growth scan in 3rd trimester

## 2018-07-09 ENCOUNTER — ROUTINE PRENATAL (OUTPATIENT)
Dept: FAMILY MEDICINE CLINIC | Age: 28
End: 2018-07-09

## 2018-07-09 VITALS
DIASTOLIC BLOOD PRESSURE: 72 MMHG | HEART RATE: 78 BPM | SYSTOLIC BLOOD PRESSURE: 116 MMHG | HEIGHT: 62 IN | OXYGEN SATURATION: 98 % | BODY MASS INDEX: 36.25 KG/M2 | TEMPERATURE: 98.1 F | WEIGHT: 197 LBS | RESPIRATION RATE: 16 BRPM

## 2018-07-09 DIAGNOSIS — Z3A.12 12 WEEKS GESTATION OF PREGNANCY: ICD-10-CM

## 2018-07-09 LAB
BILIRUB UR QL STRIP: NEGATIVE
GLUCOSE UR-MCNC: NEGATIVE MG/DL
KETONES P FAST UR STRIP-MCNC: NORMAL MG/DL
PH UR STRIP: 7.5 [PH] (ref 4.6–8)
PROT UR QL STRIP: NEGATIVE
SP GR UR STRIP: 1.02 (ref 1–1.03)
UA UROBILINOGEN AMB POC: NORMAL (ref 0.2–1)
URINALYSIS CLARITY POC: CLEAR
URINALYSIS COLOR POC: YELLOW
URINE BLOOD POC: NEGATIVE
URINE LEUKOCYTES POC: NEGATIVE
URINE NITRITES POC: NEGATIVE

## 2018-07-09 NOTE — PROGRESS NOTES
Patient here for dating ultrasound. Single IUP present, yolk sac appreciated  Appearance of dividing membrane with second sac, no IUP in additional gestational sac   Unable to determine placental location due to early gestation.   FHT present at 170bpm.    12wk 3 days by LMP  8wk 5days by AUA    ALEXX by LMP 1/18/19  ALEXX (AUA) 2/13/19    Will reassign ALEXX to 2/13/19 based on dating sono  Pt failed early one hour GTT, needs fasting 3 hour that she will return for

## 2018-07-09 NOTE — PROGRESS NOTES
Patient denies any leakage of fluid or abnormal vaginal bleeding. Patient states she is having fetal movement, and is not having any pain. Patient states she is still taking prenatal vitamins.

## 2018-07-16 ENCOUNTER — LAB ONLY (OUTPATIENT)
Dept: FAMILY MEDICINE CLINIC | Age: 28
End: 2018-07-16

## 2018-07-17 LAB
GLUCOSE 1H P 100 G GLC PO SERPL-MCNC: 146 MG/DL (ref 65–179)
GLUCOSE 2H P 100 G GLC PO SERPL-MCNC: 117 MG/DL (ref 65–154)
GLUCOSE 3H P 100 G GLC PO SERPL-MCNC: 62 MG/DL (ref 65–139)
GLUCOSE P FAST SERPL-MCNC: 88 MG/DL (ref 65–94)
NOTE:, 102047: ABNORMAL

## 2018-08-02 ENCOUNTER — ROUTINE PRENATAL (OUTPATIENT)
Dept: FAMILY MEDICINE CLINIC | Age: 28
End: 2018-08-02

## 2018-08-02 VITALS
BODY MASS INDEX: 36.25 KG/M2 | TEMPERATURE: 98.3 F | RESPIRATION RATE: 18 BRPM | OXYGEN SATURATION: 100 % | HEIGHT: 62 IN | DIASTOLIC BLOOD PRESSURE: 72 MMHG | SYSTOLIC BLOOD PRESSURE: 117 MMHG | WEIGHT: 197 LBS | HEART RATE: 80 BPM

## 2018-08-02 DIAGNOSIS — Z3A.12 12 WEEKS GESTATION OF PREGNANCY: Primary | ICD-10-CM

## 2018-08-02 DIAGNOSIS — E66.9 CLASS 2 OBESITY WITH BODY MASS INDEX (BMI) OF 36.0 TO 36.9 IN ADULT, UNSPECIFIED OBESITY TYPE, UNSPECIFIED WHETHER SERIOUS COMORBIDITY PRESENT: ICD-10-CM

## 2018-08-02 LAB
BILIRUB UR QL STRIP: NEGATIVE
GLUCOSE UR-MCNC: NEGATIVE MG/DL
KETONES P FAST UR STRIP-MCNC: NORMAL MG/DL
PH UR STRIP: 6.5 [PH] (ref 4.6–8)
PROT UR QL STRIP: NEGATIVE
SP GR UR STRIP: 1.02 (ref 1–1.03)
UA UROBILINOGEN AMB POC: NORMAL (ref 0.2–1)
URINALYSIS CLARITY POC: CLEAR
URINALYSIS COLOR POC: YELLOW
URINE BLOOD POC: NEGATIVE
URINE LEUKOCYTES POC: NEGATIVE
URINE NITRITES POC: NEGATIVE

## 2018-08-02 NOTE — PROGRESS NOTES
Return OB Visit       Subjective:   Jcarlos Benitez 33 y.o.  at 12w3d  ALEXX: 2019, by dating Ultrasound     Iraqi interpretor used. States she does not have vaginal bleeding  and vaginal leaking of fluid . + fetal movement. She is taking PNV and she is eating healthy. Reports soarness in the lower abodmen, mostly after sitting. Patient denies severe abdominal pain. Allergies- reviewed:   No Known Allergies      Medications- reviewed:   Current Outpatient Prescriptions   Medication Sig    pyridoxine, vitamin B6, (VITAMIN B-6) 25 mg tablet Take 1 Tab by mouth three (3) times daily as needed.  prenatal vit no.112-folate no6 1 mg chew Take 1 Tab by mouth daily.  psyllium seed-sucrose (METAMUCIL, SUGAR,) powd Take 1 teaspoon three times a day with meals.  polyethylene glycol (MIRALAX) 17 gram packet Take 1 Packet by mouth daily as needed.  levonorgestrel (MIRENA) 20 mcg/24 hr (5 years) IUD 1 Each by IntraUTERine route once. No current facility-administered medications for this visit. Past Medical History- reviewed:  Past Medical History:   Diagnosis Date    Cholestasis of pregnancy in third trimester 2015    Constipation     Gestational diabetes 2015    Hypertension     possibly per pt report in [place]. Denied it 5/5/15         Past Surgical History- reviewed:   No past surgical history on file. Social History- reviewed:  Social History     Social History    Marital status: SINGLE     Spouse name: N/A    Number of children: N/A    Years of education: N/A     Occupational History    Not on file.      Social History Main Topics    Smoking status: Never Smoker    Smokeless tobacco: Never Used    Alcohol use No    Drug use: No    Sexual activity: Yes     Partners: Male     Birth control/ protection: None, Condom     Other Topics Concern    Not on file     Social History Narrative    ** Merged History Encounter **         ** Merged History Encounter **            Immunizations- reviewed:   Immunization History   Administered Date(s) Administered    Influenza Vaccine (Quad) PF 10/02/2015    Influenza Vaccine Split 10/17/2012    Tdap 2015             Objective:     Visit Vitals    /72    Pulse 80    Temp 98.3 °F (36.8 °C) (Oral)    Resp 18    Ht 5' 2\" (1.575 m)    Wt 197 lb (89.4 kg)    LMP 2018    SpO2 100%    BMI 36.03 kg/m2       Physical Exam:  GENERAL APPEARANCE: alert, well appearing, in no apparent distress  LUNGS: clear to auscultation, no wheezes, rales or rhonchi, symmetric air entry  HEART: regular rate and rhythm, no murmurs  ABDOMEN: soft, nontender, nondistended, no abnormal masses, no epigastric pain, bowel sounds present, fundal FHT present at 150s bpm      Labs    Recent Results (from the past 12 hour(s))   AMB POC URINALYSIS DIP STICK AUTO W/O MICRO    Collection Time: 18  1:09 PM   Result Value Ref Range    Color (UA POC) Yellow     Clarity (UA POC) Clear     Glucose (UA POC) Negative Negative    Bilirubin (UA POC) Negative Negative    Ketones (UA POC) Trace Negative    Specific gravity (UA POC) 1.025 1.001 - 1.035    Blood (UA POC) Negative Negative    pH (UA POC) 6.5 4.6 - 8.0    Protein (UA POC) Negative Negative    Urobilinogen (UA POC) 0.2 mg/dL 0.2 - 1    Nitrites (UA POC) Negative Negative    Leukocyte esterase (UA POC) Negative Negative         Assessment   Pregnancy at  12w1d       ICD-10-CM ICD-9-CM    1. 12 weeks gestation of pregnancy Z3A.12 V22.2 AMB POC URINALYSIS DIP STICK AUTO W/O MICRO   2. Class 2 obesity with body mass index (BMI) of 36.0 to 36.9 in adult, unspecified obesity type, unspecified whether serious comorbidity present E66.9 278.00     Z68.36 V85.36          Plan   32 y/o  at 12w1d by ultrasound:     Prenatal labs: , O+, Antibody negative, Rubella immune, Hep B negative, T pall negative, HIV NR, A1C WNL, failed 1hr GTT , passed 3 hr GTT, GC/chlamydia negative  · UA negative for protein, glucose, nitrites or LE  · Failed 1 hr GTT but passed 3hr GTT  · Continue routine prenatal care  · Tylenol PRN for  abdominal soarness  · Follow up in 4 weeks for return OB visit  · Will obtain AFP tetra screen at next visit as well as fax paper to [facility] for fetal anatomy scan      Obesity: BMI 36.03  - Lost 2lbs since initial visit  - Total weight gain 11-20 lbs in this pregnancy  -  Recommended to not try to lose weight, mild to moderate exercise 3 times a week, please avoid activities that can cause abdominal trauma      Orders Placed This Encounter    AMB POC URINALYSIS DIP STICK AUTO W/O MICRO         Labor precautions discussed, including: Regular painful contractions, lasting for greater than one hour, taking your breath away; any vaginal bleeding; any leakage of fluid; or absent or decreased fetal movement. Call M.D. on call if any of these symptoms or signs occur. I have discussed the diagnosis with the patient and the intended plan as seen in the above orders. The patient has received an after-visit summary and questions were answered concerning future plans. I have discussed medication side effects and warnings with the patient as well. Informed pt to return to the office or go to the ER if she experiences vaginal bleeding, vaginal discharge, leaking of fluid, pelvic cramping.       Pt discussed with Dr. Marilyn Tineo (attending physician)    Randy Guadalupe MD  Family Medicine Resident

## 2018-08-02 NOTE — MR AVS SNAPSHOT
[address] 
[phone] Patient: Martinez Paez MRN: [id number] Visit Information Brenton Viera Personal Médico Departamento Teléfono del Dep. Número de visita 8/2/2018  1:00 PM Saroj Rodas MD 1000 [facility] [phone] Follow-up Instructions Return in about 4 weeks (around 8/30/2018). 8/30/2018 10:00 AM  
OB VISIT with Saroj Rodas MD  
1000 [facility]) Appt Note: 16 weeks [address]  
[phone]  
  
   
 [address] [place] [address] [phone] Upcoming Health Maintenance Date Due Influenza Age 5 to Adult 8/1/2018 PAP AKA CERVICAL CYTOLOGY 6/27/2021 DTaP/Tdap/Td series (2 - Td) 9/4/2025 Alergias  Review Complete El: 8/2/2018 Por: Ashley Pedro LPN A partir del:  8/2/2018 No Known Allergies Vacunas actuales Revisadas el:  9/4/2015 Richa Dimple Influenza Vaccine (Quad) PF 10/2/2015 Influenza Vaccine Split 10/17/2012 Tdap 9/4/2015 No revisadas esta visita You Were Diagnosed With   
  
 Celester Shone 12 weeks gestation of pregnancy    -  Primary ICD-10-CM: Z3A.12 
ICD-9-CM: V22.2 Class 2 obesity with body mass index (BMI) of 36.0 to 36.9 in adult, unspecified obesity type, unspecified whether serious comorbidity present     ICD-10-CM: E66.9, Z68.36 
ICD-9-CM: 278.00, V85.36 Partes vitales PS Pulso Temperatura Resp [place] ( percentil de crecimiento) Peso (percentil de crecimiento)  
 117/72 80 98.3 °F (36.8 °C) (Oral) 18 5' 2\" (1.575 m) 197 lb (89.4 kg) LMP (última madi) SpO2 BMI (IMC) Estado obstétrico Estatus de tabuísmo 04/13/2018 100% 36.03 kg/m2 Pregnant Never Smoker BMI and BSA Data Body Mass Index Body Surface Area  36.03 kg/m 2 1.98 m 2  
  
  
 [facility] Name Phone [facility]ACY #[id number] Luis Keenan, [facility] [phone] Solomon lista de medicamentos actualizada Lista actualizada 8/2/18  1:35 PM.  Uzair Child use solomon lista de medicamentos más reciente. levonorgestrel 20 mcg/24 hr (6 years) Iud También conocido trey:  MIRENA  
1 Each by IntraUTERine route once. polyethylene glycol 17 gram packet También conocido trey:  Alonza Schimke Take 1 Packet by mouth daily as needed. prenatal vit no.112-folate no6 1 mg Chew Take 1 Tab by mouth daily. psyllium seed-sucrose Powd También conocido trey:  METAMUCIL (SUGAR) Take 1 teaspoon three times a day with meals. pyridoxine (vitamin B6) 25 mg tablet También conocido trey:  VITAMIN B-6 Take 1 Tab by mouth three (3) times daily as needed. Hicimos lo siguiente AMB POC URINALYSIS DIP STICK AUTO W/O MICRO [62570 CPT(R)] Instrucciones de seguimiento Return in about 4 weeks (around 8/30/2018). Instrucciones para el Paciente Semanas 10 a 14 de solomon embarazo: Después de la consulta [Weeks 10 to 14 of Your Pregnancy: After Your Visit] Instrucciones de cuidado Para las semanas 10 a 14 de solomon embarazo, la placenta se ha formado dentro del útero. Es posible oír los latidos del corazón de solomon bebé con un instrumento especial de ultrasonido. Los ojos de solomon bebé pueden moverse y lo Luceni. Los brazos y las piernas se pueden flexionar. Julia es un buen momento para pensar en las pruebas para detectar defectos congénitos. Existen dos tipos de pruebas: de detección y de diagnóstico. Las pruebas de detección muestran la posibilidad de que un bebé tenga un determinado defecto congénito. No pueden decirle con seguridad que solomon bebé tiene un problema. Las pruebas de diagnóstico muestran si un bebé tiene un determinado defecto congénito. Es garcía decisión si desea hacerse estas pruebas. Usted y garcía liudmila pueden hablar con garcía médico o partera sobre las pruebas de defectos congénitos. La atención de seguimiento es lazarus parte clave de garcía tratamiento y seguridad. Asegúrese de hacer y acudir a todas las citas, y llame a garcía médico si está teniendo problemas. También es lazarus buena idea saber los resultados de los exámenes y mantener lazarus lista de los medicamentos que terrie. Cómo puede cuidarse en el hogar? Edith [place] · Puede hacerse pruebas de detección y pruebas de diagnóstico para detectar defectos congénitos. La decisión de [employer] prueba para detectar defectos congénitos es personal. Considere garcía edad, garcía probabilidad de transmitir lazarus enfermedad hereditaria, garcía necesidad de saber acerca de cualquier problema y lo que podría hacer después de tener los [place] de la prueba. ¨ Análisis de chet triple o cuádruple. Estas pruebas de detección se pueden hacer entre las semanas 15 y 21 del [facility]. [address] cantidades de kelly o cuatro sustancias en la chet. El médico observa estos resultados de la prueba, junto con garcía edad y otros factores, para averiguar la probabilidad de que garcía bebé pueda tener ciertos problemas. ¨ Amniocentesis. Esta prueba de diagnóstico se utiliza para detectar problemas cromosómicos en las células del bebé. Se puede hacer [employer] 15 y 21 del embarazo, por lo general alrededor de la semana 16. 
¨ Prueba de translucencia nucal. Esta prueba [place] ultrasonido para medir el grosor de la nguyen de la nuca del bebé. Un aumento en el grosor puede ser lazarus señal temprana del síndrome de Down. Alivie la falta de comodidad · Cálmese y duerma siestas cuando se sienta cansada. · Si trent emociones varían, hable con alguien. El llanto, la ansiedad y los problemas de concentración son comunes. · Si le sangran las encías, pruebe usar un cepillo de dientes más [place]. Si tiene las [employer] o estas le sangran mucho, consulte con garcía dentista. · Si tiene mareos: 
¨ Levántese despacio después de estar sentada o acostada. ¨ Mary Anne abundantes líquidos. ¨ Coma pequeños refrigerios para mantener estable el azúcar en chet. ¨ Coloque la Edwar Nicolette noris piernas trey si estuviera atándose los cordones (agujetas). ¨ Acuéstese con las piernas más arriba que garcía gilberto. Use almohadas para apoyar los pies. · Si tiene dolor de gilberto: ¨ Acuéstese. ¨ Pídale a garcía liudmila o a un amigo que le natasha un masaje en el prasad. ¨ Colóquese paños fríos sobre la frente o en la nuca. ¨ Use acetaminofén (Tylenol) para aliviar el dolor. No tome antiinflamatorios no esteroideos (ANGELIKA), tales trey ibuprofeno (Advil, Motrin) o naproxeno (Aleve), a menos que garcía médico le diga que puede [place]. · Si le sangra la Jeny Borne, apriétesela con suavidad y manténgala apretada por un rato. Para evitar sangrados nasales, pruebe hacerse masajes en las fosas nasales con lazarus cantidad pequeña de vaselina. · Si tiene la nariz congestionada, pruebe con un aerosol nasal salino (agua salada). No utilice aerosoles descongestionantes. Cuide noris senos · Use un sostén que le dé buen soporte. · Sepa que los cambios en los senos son normales. ¨ Noris senos pueden aumentar de shirley y [employer]. El aumento de la sensibilidad suele mejorar a las 12 semanas. ¨ Noris pezones pueden oscurecerse y agrandarse, y es posible que las pequeñas protuberancias (abultamientos) alrededor de ellos scout más visibles. ¨ Las venas del pecho y de los senos podrían ser [place] . · No se preocupe por endurecer los pezones. El amamantamiento hará esto naturalmente. Dónde puede encontrar más información en inglés? Sofi Nam a [email or web address].be Yue Shan U407 en la búsqueda para aprender más acerca de \"Semanas 10 a 14 de garcía embarazo: Después de la consulta. \"  
 © 1212-8073 Healthwise, Incorporated. Instrucciones de cuidado adaptadas bajo licencia por Elsy Rapp (which disclaims liability or warranty for this information). Estas instrucciones de cuidado son para usarlas con garcía profesional clínico registrado. Si tiene preguntas acerca de lazarus afección médica o de estas instrucciones, pregunte siempre a garcía profesional de [employer]. Healthwise, Incorporated niega cualquier garantía o responsabilidad por garcía uso de esta información. Versión del contenido: [id number]; Última revisión: 12 julio, 2012 Introducing [facility]! Jeffery Hart introduce portal paciente MyChart . Ahora se puede acceder a partes de garcía expediente médico, enviar por correo electrónico la oficina de garcía médico y solicitar renovaciones de medicamentos en línea. En garcía navegador de Internet , Ema Dyer a [email or web address] Juana clic en el usuario por Corenl Robledo? Susan Gasmen clic aquí en la sesión Clarkejared Smith. Verá la página de registro Cedar. Ingrese garcía código de [facility] mary y trey aparece a continuación. Usted no tendrá que [employer] código después de jose f completado el proceso de registro . Si usted no se inscribe antes de la fecha de caducidad , debe solicitar un nuevo código. · MyChart Código de acceso : [id number] Expires: 9/11/2018  9:00 AM 
 
Ingresa los últimos cuatro dígitos de garcía Número de Seguro Social ( xxxx ) y fecha de nacimiento ( dd / mm / aaaa ) trey se indica y juana clic en Enviar. Usted será llevado a la siguiente página de registro . Crear un ID MyChart . Esta será garcía ID de inicio de sesión de MyChart y no puede ser [place] , por lo que pensar en lazarus que es [place] y fácil de recordar . Crear lazarus contraseña MyChart . Usted puede cambiar garcía contraseña en cualquier momento . Ingrese garcía Password Reset de preguntas y Marte . [place] se puede utilizar en un momento posterior si usted olvida garcía contraseña. Introduzca garcía dirección de correo electrónico . Melina Estimable recibirá lazarus notificación por correo electrónico cuando la nueva información está disponible en MyChart . Emma foster en Registrarse. Glory Conn anita y descargar porciones de garcía expediente médico. 
Julián cristian en el enlace de descarga del menú Resumen para descargar lazarus copia portátil de garcía información médica . Si tiene [employer] , por favor visite la sección de preguntas frecuentes del sitio web [employer]Shark . Recuerde, YaKlasst NO es que se utilizará para las necesidades urgentes. Para emergencias médicas , llame al 911 . Ahora disponible en garcía iPhone y Android ! Por favor proporcione scott resumen de la documentación de cuidado a garcía próximo proveedor. Your primary care clinician is listed as Nguyen Salinas. If you have any questions after today's visit, please call [phone].

## 2018-08-02 NOTE — PROGRESS NOTES
Identified Patient with two Patient identifiers (Name and ). Two Patient Identifiers confirmed. Reviewed record in preparation for visit and have obtained necessary documentation. Chief Complaint   Patient presents with    Routine Prenatal Visit      12w1d     Patient denies any abnormal discharge, bleeding, or leaking of fluid. Patient states that she is feeling the baby move occasionally. Patient is complaining of lower abdominal pain x1 week. Visit Vitals    /72    Pulse 80    Temp 98.3 °F (36.8 °C) (Oral)    Resp 18    Ht 5' 2\" (1.575 m)    Wt 197 lb (89.4 kg)    SpO2 100%    BMI 36.03 kg/m2       1. Have you been to the ER, urgent care clinic since your last visit? Hospitalized since your last visit? No    2. Have you seen or consulted any other health care providers outside of the [address] [place] [address] since your last visit? Include any pap smears or colon screening.  No

## 2018-08-02 NOTE — PATIENT INSTRUCTIONS
Semanas 10 a 14 de garcía embarazo: Después de la consulta  [Weeks 10 to 15 of Your Pregnancy: After Your Visit]  Instrucciones de cuidado  [employer] 10 a 14 de garcía embarazo, la placenta se ha formado dentro del útero. Es posible oír los latidos del corazón de garcía bebé con un instrumento especial de ultrasonido. Los ojos de garcía bebé pueden moverse y lo Luceni. Los brazos y las piernas se pueden flexionar. Julia es un buen momento para pensar en las pruebas para detectar defectos congénitos. Existen dos tipos de pruebas: de detección y de diagnóstico. Las pruebas de detección muestran la posibilidad de que un bebé tenga un determinado defecto congénito. No pueden decirle con seguridad que garcía bebé tiene un problema. Las pruebas de diagnóstico muestran si un bebé tiene un determinado defecto congénito. Es garcía decisión si desea hacerse estas pruebas. Usted y garcía liudmila pueden hablar con garcía médico o partera sobre las pruebas de defectos congénitos. La atención de seguimiento es lazarus parte clave de garcía tratamiento y seguridad. Asegúrese de hacer y acudir a todas las citas, y llame a garcía médico si está teniendo problemas. También es lazarus buena idea saber los resultados de los exámenes y mantener lazarus lista de los medicamentos que terrie. ¿Cómo puede cuidarse en el hogar? Decida sobre pruebas  · Puede hacerse pruebas de detección y pruebas de diagnóstico para detectar defectos congénitos. La decisión de [employer] prueba para detectar defectos congénitos es personal. Considere garcía edad, garcía probabilidad de transmitir lazarus enfermedad hereditaria, garcía necesidad de saber acerca de cualquier problema y lo que podría hacer después de tener los [place] de la prueba. ¨ Análisis de chet triple o cuádruple. Estas pruebas de detección se pueden hacer entre las semanas 15 y 21 del [facility]. [address] cantidades de kelly o cuatro sustancias en la chet.  El médico observa estos resultados de la prueba, junto con garcía edad y [place], para averiguar la probabilidad de que garcía bebé pueda tener ciertos problemas. ¨ Amniocentesis. Esta prueba de diagnóstico se utiliza para detectar problemas cromosómicos en las células del bebé. Se puede hacer [employer] 15 y 21 del embarazo, por lo general alrededor de la semana 16.  ¨ Prueba de translucencia nucal. Esta prueba [place] ultrasonido para medir el grosor de la nguyen de la nuca del bebé. Un aumento en el grosor puede ser lazarus señal temprana del síndrome de Down. Alivie la falta de comodidad  · Cálmese y duerma siestas cuando se sienta cansada. · Si trent emociones varían, hable con alguien. El llanto, la ansiedad y los problemas de concentración son comunes. · Si le sangran las encías, pruebe usar un cepillo de dientes más [place]. Si tiene las [employer] o estas le sangran mucho, consulte con garcía dentista. · Si tiene mareos:  ¨ Levántese despacio después de estar sentada o acostada. ¨ Mary Anne abundantes líquidos. ¨ Coma pequeños refrigerios para mantener estable el azúcar en chet. ¨ Coloque la Edwar Nicolette trent piernas trey si estuviera atándose los cordones (agujetas). ¨ Acuéstese con las piernas más arriba que garcía gilberto. Use almohadas para apoyar los pies. · Si tiene dolor de gilberto:  ¨ Acuéstese. ¨ Pídale a garcía liudmila o a un amigo que le natasha un masaje en el prasad. ¨ Colóquese paños fríos sobre la frente o en la nuca. ¨ Use acetaminofén (Tylenol) para aliviar el dolor. No tome antiinflamatorios no esteroideos (ANGELIKA), tales trey ibuprofeno (Advil, Motrin) o naproxeno (Aleve), a menos que garcía médico le diga que puede [place]. · Si le sangra la Andi Stroud, apriétesela con suavidad y manténgala apretada por un rato. Para evitar sangrados nasales, pruebe hacerse masajes en las fosas nasales con lazarus cantidad pequeña de vaselina. · Si tiene la nariz congestionada, pruebe con un aerosol nasal salino (agua salada). No utilice aerosoles descongestionantes.   Cuide trent senos  · Use un sostén que le dé buen soporte. · Sepa que los cambios en los senos son normales. ¨ Noris senos pueden aumentar de [employer] y [employer]. El aumento de la sensibilidad suele mejorar a las 12 semanas. ¨ Noris pezones pueden oscurecerse y agrandarse, y es posible que las pequeñas protuberancias (abultamientos) alrededor de ellos scout más visibles. ¨ Las venas del pecho y de los senos podrían ser Jenney Locus. · No se preocupe por endurecer los pezones. El amamantamiento hará esto naturalmente. ¿Dónde puede encontrar más información en inglés? Dominique Merck a [email or web address].be  Marisa Kaminski D345 en la búsqueda para aprender más acerca de \"Semanas 10 a 14 de garcía embarazo: Después de la consulta. \"   © 0701-2110 Healthwise, Incorporated. Instrucciones de cuidado adaptadas bajo licencia por Eve Dai (which disclaims liability or warranty for this information). Estas instrucciones de cuidado son para usarlas con garcía profesional clínico registrado. Si tiene preguntas acerca de lazarus afección médica o de estas instrucciones, pregunte siempre a garcía profesional de [employer]. Healthwise, Incorporated niega cualquier garantía o responsabilidad por garcía uso de esta información.   Versión del contenido: [id number]; Última revisión: 12 julio, 2012

## 2018-08-24 ENCOUNTER — HOSPITAL ENCOUNTER (EMERGENCY)
Age: 28
Discharge: HOME | End: 2018-08-24
Attending: EMERGENCY MEDICINE
Payer: SUBSIDIZED

## 2018-08-24 ENCOUNTER — APPOINTMENT (OUTPATIENT)
Dept: ULTRASOUND IMAGING | Age: 28
End: 2018-08-24
Attending: EMERGENCY MEDICINE
Payer: SUBSIDIZED

## 2018-08-24 VITALS
TEMPERATURE: 98.8 F | HEART RATE: 78 BPM | OXYGEN SATURATION: 98 % | DIASTOLIC BLOOD PRESSURE: 62 MMHG | RESPIRATION RATE: 17 BRPM | BODY MASS INDEX: 36.99 KG/M2 | HEIGHT: 62 IN | WEIGHT: 201 LBS | SYSTOLIC BLOOD PRESSURE: 96 MMHG

## 2018-08-24 DIAGNOSIS — R10.9 ABDOMINAL PAIN IN PREGNANCY, SECOND TRIMESTER: Primary | ICD-10-CM

## 2018-08-24 DIAGNOSIS — O26.892 ABDOMINAL PAIN IN PREGNANCY, SECOND TRIMESTER: Primary | ICD-10-CM

## 2018-08-24 LAB
ALBUMIN SERPL-MCNC: 3 G/DL (ref 3.5–5)
ALBUMIN/GLOB SERPL: 0.7 {RATIO} (ref 1.1–2.2)
ALP SERPL-CCNC: 67 U/L (ref 45–117)
ALT SERPL-CCNC: 16 U/L (ref 12–78)
ANION GAP SERPL CALC-SCNC: 11 MMOL/L (ref 5–15)
APPEARANCE UR: ABNORMAL
AST SERPL-CCNC: 11 U/L (ref 15–37)
BACTERIA URNS QL MICRO: ABNORMAL /HPF
BASOPHILS # BLD: 0 K/UL (ref 0–0.1)
BASOPHILS NFR BLD: 0 % (ref 0–1)
BILIRUB SERPL-MCNC: 0.2 MG/DL (ref 0.2–1)
BILIRUB UR QL: NEGATIVE
BUN SERPL-MCNC: 10 MG/DL (ref 6–20)
BUN/CREAT SERPL: 20 (ref 12–20)
CALCIUM SERPL-MCNC: 8.9 MG/DL (ref 8.5–10.1)
CHLORIDE SERPL-SCNC: 106 MMOL/L (ref 97–108)
CO2 SERPL-SCNC: 22 MMOL/L (ref 21–32)
COLOR UR: ABNORMAL
COMMENT, HOLDF: NORMAL
CREAT SERPL-MCNC: 0.51 MG/DL (ref 0.55–1.02)
DIFFERENTIAL METHOD BLD: NORMAL
EOSINOPHIL # BLD: 0.1 K/UL (ref 0–0.4)
EOSINOPHIL NFR BLD: 1 % (ref 0–7)
EPITH CASTS URNS QL MICRO: ABNORMAL /LPF
ERYTHROCYTE [DISTWIDTH] IN BLOOD BY AUTOMATED COUNT: 13.9 % (ref 11.5–14.5)
GLOBULIN SER CALC-MCNC: 4.2 G/DL (ref 2–4)
GLUCOSE SERPL-MCNC: 88 MG/DL (ref 65–100)
GLUCOSE UR STRIP.AUTO-MCNC: NEGATIVE MG/DL
HCG SERPL-ACNC: ABNORMAL MIU/ML (ref 0–6)
HCT VFR BLD AUTO: 39.6 % (ref 35–47)
HGB BLD-MCNC: 13.1 G/DL (ref 11.5–16)
HGB UR QL STRIP: NEGATIVE
IMM GRANULOCYTES # BLD: 0 K/UL (ref 0–0.04)
IMM GRANULOCYTES NFR BLD AUTO: 0 % (ref 0–0.5)
KETONES UR QL STRIP.AUTO: NEGATIVE MG/DL
LEUKOCYTE ESTERASE UR QL STRIP.AUTO: ABNORMAL
LYMPHOCYTES # BLD: 2 K/UL (ref 0.8–3.5)
LYMPHOCYTES NFR BLD: 22 % (ref 12–49)
MCH RBC QN AUTO: 30.2 PG (ref 26–34)
MCHC RBC AUTO-ENTMCNC: 33.1 G/DL (ref 30–36.5)
MCV RBC AUTO: 91.2 FL (ref 80–99)
MONOCYTES # BLD: 0.6 K/UL (ref 0–1)
MONOCYTES NFR BLD: 6 % (ref 5–13)
NEUTS SEG # BLD: 6.4 K/UL (ref 1.8–8)
NEUTS SEG NFR BLD: 70 % (ref 32–75)
NITRITE UR QL STRIP.AUTO: NEGATIVE
NRBC # BLD: 0 K/UL (ref 0–0.01)
NRBC BLD-RTO: 0 PER 100 WBC
PH UR STRIP: 6.5 [PH] (ref 5–8)
PLATELET # BLD AUTO: 202 K/UL (ref 150–400)
PMV BLD AUTO: 11.8 FL (ref 8.9–12.9)
POTASSIUM SERPL-SCNC: 3.8 MMOL/L (ref 3.5–5.1)
PROT SERPL-MCNC: 7.2 G/DL (ref 6.4–8.2)
PROT UR STRIP-MCNC: NEGATIVE MG/DL
RBC # BLD AUTO: 4.34 M/UL (ref 3.8–5.2)
RBC #/AREA URNS HPF: ABNORMAL /HPF (ref 0–5)
SAMPLES BEING HELD,HOLD: NORMAL
SODIUM SERPL-SCNC: 139 MMOL/L (ref 136–145)
SP GR UR REFRACTOMETRY: 1.03 (ref 1–1.03)
UR CULT HOLD, URHOLD: NORMAL
UROBILINOGEN UR QL STRIP.AUTO: 1 EU/DL (ref 0.2–1)
WBC # BLD AUTO: 9.2 K/UL (ref 3.6–11)
WBC URNS QL MICRO: ABNORMAL /HPF (ref 0–4)

## 2018-08-24 PROCEDURE — 80053 COMPREHEN METABOLIC PANEL: CPT | Performed by: EMERGENCY MEDICINE

## 2018-08-24 PROCEDURE — 85025 COMPLETE CBC W/AUTO DIFF WBC: CPT | Performed by: EMERGENCY MEDICINE

## 2018-08-24 PROCEDURE — 99283 EMERGENCY DEPT VISIT LOW MDM: CPT

## 2018-08-24 PROCEDURE — 76815 OB US LIMITED FETUS(S): CPT

## 2018-08-24 PROCEDURE — 36415 COLL VENOUS BLD VENIPUNCTURE: CPT | Performed by: EMERGENCY MEDICINE

## 2018-08-24 PROCEDURE — 81001 URINALYSIS AUTO W/SCOPE: CPT | Performed by: EMERGENCY MEDICINE

## 2018-08-24 PROCEDURE — 87086 URINE CULTURE/COLONY COUNT: CPT | Performed by: EMERGENCY MEDICINE

## 2018-08-24 PROCEDURE — 87077 CULTURE AEROBIC IDENTIFY: CPT | Performed by: EMERGENCY MEDICINE

## 2018-08-24 PROCEDURE — 87186 SC STD MICRODIL/AGAR DIL: CPT | Performed by: EMERGENCY MEDICINE

## 2018-08-24 PROCEDURE — 84702 CHORIONIC GONADOTROPIN TEST: CPT | Performed by: EMERGENCY MEDICINE

## 2018-08-24 PROCEDURE — 74011250637 HC RX REV CODE- 250/637: Performed by: EMERGENCY MEDICINE

## 2018-08-24 RX ORDER — ACETAMINOPHEN 325 MG/1
650 TABLET ORAL
Status: COMPLETED | OUTPATIENT
Start: 2018-08-24 | End: 2018-08-24

## 2018-08-24 RX ADMIN — ACETAMINOPHEN 650 MG: 325 TABLET ORAL at 08:35

## 2018-08-24 NOTE — Clinical Note
Thank you for allowing us to provide you with medical care today. We realize that you have many choices for your emergency care needs. We thank you for choosing [facility].  Please choose us in the future for any continued health care need s. We hope we addressed all of your medical concerns. We strive to provide excellent quality care in the Emergency Department. Anything less than excellent does not meet our expectations. The exam and treatment you received in the Emergency Depa rtment were for an emergent problem and are not intended as complete care. It is important that you follow up with a doctor, nurse practitioner, or  [id number] assistant for ongoing care. If your symptoms worsen or you do not improve as expected and you  are unable to reach your usual health care provider, you should return to the Emergency Department. We are available 24 hours a day. Take this sheet with you when you go to your follow-up visit. If you have any problem arranging the follow-up vis it, contact the Emergency Department immediately. Make an appointment your family doctor for follow up of this visit. Return to the ER if you are unable to be seen in a timely manner.

## 2018-08-24 NOTE — ED TRIAGE NOTES
Pt states she is about 15 weeks pregnant and complains of mid abd down to lower abd pain. Started Tuesday. Denies vaginal bleeding, and urinary issues.

## 2018-08-24 NOTE — DISCHARGE INSTRUCTIONS
Dolor abdominal lee el Norrine [place]: Instrucciones de cuidado - [ Belly Pain in Pregnancy: Care Instructions ]  Instrucciones de cuidado    Cuando está embarazada, cualquier dolor abdominal puede ser lazarus preocupación. Es posible que no Pollie Hammersmith a garcía médico por cada dolor que tenga. Yokasta usted no quiere pasar por alto nada que sea peligroso para usted o garcía bebé. Incluso si se siente trey algo conocido, un dolor abdominal puede significar algo nuevo cuando está embarazada. Es importante saber cuándo llamar al médico. Cruz Thornton será útil saber cómo cuidarse en el hogar cuando garcía dolor no está causado por nada perjudicial.  · Cuando el dolor abdominal es más intenso o angle, deana a un médico inmediatamente. · Si está farias de que garcía dolor abdominal es lazarus señal del [place], llame a garcía médico.  · Cuando el dolor abdominal es breve, generalmente es lazarus parte normal del Nor[place]. Puede estar relacionado con cambios en el útero que está creciendo. O puede deberse al estiramiento de los ligamentos llamados ligamentos redondos. Estos ligamentos ayudan a proporcionar soporte al [place]. El dolor de los ligamentos redondos puede presentarse en cualquiera de los dos lados del abdomen. También es posible que lo sienta en las caderas o en la yovani. La atención de seguimiento es lazarus parte clave de garcía tratamiento y seguridad. Asegúrese de hacer y acudir a todas las citas, y llame a garcía médico si está teniendo problemas. También es lazarus buena idea saber los resultados de trent exámenes y mantener lazarus lista de los medicamentos que terrie. ¿Cómo puede saber si garcía dolor abdominal es lazarus señal del [place]? Cuando el dolor abdominal está causado por el [place], puede sentirse trey cólicos leves o similares a los menstruales en la parte inferior del abdomen. Estos cólicos probablemente scout contracciones. Pueden suceder en el rommel o tercer trimestre.   También podría tener:  · Un dolor persistente y sordo en la parte baja de la espalda, en la pelvis o en los muslos. · Lazarus sensación de presión en la pelvis o en la parte baja del abdomen. · Cambios en la secreción vaginal o lazarus descarga repentina de líquido de la vagina. Si piensa que está en [facility] [place], llame a garcía médico.  ¿Cómo puede cuidarse en el hogar? Cuando el dolor abdominal es leve y no es un síntoma del trabajo de parto:  · Descanse hasta que se sienta mejor. · Dese un baño de agua tibia. · Piense en lo que come y sunita:  ¨ Mary Anne mucho líquido. Opte por beber agua y otros líquidos ely sin cafeína hasta que se sienta mejor. ¨ Pruebe a comer comidas pequeñas y frecuentes. Si tiene [employer], pruebe alimentos suaves y bajos en grasa, trey arroz sin condimentar, sofiya asado, pan jolynn y yogur. · Piense en cómo se mueve si está teniendo roscoe breves debido al estiramiento de los ligamentos redondos. ¨ Julián ejercicios de estiramiento suaves. ¨ Muévase un poco más despacio cuando se dé la vuelta en la cama o cuando se levante de lazarus silla, para que esos ligamentos no se estiren rápidamente. ¨ Inclínese un poco hacia adelante si kaylynn que va a toser o a estornudar. ¿Cuándo debe pedir ayuda? Llame al 911 en cualquier momento que considere que necesita atención de [place]. Por ejemplo, llame si:    · Tiene dolor repentino e intenso en el abdomen.     · Tiene sangrado vaginal intenso.    Llame a garcía médico ahora mismo o busque atención médica inmediata si:    · Tiene dolor nuevo en el abdomen o scott empeora, o tiene retortijones.     · Tiene cualquier tipo de sangrado vaginal.     · Tiene fiebre.     · Tiene síntomas de preeclampsia, tales trey:  ¨ Hinchazón repentina en la kendy, las shana o los pies. ¨ Problemas nuevos con la vista (trey oscurecimiento de la visión o visión borrosa). ¨ Un paulina dolor de [place].     · Piensa que puede jose f comenzado el [place].  Inland significa que ha tenido al menos 8 contracciones dentro de 1 hora o al menos 4 contracciones dentro de 20 minutos, incluso después de que cambia de posición y sunita líquidos.     · Tiene síntomas de lazarus infección urinaria. Estos pueden incluir:  ¨ Dolor o ardor al orinar. ¨ Necesidad de orinar con frecuencia sin poder eliminar mucha orina. ¨ Dolor en el flanco, el cual se siente donavan debajo de la caja torácica y por encima de la cintura en cualquiera de los lados de la espalda. ¨ Percy en la Mariah Layman.    Preste especial atención a los cambios en garcía jean-pierre y asegúrese de comunicarse con garcía médico si está preocupada por garcía jean-pierre o la de garcía bebé. ¿Dónde puede encontrar más información en inglés? Guzman Moran a [email or web address] Stephani June B275 en la búsqueda para aprender más acerca de \"Dolor abdominal lee el embarazo: Instrucciones de cuidado - [ Belly Pain in Pregnancy: Care Instructions ]. \"  Revisado: 21 noviembre, 2017  Versión del contenido: 11.7  © 9334-8569 Healthwise, Incorporated. Las instrucciones de cuidado fueron adaptadas bajo licencia por [employer] (which disclaims liability or warranty for this information). Si usted tiene [place] afección médica o sobre estas instrucciones, siempre pregunte a garcía profesional de jean-pierre. Healthwise, Incorporated niega toda garantía o responsabilidad por garcía uso de esta información.

## 2018-08-24 NOTE — ED PROVIDER NOTES
HPI Comments: 33 y.o.  female with past medical history significant for HTN, diabetes, and cholestasis of pregnancy in third trimester who presents from home via private vehicle, accompanied by her , with chief complaint of abdominal pain. Patient presents with lower abdominal pain for the last three days. She is currently 15 weeks pregnant; it is her third pregnancy. She denies having vaginal bleeding or discharge. Patient took tylenol at 0430 with some relief. She notes she has not had BM in the last three days but this is baseline. Patient specifically denies any nausea, vomiting, fever, and chills. There are no other acute medical concerns at this time. Social hx: Never tobacco smoker; Denies EtOH use; Denies illicit drug use  PCP: Kyeana Sparks MD    Note written by Satnam Schneider, as dictated by Paola Chamberlain MD 8:30 AM    The history is provided by the patient and the spouse. The history is limited by a language barrier. No  was used. Past Medical History:   Diagnosis Date    Cholestasis of pregnancy in third trimester 2015    Constipation     Gestational diabetes 2015    Hypertension     possibly per pt report in [place]. Denied it 5/5/15       No past surgical history on file. Family History:   Problem Relation Age of Onset    Diabetes Mother        Social History     Social History    Marital status: SINGLE     Spouse name: N/A    Number of children: N/A    Years of education: N/A     Occupational History    Not on file.      Social History Main Topics    Smoking status: Never Smoker    Smokeless tobacco: Never Used    Alcohol use No    Drug use: No    Sexual activity: Yes     Partners: Male     Birth control/ protection: None, Condom     Other Topics Concern    Not on file     Social History Narrative    ** Merged History Encounter **         ** Merged History Encounter **            ALLERGIES: Review of patient's allergies indicates no known allergies. Review of Systems   Constitutional: Negative for chills and fever. Gastrointestinal: Positive for abdominal pain. Negative for nausea and vomiting. Genitourinary: Negative for vaginal bleeding and vaginal discharge. All other systems reviewed and are negative. Vitals:    08/24/18 0818   BP: 110/70   Pulse: 78   Resp: 18   Temp: 97.9 °F (36.6 °C)   SpO2: 98%   Weight: 91.2 kg (201 lb)   Height: 5' 2\" (1.575 m)            Physical Exam   Constitutional: She is oriented to person, place, and time. She appears well-developed and well-nourished. No distress. NAD, AxOx4, speaking in complete sentences     HENT:   Head: Normocephalic and atraumatic. Nose: Nose normal.   Mouth/Throat: Oropharynx is clear and moist.   Eyes: Conjunctivae are normal. Pupils are equal, round, and reactive to light. Right eye exhibits no discharge. No scleral icterus. Neck: Normal range of motion. Neck supple. No JVD present. No tracheal deviation present. Cardiovascular: Normal rate, regular rhythm, normal heart sounds and intact distal pulses. Exam reveals no gallop and no friction rub. No murmur heard. Pulmonary/Chest: Effort normal and breath sounds normal. No respiratory distress. She has no wheezes. She has no rales. She exhibits no tenderness. Abdominal: Soft. Bowel sounds are normal. There is no tenderness. There is no rebound and no guarding. Exam consistent w/ dates;     Neg peritoneal signs; Genitourinary: No vaginal discharge found. Genitourinary Comments: Pt denies urinary/ vaginal complaints   Musculoskeletal: Normal range of motion. She exhibits no edema or tenderness. Neurological: She is alert and oriented to person, place, and time. She has normal reflexes. No cranial nerve deficit. She exhibits normal muscle tone. Coordination normal.   pt has motor/ CV/ Sensation grossly intact to all extremities, R = L in strength;   Skin: Skin is warm and dry.  No rash noted. No erythema. No pallor. Psychiatric: She has a normal mood and affect. Her behavior is normal. Thought content normal.   Nursing note and vitals reviewed. [facility]      ED Course       Procedures    11:17 AM Pt told of results/ agrees w/ plans; 'will see Dr Fernando Duncan's  results have been reviewed with her. She has been counseled regarding her diagnosis. She verbally conveys understanding and agreement of the signs, symptoms, diagnosis, treatment and prognosis and additionally agrees to Call/ Arrange follow up as recommended with Dr. Hemanth Javed MD in 24 - 48 hours. She also agrees with the care-plan and conveys that all of her questions have been answered. I have also put together some discharge instructions for her that include: 1) educational information regarding their diagnosis, 2) how to care for their diagnosis at home, as well a 3) list of reasons why they would want to return to the ED prior to their follow-up appointment, should their condition change or for concerns.

## 2018-08-28 LAB
BACTERIA SPEC CULT: ABNORMAL
BACTERIA SPEC CULT: ABNORMAL
CC UR VC: ABNORMAL
SERVICE CMNT-IMP: ABNORMAL

## 2018-08-30 RX ORDER — AMOXICILLIN 500 MG/1
500 TABLET, FILM COATED ORAL 2 TIMES DAILY
Qty: 20 TAB | Refills: 0 | Status: SHIPPED | OUTPATIENT
Start: 2018-08-30 | End: 2018-09-09

## 2018-08-30 NOTE — PROGRESS NOTES
Called and spoke with patient.  One [facility] for amoxicillin 500 mg bid x 10 d to [facility]

## 2018-09-05 ENCOUNTER — ROUTINE PRENATAL (OUTPATIENT)
Dept: FAMILY MEDICINE CLINIC | Age: 28
End: 2018-09-05

## 2018-09-05 VITALS
TEMPERATURE: 98 F | BODY MASS INDEX: 35.88 KG/M2 | HEART RATE: 80 BPM | HEIGHT: 62 IN | WEIGHT: 195 LBS | SYSTOLIC BLOOD PRESSURE: 112 MMHG | OXYGEN SATURATION: 98 % | RESPIRATION RATE: 16 BRPM | DIASTOLIC BLOOD PRESSURE: 72 MMHG

## 2018-09-05 DIAGNOSIS — O99.210 MATERNAL OBESITY AFFECTING PREGNANCY, ANTEPARTUM: ICD-10-CM

## 2018-09-05 DIAGNOSIS — Z34.82 ENCOUNTER FOR SUPERVISION OF OTHER NORMAL PREGNANCY, SECOND TRIMESTER: Primary | ICD-10-CM

## 2018-09-05 DIAGNOSIS — O23.42 URINARY TRACT INFECTION IN MOTHER DURING SECOND TRIMESTER OF PREGNANCY: ICD-10-CM

## 2018-09-05 LAB
BILIRUB UR QL STRIP: NORMAL
GLUCOSE UR-MCNC: NEGATIVE MG/DL
KETONES P FAST UR STRIP-MCNC: NORMAL MG/DL
PH UR STRIP: 7 [PH] (ref 4.6–8)
PROT UR QL STRIP: NEGATIVE
SP GR UR STRIP: 1.02 (ref 1–1.03)
UA UROBILINOGEN AMB POC: NORMAL (ref 0.2–1)
URINALYSIS CLARITY POC: NORMAL
URINALYSIS COLOR POC: NORMAL
URINE BLOOD POC: NEGATIVE
URINE LEUKOCYTES POC: NORMAL
URINE NITRITES POC: NEGATIVE

## 2018-09-05 NOTE — PROGRESS NOTES
Breanne Aden is a 33 y.o. female  No chief complaint on file. Visit Vitals    [facility] 04/13/2018     1. Have you been to the ER, urgent care clinic since your last visit? Hospitalized since your last visit? Yes. [facility]. Treated for UTI Aug 30th    2. Have you seen or consulted any other health care providers outside of the [address] [place] [address] since your last visit? Include any pap smears or colon screening.  No     Health Maintenance Due   Topic Date Due    Influenza Age 5 to Adult  08/01/2018

## 2018-09-05 NOTE — PROGRESS NOTES
Chief Complaint   Patient presents with    Routine Prenatal Visit     , 17 wks     Blood pressure 112/72, pulse 80, temperature 98 °F (36.7 °C), temperature source Oral, resp. rate 16, height 5' 2\" (1.575 m), weight 195 lb (88.5 kg), last menstrual period 2018, SpO2 98 %, not currently breastfeeding. 1. Have you been to the ER, urgent care clinic since your last visit? Hospitalized since your last visit? No    2. Have you seen or consulted any other health care providers outside of the [address] [place] [address] since your last visit? Include any pap smears or colon screening.  No

## 2018-09-05 NOTE — PROGRESS NOTES
RETURN OB VISIT SUMMARY    Subjective:   She has no unusual complaints and denies any vb, lof, ctxs, and notes good fetal movement. Denies any urinary symptoms. Denies any fever. Objective:   Physical Exam:  ABDOMEN: obese and FHT present @ 140s bpm  See prenatal flowsheet and physical exam section    Assessment/Plan:   Normal pregnancy at 17w0d    ED warnings. RTC 4 wks. Desires AFP tetra today. Referral to [facility] for anatomy ultrasound. Hx of UTI. Currently being treated with amoxicillin. Repeat urine culture today. Routine Prenatal care.

## 2018-09-05 NOTE — PATIENT INSTRUCTIONS
Pruebas de detección de anomalías congénitas: Instrucciones de cuidado - [ Screening Tests for Birth Defects: Care Instructions ]  Instrucciones de cuidado    Las pruebas de detección de anomalías congénitas (de nacimiento) se realizan lee el embarazo para detectar posibles problemas con el bebé (feto). Muestran la posibilidad de que un bebé tenga lazarus determinada anomalía congénita. Stephany Mt son el síndrome de Down, la jared bífida y la trisomía 18. Existen muchos tipos de pruebas de detección que pueden hacerle lee el [facility]. Lee el primer trimestre, es posible que le natasha:  · Análisis de [place] 10 y 15. · Prueba de translucencia nucal entre las semanas 6 y 15. · Análisis de ADN fetal phillip a las 10 semanas o más tarde. Lee el rommel trimestre, es posible que le natasha:  · Prueba de detección triple o cuádruple entre las semanas 13 y 21. · [address]. 18 y 21. La atención de seguimiento es lazarus parte clave de garcía tratamiento y seguridad. Asegúrese de hacer y acudir a todas las citas, y llame a garcía médico si está teniendo problemas. También es lazarus buena idea saber los resultados de trent exámenes y mantener lazarus lista de los medicamentos que terrie. ¿Por qué se hacen estas pruebas? Los análisis de chet miden las cantidades de determinadas sustancias en garcía chet. Para realizar MARILUZ, un profesional de la jean-pierre le terrie lazarus [facility] juan. [place] fetal phillip se [place] para ayudar a detectar problemas genéticos. La prueba de detección triple o cuádruple es un análisis de chet que se puede utilizar para determinar si existe un riesgo de tener determinados problemas de [place]. Si cualquiera de estas pruebas indica un problema, el médico le sugerirá otras pruebas para determinar con seguridad si hay un problema. La prueba de translucencia nucal utiliza lazarus ecografía para medir el grosor de la nguyen de la nuca del bebé.  Un aumento en el grosor puede ser lazarus señal temprana de ciertas anomalías congénitas. La ecografía es lazarus herramienta que utiliza ondas sonoras para crear imágenes del bebé y de la placenta dentro del [place]. La ecografía le permite a gracía médico anita lazarus imagen de garcía bebé. Puede ayudarle a garcía médico a detectar problemas del corazón, de la columna vertebral, del abdomen o de otras zonas. Muchas mujeres embarazadas deciden hacerse estas pruebas trey parte rutinaria de garcía cuidado. [place] hacerse pruebas si tienen un mayor riesgo de polly a carol a un bebé con lazarus anomalía congénita. ¿Cómo puede cuidarse en el hogar? · Puede retomar de inmediato trent actividades habituales para esta etapa del embarazo, a menos que garcía médico le dé instrucciones diferentes. · Si está inquieta o preocupada por los [place] [address] de trent pruebas, hable con trent seres queridos o trent [place]. También puede hablar con un asesor genético o con garcía médico.  ¿Cuándo debe pedir ayuda? Vigile muy de cerca los cambios en garcía jean-pierre, y asegúrese de comunicarse con garcía médico si tiene algún problema. ¿Dónde puede encontrar más información en inglés? Ezequiel May a http://negrita-loren.info/. Escriba H970 en la búsqueda para aprender más acerca de \"Pruebas de detección de anomalías congénitas: Instrucciones de cuidado - [ Screening Tests for Birth Defects: Care Instructions ]. \"  Revisado: 21 noviembre, 2017  Versión del contenido: 11.7  © 4823-1791 [employer], Incorporated. Las instrucciones de cuidado fueron adaptadas bajo licencia por Good Help Connections (which disclaims liability or warranty for this information). Si usted tiene [place] afección médica o sobre estas instrucciones, siempre pregunte a garcía profesional de jean-pierre. [place] [employer] niega toda garantía o responsabilidad por garcía uso de esta información. Prueba de detección triple o cuádruple:  Instrucciones de cuidado - [ Triple or Quadruple Screen Test: Care Instructions ]  Instrucciones de cuidado    En algún momento entre las semanas 15 y 21 de garcía embarazo, cooper puede decidir hacerse un análisis de chet para saber más sobre la jean-pierre de garcía bebé en desarrollo (feto). García médico puede usar los [place] de garcía edad y la edad de garcía feto para calcular el riesgo de que se produzcan ciertos problemas de [place]. Estos pueden incluir síndrome de Down o lazarus columna que no esté cerrada (jared bífida). García médico puede preferir la prueba de detección triple o la prueba de detección cuádruple. La prueba triple mide la cantidad de alfafetoproteína (AFP), gonadotropina coriónica humana [facility]) y estriol (uE3) en garcía chet. La prueba cuádruple evalúa estas kelly medidas más la inhibina A.  Estas pruebas solo pueden decirle si hay probabilidad de que pudiera jose f algún problema. No pueden decirle con certeza que hay un problema. Si los resultados de la prueba apuntan a un problema, garcía médico podría recomendarle que se juana otras pruebas para determinar con certeza si hay un problema. Estas pruebas incluyen la amniocentesis y la ecografía. Cooper decide si quiere hacerse estas pruebas. Asegúrese de hablar sobre trent preferencias e inquietudes con garcía médico.  La atención de seguimiento es lazarus parte clave de garcía tratamiento y seguridad. Asegúrese de hacer y acudir a todas las citas, y llame a garcía médico si está teniendo problemas. También es lazarus buena idea saber los resultados de trent exámenes y mantener lazarus lista de los medicamentos que terrie. ¿Por qué debería hacerse lazarus prueba triple o cuádruple? · Usted quiere saber si hay lazarus buena probabilidad de que el feto esté iwona. · Usted se sentiría ansiosa por todo el therese de garcía embarazo si no supiera que el feto puede tener algún problema. · García médico le recomienda la prueba. · García riesgo de tener un bebé con problemas es más alto de lo normal.  ¿Por qué no debería hacerse la prueba?   · García médico le recomienda que se juana Lizeth Alanis amniocentesis. En rita maicol, la prueba no sería útil. [place] es porque la amniocentesis le dará más información. · Piensa que la prueba la pondrá [place]. · Usted continuaría el embarazo si las pruebas indicaran que el embarazo no es normal.  ¿Dónde puede encontrar más información en inglés? Renate Ibrahim a http://negrita-loren.info/. Stephanie Hong J653 en la búsqueda para aprender más acerca de \"Prueba de detección triple o cuádruple: Instrucciones de cuidado - [ Triple or Quadruple Screen Test: Care Instructions ]. \"  Revisado: 21 noviembre, 2017  Versión del contenido: 11.7  © 1881-7551 Healthwise, Incorporated. Las instrucciones de cuidado fueron adaptadas bajo licencia por Good [place] (which disclaims liability or warranty for this information). Si usted tiene [place] afección médica o sobre estas instrucciones, siempre pregunte a solomon profesional de jean-pierre. Healthwise, Incorporated niega toda garantía o responsabilidad por solomon uso de esta información. Aprenda sobre el [place] y la obesidad - [ Learning About Pregnancy and Obesity ]  ¿Qué efecto tiene solomon peso en solomon [place]? La mayoría de las mujeres [place], independientemente de solomon tamaño, tienen bebés saludables. Y los conceptos básicos de la atención médica son los mismos para todas las mujeres. Usted tendrá la misma cantidad de visitas médicas y los mismos tipos de exámenes que cualquier otra josefina [place]. Recibirá lo que necesita para tener un bebé saludable. [place] solomon tamaño puede marcar lazarus diferencia en algunas cosas. Usted y solomon médico tendrán que vigilar solomon peso lee el [place]. Tendrá que prestar mucha atención a cosas trey la presión arterial y la posibilidad de tener diabetes gestacional. (La diabetes gestacional es un tipo de diabetes que algunas mujeres tienen lee el [place]). Se le dará la misma atención especial a solomon bebé en desarrollo.  Solomon peso también puede afectar al [place] y al Sofie Kussmaul con garcía médico para recibir el cuidado que necesita. Kojo Pinch a todas las visitas médicas y siga los consejos de garcía médico acerca de lo que debe hacer y lo que debe evitar lee el embarazo. ¿Qué debería saber acerca de adelgazar y engordar? · El embarazo no es el momento de adelgazar. García bebé necesita que usted tenga lazarus alimentación equilibrada. No elimine grupos de alimentos ni juana ningún tipo de dieta para adelgazar. · Los expertos recomiendan lazarus subida de peso de entre 11 y 21 libras (5 y 9 kilos). García médico colaborará con usted para fijar lazarus meta de peso que sea [place] para usted. Es posible que el [employer] recomiende no aumentar de [place]. · Aunque con frecuencia las mujeres embarazadas bromean acerca de \"comer para dos\", usted no necesita comer el doble de comida. Por lo general, las mujeres embarazadas necesitan comer unas 300 calorías adicionales al día. Usted puede hacerlo por medio de un sándwich o con Ludy Massed y [employer] taza de yogur. ¿Qué puede hacer para tener un embarazo saludable? Las mejores cosas que puede hacer por usted y por garcía bebé son alimentarse de manera saludable, hacer ejercicio con regularidad, evitar el tabaco y el alcohol y acudir a las visitas médicas. · Coma lazarus variedad de alimentos de cada antony de los grupos de alimentos. Asegúrese de consumir suficiente calcio y ácido fólico.  · Richi vez desee colaborar con un dietista que la ayude a planificar comidas saludables para que ingiera la cantidad correcta de calorías para usted. · Si no hacía mucho ejercicio antes de quedar embarazada, hable con garcía médico acerca de cómo puede incrementar la actividad lentamente. Es posible que garcía médico desee establecer un programa de ejercicio junto con usted. ¿Dónde puede encontrar más información en inglés? [place] Nishant a http://negrita-loren.info/.   Escriba B644 en la búsqueda para aprender más acerca de \"Aprenda sobre el [place] y la obesidad - [ [place] About Pregnancy and Obesity ]. \"  Revisado: 21 noviembre, 2017  Versión del contenido: 11.7  © 2988-3299 Healthwise, Incorporated. Las instrucciones de cuidado fueron adaptadas bajo licencia por Good Help Connections (which disclaims liability or warranty for this information). Si usted tiene [place] afección médica o sobre estas instrucciones, siempre pregunte a garcía profesional de jean-pierre. Healthwise, Incorporated niega toda garantía o responsabilidad por garcía uso de esta información. Semanas 14 a 18 de garcía embarazo: Instrucciones de cuidado - [ Deion Outlaw 14 to 25 of Your Pregnancy: Care Instructions ]  Instrucciones de cuidado    F[address]d [place] [place] [address], es posible que se le empiece a notar que está [place]. También podría observar algunos cambios en la piel, trey picazón en algunas zonas de las ag de las shana o acné en la kendy. Mark Presto, garcía bebé puede orinar y trent primeras heces (meconio) comienzan a acumularse en el intestino. Veryl Carrie a crecerle el tita en la gilberto. En garcía próxima visita, [employer] 18 y 21, garcía médico podría hacerle lazarus ecografía. La prueba le permite al médico verificar si hay ciertos problemas. García médico también puede determinar el sexo de garcía bebé. Julia es un buen momento para pensar si Avery Reyes si garcía bebé es Donalee Brissa. Hable con garcía médico acerca de ponerse la vacuna contra la gripe para ayudar a mantenerse abdifatah lee el embarazo. Con el transcurrir del [facility], es común sentirse preocupada o ansiosa. García cuerpo está [employer] Y usted está pensando en polly a carol, en la jean-pierre de garcía bebé y en convertirse en madre. Puede aprender a sobrellevar la ansiedad y el estrés que siente. La atención de seguimiento es lazarus parte clave de garcía tratamiento y seguridad. Asegúrese de hacer y acudir a todas las citas, y llame a garcía médico si está teniendo problemas.  También es lazarus buena idea saber los resultados de trent exámenes y mantener lazarus lista de los medicamentos que terrie. ¿Cómo puede cuidarse en el hogar?   Reduzca el estrés    · Pida ayuda para cocinar y hacer los quehaceres domésticos.     · Entienda quién o qué le provoca estrés. Evite a estas personas o situaciones tanto trey le sea posible.     · Relájese todos los días. Tomarse descansos de 10 a 15 minutos puede hacerle sentir lazarus gran diferencia. [place], escuche música o tome un baño tibio.     · [place] clases de yoga o educación prenatal para aprender técnicas de relajación. También puede comprar un disco compacto de relajación.     · Juana lazarus lista de trent temores acerca de tener el bebé y ser [place]. Comparta la lista con alguien de garcía confianza. Ian Adans inquietudes son verdaderamente pequeñas, y trate de deshacerse de ellas. Ejercicio    · Si no hizo mucho ejercicio antes del embarazo, comience poco a poco. Lo mejor es caminar. Regule garcía ritmo y juana un poco más cada día.     · La caminata rápida, el trote lento, los ejercicios aeróbicos de bajo impacto, los ejercicios aeróbicos en el agua y el yoga son Dandridge Poles opciones. Algunos deportes, trey el buceo, la equitación, el esquí Natividad, la gimnasia y el esquí acuático no son Margarette Brawn idea.     · Trate de hacer por lo menos 2½ horas de ejercicio moderado a la semana, trey, por ejemplo, lazarus caminata rápida. Naomia Mcclellan de hacer esto es estar activo 30 minutos al día, por lo menos 5 días de la [place]. Está samuel estar activo en bloques de 10 minutos o más lee el día y la semana.     · Use ropa holgada. Use zapatos y un sostén que le proporcionen un buen soporte.     · Paulino Ream de calentamiento y enfriamiento para comenzar y finalizar trent ejercicios.     · Si desea usar pesas, asegúrese de que scout livianas.  Estas reducen la tensión en las articulaciones.    Manténgase en el peso ideal para usted    · Los expertos recomiendan el aumento de 1 chetna (medio kilo) al mes lee los 3 primeros meses del embarazo.     · También recomiendan aumentar 1 chetna a la semana lee los 6 últimos meses del embarazo, para aumentar de 25 a 35 libras (11 a 16 kg) en total.     · Si está por debajo del peso recomendable para usted, necesitará aumentar más, de 28 a 40 libras (13 a 18 kg) aproximadamente.     · Si tiene sobrepeso, quizás no deba aumentar tanto de [place], de 15 a 25 libras (7 a 11 kg) aproximadamente.     · Si está subiendo de Yaron Moy, use garcía sentido común. [address] [employer], y [employer], la BONDS.COMida [employer]a y [employer]. Frieda Bruins, frutas y verduras.     · Si va a tener gemelos o más bebés, es posible que garcía médico la remita a un dietista. ¿Dónde puede encontrar más información en inglés? Moody Labella a http://negrita-loren.info/. [facility] E669 en la búsqueda para aprender más acerca de \"Semanas 14 a 18 de garcía embarazo: Instrucciones de cuidado - [ Sartell Milo 14 to 25 of Your Pregnancy: Care Instructions ]. \"  Revisado: 21 noviembre, 2017  Versión del contenido: 11.7  © 0552-5826 Healthwise, Incorporated. Las instrucciones de cuidado fueron adaptadas bajo licencia por Good [employer] (which disclaims liability or warranty for this information). Si usted tiene [place] afección médica o sobre estas instrucciones, siempre pregunte a garcía profesional de jean-pierre. Healthwise, Incorporated niega toda garantía o responsabilidad por garcía uso de esta información. Precauciones en el embarazo: Instrucciones de cuidado - [ Pregnancy Precautions: Care Instructions ]  Instrucciones de cuidado    No hay lazarus manera farias de prevenir el trabajo de parto antes de la fecha esperada (trabajo de parto prematuro) o de prevenir la mayoría de otros problemas en el [facility]. [place] hay cosas que puede hacer para aumentar las probabilidades de tener un embarazo saludable. Vaya a trent citas, siga los consejos de garcía médico y cuídese. Coma samuel y juana ejercicio (si garcía médico lo permite). Y asegúrese de zehra abundante agua.   [place] Ano de seguimiento es lazarus parte clave de garcía tratamiento y seguridad. Asegúrese de hacer y acudir a todas las citas, y llame a garcía médico si está teniendo problemas. También es lazarus buena idea saber los resultados de trent exámenes y mantener lazarus lista de los medicamentos que terrie. ¿Cómo puede cuidarse en el hogar? · Asegúrese de asistir a las citas prenatales. García médico le tomará la presión arterial en cada consulta. García médico también comprobará si tiene proteínas en garcía orina. Tanto la presión arterial radha trey la presencia de proteínas en la orina son señales de preeclampsia. Esta afección puede ser peligrosa tanto para usted trey para garcía bebé. · [place] abundantes líquidos, suficientes para que garcía orina sea de color amarillo anish o transparente trey el agua. La deshidratación puede causar contracciones. Si tiene [employer], el corazón o el hígado y tiene que Chai's líquidos, hable con garcía médico antes de aumentar garcía consumo. · Notifique a garcía médico de inmediato si presenta cualquier síntoma de infección, tales trey:  ¨ Ardor cuando orina. ¨ Flujo con mal olor de la vagina. ¨ Comezón en la vagina. ¨ Fiebre sin explicación. ¨ Dolor o sensibilidad inusual en el útero o la parte baja del abdomen. · Aliméntese en forma equilibrada. Incluya muchos alimentos que scout ricos en calcio y aida. ¨ Entre los alimentos ricos en calcio se incluyen la [place], el queso, el yogur, Suazo  y el brócoli. ¨ Entre los alimentos ricos en aida se incluyen las norma ma, los [place], las aves, los [place], los frijoles, las uvas pasas, el pan de grano integral y las verduras de hojas verdes. · No fume. Si necesita ayuda para dejar de fumar, hable con garcía médico sobre programas y medicamentos para dejar de fumar. Estos pueden aumentar trent probabilidades de dejar el hábito para siempre. · No mary anne alcohol ni use drogas ilegales. · Siga las instrucciones de garcía médico acerca de la [place].  García médico le dirá cuánto ejercicio puede hacer. · Pregúntele a garcía médico si puede tener [employer]. Si usted está en riesgo de tener trabajo de [place], garcía médico podría pedirle que no tenga relaciones sexuales. · [place] precauciones para prevenir las caídas. Yulissa el embarazo las articulaciones están más sueltas y se tiene menos equilibrio. Los deportes tales trey el ciclismo, el esquí o el patinaje en línea pueden aumentar el riesgo de caídas. Y no monte a konstantin, cathie en motocicleta, juana clavados, juana esquí acuático, bucee, ni salte en paracaídas mientras está embarazada. · Evite calentarse demasiado. No use saunas ni bañeras de hidromasaje. Evite la exposición al sol en climas calientes por mucho tiempo. [place] acetaminofén (Tylenol) para bajar lazarus fiebre radha. · No tome medicamentos de venta phillip, productos herbarios ni suplementos sin hablar yaniv con garcía médico o farmacéutico.  ¿Cuándo debe pedir ayuda? Llame al 911 en cualquier momento que considere que necesita atención de [place]. Por ejemplo, llame si:    · Se desmayó (perdió el conocimiento).     · Tiene sangrado vaginal intenso.     · Tiene dolor intenso en el vientre o la pelvis.     · Le sale abundante líquido o gotea de la vagina y sabe o kaylynn que el cordón umbilical se está saliendo a garcía vagina. Si esto sucede, arrodíllese de inmediato, de mary forma que trent nalgas estén más altas que garcía gilberto. [place] disminuirá la presión sobre el cordón umbilical hasta que llegue la ayuda.    Llame a garcía médico ahora mismo o busque atención médica inmediata si:    · Tiene señales de preeclampsia, tales trey:  ¨ Se le hinchan de manera repentina la kendy, las shana o los pies. ¨ Problemas nuevos con la visión (trey oscurecimiento de la visión o visión borrosa).   ¨ Dolor de gilberto intenso.     · Tiene cualquier sangrado vaginal.     · Tiene dolor abdominal o cólicos.     · Tiene fiebre.     · Ha tenido contracciones regulares (con o sin dolor) por [facility] hora. [place] significa que tiene 8 o más contracciones en 1 hora o que tiene 4 contracciones o más en 20 minutos después de [place] de posición y zehra líquidos.     · Tiene lazarus pérdida repentina de líquido por la vagina.     · Tiene dolor en la parte baja de la espalda o presión en la pelvis que no desaparece.     · Nota que garcía bebé ha dejado de moverse o se mueve mucho menos de lo normal.    Preste especial atención a los cambios en garcía jean-pierre y asegúrese de comunicarse con garcía médico si tiene algún problema. ¿Dónde puede encontrar más información en inglés? Sofi Browning a http://negrita-loren.info/. Yue Cook A506 en la búsqueda para aprender más acerca de \"Precauciones en el embarazo: Instrucciones de cuidado - [ Pregnancy Precautions: Care Instructions ]. \"  Revisado: 21 noviembre, 2017  Versión del contenido: 11.7  © 5941-8953 Healthwise, Incorporated. Las instrucciones de cuidado fueron adaptadas bajo licencia por Good Help Connections (which disclaims liability or warranty for this information). Si usted tiene [place] afección médica o sobre estas instrucciones, siempre pregunte a garcía profesional de jean-pierre. Healthwise, Incorporated niega toda garantía o responsabilidad por garcía uso de esta información.

## 2018-09-05 NOTE — MR AVS SNAPSHOT
[address] 
[phone] Patient: Giuliano Montilla MRN: [id number] AFT:7/92/6673 Visit Information [place] Personal Médico Departamento Teléfono del Dep. Número de visita 9/5/2018  1:00 PM Alindlalitha Talamantes, 1000 [facility] [phone] [id number] Follow-up Instructions Return in about 4 weeks (around 10/3/2018), or if symptoms worsen or fail to improve, for ROBV.  
  
 10/2/2018 10:00 AM  
OB VISIT with Hipolito Dick MD  
1000 [facility]) Appt Note: prenatal visit [address]  
[phone]  
  
   
 [address] [place] [address] [phone] Upcoming Health Maintenance Date Due Influenza Age 5 to Adult 8/1/2018 PAP AKA CERVICAL CYTOLOGY 6/27/2021 DTaP/Tdap/Td series (2 - Td) 9/4/2025 Alergias  Review Complete El: 9/5/2018 Por: Sharona Wynn LPN A partir del:  9/5/2018 No Known Allergies Vacunas actuales Revisadas el:  9/4/2015 Saurav Robles Influenza Vaccine (Quad) PF 10/2/2015 Influenza Vaccine Split 10/17/2012 Tdap 9/4/2015 No revisadas esta visita You Were Diagnosed With   
  
 Katharina King for supervision of other normal pregnancy, second trimester    -  Primary ICD-10-CM: Z34.82 
ICD-9-CM: V22.1 Urinary tract infection in mother during second trimester of pregnancy     ICD-10-CM: O23.42 
ICD-9-CM: 646.63, 599.0 Maternal obesity affecting pregnancy, antepartum     ICD-10-CM: O99.210 ICD-9-CM: 649.13 Partes vitales PS Pulso Temperatura Resp [place] ( percentil de crecimiento) Peso (percentil de crecimiento) 112/72 80 98 °F (36.7 °C) (Oral) 16 5' 2\" (1.575 m) 195 lb (88.5 kg) LMP (última madi) SpO2 BMI (IMC) Estado obstétrico Estatus de tabaquísmo 04/13/2018 98% 35.67 kg/m2 Pregnant Never Smoker Historial de signos vitales BMI and BSA Data Body Mass Index Body Surface Area  
 35.67 kg/m 2 1.97 m 2 [facility] Name Phone [id number]- Emma Morgan, [address] [facility] [phone] Solomon lista de medicamentos actualizada Lista actualizada 9/5/18  1:27 PM.  Yfn Titus use solomon lista de medicamentos más reciente. amoxicillin 500 mg Tab Take 500 mg by mouth two (2) times a day for 10 days. polyethylene glycol 17 gram packet También conocido trey:  Yulissa Clause Take 1 Packet by mouth daily as needed. prenatal vit no.112-folate no6 1 mg Chew Take 1 Tab by mouth daily. psyllium seed-sucrose Powd También conocido trey:  METAMUCIL (SUGAR) Take 1 teaspoon three times a day with meals. pyridoxine (vitamin B6) 25 mg tablet También conocido trey:  VITAMIN B-6 Take 1 Tab by mouth three (3) times daily as needed. Hicimos lo siguiente AFP TETRA SCREEN, OBSTETRICAL [id number] CPT(R)] AMB POC URINALYSIS DIP STICK AUTO W/O MICRO [23932 CPT(R)] CULTURE, URINE [id number] CPT(R)] Instrucciones de seguimiento Return in about 4 weeks (around 10/3/2018), or if symptoms worsen or fail to improve, for ROBV. Instrucciones para el Paciente Pruebas de detección de anomalías congénitas: Instrucciones de cuidado - [ Screening Tests for Birth Defects: Care Instructions ] Instrucciones de cuidado Las pruebas de detección de anomalías congénitas (de nacimiento) se realizan lee el embarazo para detectar posibles problemas con el bebé (feto). Muestran la posibilidad de que un bebé tenga lazarus determinada anomalía congénita. Jack Sidhu son el síndrome de Down, la jared bífida y la trisomía 18.  Existen muchos tipos de pruebas de [employer] pueden hacerle lee el embarazo. Lee el primer trimestre, es posible que le natasha: · Análisis de [place] 10 y 15. · Prueba de translucencia nucal entre las semanas 6 y 15. · Análisis de ADN fetal phillip a las 10 semanas o más tarde. Lee el rommel trimestre, es posible que le natasha: · Prueba de detección triple o cuádruple entre las semanas 15 y 21. · [address]. 18 y 21. La atención de seguimiento es lazarus parte clave de garcía tratamiento y seguridad. Asegúrese de hacer y acudir a todas las citas, y llame a garcía médico si está teniendo problemas. También es lazarus buena idea saber los resultados de trent exámenes y mantener lazarus lista de los medicamentos que terrie. Por qué se hacen estas pruebas? Los análisis de chet miden las cantidades de determinadas sustancias en garcía chet. Para realizar [employer], un profesional de la jean-pierre le terrie lazarus [facility]. [place] fetal phillip se [place] para ayudar a detectar problemas genéticos. La prueba de detección triple o cuádruple es un análisis de chet que se puede utilizar para determinar si existe un riesgo de tener determinados problemas de [place]. Si cualquiera de estas pruebas indica un problema, el médico le sugerirá otras pruebas para determinar con seguridad si hay un problema. La prueba de translucencia nucal utiliza lazarus ecografía para medir el grosor de la nguyen de la nuca del bebé. Un aumento en el grosor puede ser lazarus señal temprana de ciertas anomalías congénitas. La ecografía es lazarus herramienta que utiliza ondas sonoras para crear imágenes del bebé y de la placenta dentro del Anastacio Prayer. La ecografía le permite a garcía médico anita lazarus imagen de garcía bebé. Puede ayudarle a garcía médico a detectar problemas del corazón, de la columna vertebral, del abdomen o de otras zonas. Muchas mujeres embarazadas deciden hacerse estas pruebas trey parte rutinaria de garcía cuidado.  Jese Arce hacerse pruebas si tienen un mayor riesgo de polly a carol a un bebé con lazarus anomalía congénita. Cómo puede cuidarse en el hogar? · Puede retomar de inmediato trent actividades habituales para esta etapa del embarazo, a menos que garcía médico le dé instrucciones diferentes. · Si está inquieta o preocupada por los [place] [address] de trent pruebas, hable con trent seres queridos o trent [place]. También puede hablar con un asesor genético o con garcía médico. 

Cuándo debe pedir ayuda? Vigile muy de cerca los cambios en garcía jean-pierre, y asegúrese de comunicarse con garcía médico si tiene algún problema. Dónde puede encontrar más información en inglés? Donold Noss a http://negrita-loren.info/. Escriba H970 en la búsqueda para aprender más acerca de \"Pruebas de detección de anomalías congénitas: Instrucciones de cuidado - [ Screening Tests for Birth Defects: Care Instructions ]. \" 
Revisado: 21 noviembre, 2017 Versión del contenido: 11.7 © 6832-0478 Healthwise, Incorporated. Las instrucciones de cuidado fueron adaptadas bajo licencia por Good Help Connections (which disclaims liability or warranty for this information). Si usted tiene [place] afección médica o sobre estas instrucciones, siempre pregunte a garcía profesional de jean-pierre. Healthwise, Incorporated niega toda garantía o responsabilidad por garcía uso de esta información. Prueba de detección triple o cuádruple: Instrucciones de cuidado - [ Triple or Quadruple Screen Test: Care Instructions ] Instrucciones de cuidado En algún Laurent's 15 y 21 de garcía embarazo, anny puede decidir [employer] un análisis de chet para saber más sobre la jean-pierre de garcía bebé en desarrollo (feto). García médico puede usar los [place] de garcía edad y la edad de garcía feto para calcular el riesgo de que se produzcan ciertos problemas de [place]. Estos pueden incluir síndrome de Down o lazarus columna que no esté cerrada (jared bífida).  
García médico puede preferir la prueba de detección triple o la prueba de detección cuádruple. La prueba triple mide la cantidad de alfafetoproteína (AFP), gonadotropina coriónica humana [facility]) y estriol (uE3) en garcía chet. La prueba cuádruple evalúa estas kelly medidas más la inhibina A. 
Estas pruebas solo pueden decirle si hay probabilidad de que pudiera jose f algún problema. No pueden decirle con certeza que hay un problema. Si los resultados de la prueba apuntan a un problema, garcía médico podría recomendarle que se juana otras pruebas para determinar con certeza si hay un problema. Estas pruebas incluyen la amniocentesis y la ecografía. Usted decide si quiere hacerse estas pruebas. Asegúrese de hablar sobre trent preferencias e inquietudes con garcía médico. 
La atención de seguimiento es lazarus parte clave de garcía tratamiento y seguridad. Asegúrese de hacer y acudir a todas las citas, y llame a garcía médico si está teniendo problemas. También es lazarus buena idea saber los resultados de trent exámenes y mantener lazarus lista de los medicamentos que terrie. Por qué debería hacerse lazarus prueba triple o cuádruple? · Usted quiere saber si hay lazarus buena probabilidad de que el feto esté iwona. · Usted se sentiría ansiosa por todo el therese de garcía embarazo si no supiera que el feto puede tener algún problema. · García médico le recomienda la prueba. · García riesgo de tener un bebé con problemas es más alto de lo normal. 

Por qué no debería hacerse la prueba? · García médico le recomienda que se juana lazarus amniocentesis. En rita maicol, la prueba no sería útil. [place] es porque la amniocentesis le dará más información. · Piensa que la prueba la pondrá [place]. · Usted continuaría el embarazo si las pruebas indicaran que el embarazo no es normal. 

Dónde puede encontrar más información en inglés? Sofi Browning a http://negrita-loren.info/. Yue Cook M480 en la búsqueda para aprender más acerca de \"Prueba de detección triple o cuádruple: Instrucciones de cuidado - [ Triple or Quadruple Screen Test: Care Instructions ]. \" [facility] Versión del contenido: 11.7 © 7817-8132 Healthwise, Incorporated. Las instrucciones de cuidado fueron adaptadas bajo licencia por Good Help Connections (which disclaims liability or warranty for this information). Si usted tiene [place] afección médica o sobre estas instrucciones, siempre pregunte a garcía profesional de jean-pierre. Healthwise, Incorporated niega toda garantía o responsabilidad por garcía uso de esta información. Aprenda sobre el [facility] y la obesidad - [ Learning About Pregnancy and Obesity ] Winston Ramming tiene garcía peso en garcía [facility]? La mayoría de las mujeres Ba [place], independientemente de garcía tamaño, tienen bebés saludables. Y los conceptos básicos de la atención médica son los mismos para todas las mujeres. Usted tendrá la misma cantidad de visitas médicas y los mismos tipos de exámenes que cualquier otra josefina [place]. Recibirá lo que necesita para tener un bebé saludable. [place] garcía tamaño puede marcar lazarus diferencia en algunas cosas. Usanny y garcía médico tendrán que vigilar garcía peso lee el [facility]. Tendrá que prestar mucha atención a cosas trey la presión arterial y la posibilidad de tener diabetes gestacional. (La diabetes gestacional es un tipo de diabetes que algunas mujeres tienen lee el [facility]). Se le dará la misma atención especial a garcía bebé en desarrollo. García peso también puede afectar al Carla Gonsales de parto y al parto. Colabore con garíca médico para recibir el cuidado que necesita. Carol Kira a todas las visitas médicas y siga los consejos de garcía médico acerca de lo que debe hacer y lo que debe evitar lee el embarazo. Robet Stamp saber acerca de adelgazar y engordar? · El embarazo no es el momento de adelgazar. García bebé necesita que usted tenga lazarus alimentación equilibrada. No elimine grupos de alimentos ni juana ningún tipo de dieta para adelgazar.  
· Los expertos recomiendan lazarus subida de peso de [place] 11 y 21 libras (11 y 9 kilos). García médico colaborará con usted para fijar lazarus meta de peso que sea [place] para usted. Es posible que el timeplazza recomiende no aumentar de [place]. · Aunque con frecuencia las mujeres embarazadas bromean acerca de \"comer para dos\", usted no necesita comer el doble de comida. Por lo general, las mujeres embarazadas necesitan comer unas 300 calorías adicionales al día. Usted puede hacerlo por medio de un sándwich o con Denette Areola y Joseph Mooring taza de yogur. Qué puede hacer para tener un embarazo saludable? Las mejores cosas que puede hacer por usted y por garcía bebé son alimentarse de manera saludable, hacer ejercicio con regularidad, evitar el tabaco y el alcohol y acudir a las visitas médicas. · Coma lazarus variedad de alimentos de cada antony de los grupos de alimentos. Asegúrese de consumir suficiente calcio y ácido fólico. 
· Richi vez desee colaborar con un dietista que la ayude a planificar comidas saludables para que ingiera la cantidad correcta de calorías para usted. · Si no hacía mucho ejercicio antes de quedar embarazada, hable con garcía médico acerca de cómo puede incrementar la actividad lentamente. Es posible que garcía médico desee establecer un programa de ejercicio junto con usted. Dónde puede encontrar más información en inglés? Soledad Osborn a http://negrita-loren.info/. Escriba B644 en la búsqueda para aprender más acerca de \"Aprenda sobre el [place] y la obesidad - [ Learning About Pregnancy and Obesity ]. \" 
Revisado: 21 noviembre, 2017 Versión del contenido: 11.7 © 1824-5600 Healthwise, Incorporated. Las instrucciones de cuidado fueron adaptadas bajo licencia por Good Help Connections (which disclaims liability or warranty for this information). Si usted tiene [place] afección médica o sobre estas instrucciones, siempre pregunte a garcía profesional de jean-pierre. Healthwise, Incorporated niega toda garantía o responsabilidad por garcía uso de esta información. Semanas 14 a 18 de garcía embarazo: Instrucciones de cuidado - [ Oleta Batman 14 to 25 of Your Pregnancy: Care Instructions ] Instrucciones de cuidado Yulissa [employer], es posible que se le empiece a notar que está [place]. También podría observar algunos cambios en la piel, trey picazón en algunas zonas de las ag de las shana o acné en la kendy. Shelia Gabe, garcía bebé puede orinar y trent primeras heces (meconio) comienzan a acumularse en el intestino. Kelly [place] a crecerle el tita en la gilberto. En garcía próxima visita, [employer] 18 y 21, garcía médico podría hacerle lazarus ecografía. La prueba le permite al médico verificar si hay ciertos problemas. García médico también puede determinar el sexo de garcía bebé. Julia es un buen momento para pensar si [place] si garcía bebé es Manuelita Casino. Hable con garcía médico acerca de ponerse la vacuna contra la gripe para ayudar a mantenerse abdifatah yulissa el embarazo. Con el transcurrir del Leonardo [facility], es común sentirse preocupada o ansiosa. García cuerpo está [employer] Y usted está pensando en polly a carol, en la jean-pierre de garcía bebé y en convertirse en madre. Puede aprender a sobrellevar la ansiedad y el estrés que siente. La atención de seguimiento es lazarus parte clave de garcía tratamiento y seguridad. Asegúrese de hacer y acudir a todas las citas, y llame a garcía médico si está teniendo problemas. También es lazarus buena idea saber los resultados de trent exámenes y mantener lazarus lista de los medicamentos que terrie. Cómo puede cuidarse en el hogar? 
 Reduzca el estrés 
  · Pida ayuda para cocinar y hacer los quehaceres domésticos.  
  · Entienda quién o qué le provoca estrés. Evite a estas personas o situaciones tanto trey le sea posible.  
  · Relájese todos los días. Tomarse descansos de 10 a 15 minutos puede hacerle sentir lazarus gran diferencia.  Camine, escuche música o tome un baño tibio.  
  · [place] clases de yoga o educación prenatal para aprender técnicas de relajación. También puede comprar un disco compacto de relajación.  
  · Julián lazarus lista de trent temores acerca de tener el bebé y ser [place]. Comparta la lista con alguien de garcía confianza. Geraldo Mccauley inquietudes son verdaderamente pequeñas, y trate de deshacerse de ellas. Ejercicio 
  · Si no hizo mucho ejercicio antes del embarazo, comience poco a poco. Lo mejor es caminar. Regule garcía ritmo y julián un poco más cada día.  
  · La caminata rápida, el trote lento, los ejercicios aeróbicos de bajo impacto, los ejercicios aeróbicos en el agua y el yoga son Maggi Innocent opciones. Algunos deportes, trey el buceo, la equitación, el esquí Natividad, la gimnasia y el esquí acuático no son Ladona Livers idea.  
  · Trate de hacer por lo menos 2½ horas de ejercicio moderado a la semana, trey, por ejemplo, lazarus caminata rápida. Nikole Rm de hacer esto es estar activo 30 minutos al día, por lo menos 5 días de la [place]. Está samuel estar activo en bloques de 10 minutos o más lee el día y la semana.  
  · Use ropa holgada. Use zapatos y un sostén que le proporcionen un buen soporte.  
  · Creasie Clock de calentamiento y enfriamiento para comenzar y finalizar trent ejercicios.  
  · Si desea usar pesas, asegúrese de que scuot livianas. Estas reducen la tensión en las articulaciones.  
 Manténgase en el peso ideal para usted 
  · Los expertos recomiendan el aumento de 1 chetna (medio kilo) al mes lee los 3 primeros meses del embarazo.  
  · También recomiendan aumentar 1 chetna a la [employer] 6 últimos meses del [facility], para aumentar de 25 a 35 libras (11 a 16 kg) en total.  
  · Si está por debajo del peso recomendable para usted, necesitará aumentar más, de 28 a 40 libras (13 a 18 kg) aproximadamente.  
  · Si tiene sobrepeso, quizás no deba aumentar tanto de [place], de 15 a 25 libras (7 a 11 kg) aproximadamente.  
  · Si está subiendo de Yaron Moy, use garcía sentido común.  Mauricio Guillen ejercicio todos los días, y Aon Tap 'n Tap, la comida rápida y [place]. [place] Teri, frutas y verduras.  
  · Si va a tener gemelos o más bebés, es posible que garcía médico la remita a un dietista. Dónde puede encontrar más información en inglés? Traci Pollack a http://negrita-loren.info/. Bianca E253 en la búsqueda para aprender más acerca de \"Semanas 14 a 18 de garcía embarazo: Instrucciones de cuidado - [ Krystyna Lonnie 14 to 25 of Your Pregnancy: Care Instructions ]. \" 
Revisado: 21 noviembre, 2017 Versión del contenido: 11.7 © 9689-6768 Healthwise, Incorporated. Las instrucciones de cuidado fueron adaptadas bajo licencia por Good Help Connections (which disclaims liability or warranty for this information). Si usted tiene [place] afección médica o sobre estas instrucciones, siempre pregunte a garcía profesional de jean-pierre. Healthwise, Incorporated niega toda garantía o responsabilidad por garcía uso de esta información. Precauciones en el embarazo: Instrucciones de cuidado - [ Pregnancy Precautions: Care Instructions ] Instrucciones de cuidado No hay lazarus manera farias de prevenir el trabajo de parto antes de la fecha esperada (trabajo de parto prematuro) o de prevenir la mayoría de otros problemas en el [facility]. [place] hay cosas que puede hacer para aumentar las probabilidades de tener un embarazo saludable. Vaya a trent citas, siga los consejos de garcía médico y cuídese. Coma samuel y juana ejercicio (si garcía médico lo permite). Y asegúrese de zehra abundante agua. La atención de seguimiento es lazarus parte clave de garcía tratamiento y seguridad. Asegúrese de hacer y acudir a todas las citas, y llame a garcía médico si está teniendo problemas. También es lazarus buena idea saber los resultados de trent exámenes y mantener lazarus lista de los medicamentos que terrie. Cómo puede cuidarse en el hogar? · Asegúrese de asistir a las citas prenatales.  García [address] presión arterial en cada consulta. García médico también comprobará si tiene proteínas en garcía orina. Tanto la presión arterial radha trey la presencia de proteínas en la orina son señales de preeclampsia. Esta afección puede ser peligrosa tanto para usted trey para garcía bebé. · Bing abundantes líquidos, suficientes para que garcía orina sea de color amarillo anish o transparente trey el agua. La deshidratación puede causar contracciones. Si tiene [facility], el corazón o el hígado y tiene que [place]'s líquidos, hable con garcía médico antes de aumentar garcía consumo. · Notifique a garcía médico de inmediato si presenta cualquier síntoma de infección, tales trey: ¨ Ardor cuando orina. ¨ Flujo con mal olor de la vagina. ¨ Comezón en la vagina. ¨ Fiebre sin explicación. ¨ Dolor o sensibilidad inusual en el útero o la parte baja del abdomen. · Aliméntese en forma equilibrada. Incluya muchos alimentos que scout ricos en calcio y aida. ¨ Entre los alimentos ricos en calcio se incluyen la [place], el queso, el yogur, Cookie [place] y el brócoli. ¨ Entre los alimentos ricos en aida se incluyen las norma ma, los [place], las aves, los [place], los frijoles, las uvas pasas, el pan de grano integral y las verduras de hojas verdes. · No fume. Si necesita ayuda para dejar de fumar, hable con garcía médico sobre programas y medicamentos para dejar de fumar. Estos pueden aumentar trent probabilidades de dejar el hábito para siempre. · No bing alcohol ni use drogas ilegales. · Siga las instrucciones de garcía médico acerca de la [place]. García médico le dirá cuánto ejercicio puede hacer. · Pregúntele a garcía médico si puede tener [employer]. Si usted está en riesgo de tener trabajo de [place], garcía médico podría pedirle que no tenga relaciones sexuales. · [place] precauciones para prevenir las caídas. Yulissa el embarazo las articulaciones están más sueltas y se tiene menos equilibrio.  Los deportes tales trey el ciclismo, el esquí o el patinaje en línea pueden aumentar el riesgo de caídas. Y no monte a konstantin, cathie en motocicleta, juana clavados, juana esquí acuático, bucee, ni salte en paracaídas mientras está embarazada. · Evite calentarse demasiado. No use saunas ni bañeras de hidromasaje. Evite la exposición al sol en climas calientes por mucho tiempo. [place] acetaminofén (Tylenol) para bajar lazarus fiebre radha. · No tome medicamentos de venta phillip, productos herbarios ni suplementos sin hablar yaniv con garcía médico o farmacéutico. 

Cuándo debe pedir ayuda? Llame al 911 en cualquier momento que considere que necesita atención de [place]. Por ejemplo, llame si: 
  · Se desmayó (perdió el conocimiento).  
  · Tiene sangrado vaginal intenso.  
  · Tiene dolor intenso en el vientre o la pelvis.  
  · Le sale abundante líquido o gotea de la vagina y sabe o kaylynn que el cordón umbilical se está saliendo a garcía vagina. Si esto sucede, arrodíllese de inmediato, de mary forma que trent nalgas estén más altas que garcía gilberto. [place] disminuirá la presión sobre el cordón umbilical hasta que llegue la ayuda.  
 Llame a garcía médico ahora mismo o busque atención médica inmediata si: 
  · Tiene señales de preeclampsia, tales trey: ¨ Se le hinchan de manera repentina la kendy, las shana o los pies. ¨ Problemas nuevos con la visión (trey oscurecimiento de la visión o visión borrosa). ¨ Dolor de gilberto intenso.  
  · Tiene cualquier sangrado vaginal.  
  · Tiene dolor abdominal o cólicos.  
  · Tiene fiebre.  
  · Ha tenido contracciones regulares (con o sin dolor) por Leticia [place]. [place] significa que tiene 8 o más contracciones en 1 hora o que tiene 4 contracciones o más en 20 minutos después de [place] de posición y zehra líquidos.  
  · Tiene lazarus pérdida repentina de líquido por la vagina.  
  · Tiene dolor en la parte baja de la espalda o presión en la pelvis que no desaparece.   · Nota que garcía bebé ha dejado de moverse o se mueve mucho menos de lo normal.  
 Preste especial atención a los cambios en garcía jean-pierre y asegúrese de comunicarse con garcía médico si tiene algún problema. Dónde puede encontrar más información en inglés? Juan Pablo Barrett a http://negrita-loren.info/. Anthony Pickett B957 en la búsqueda para aprender más acerca de \"Precauciones en el embarazo: Instrucciones de cuidado - [ Pregnancy Precautions: Care Instructions ]. \" 
Revisado: 21 noviembre, 2017 Versión del contenido: 11.7 © 7147-0451 Healthwise, Incorporated. Las instrucciones de cuidado fueron adaptadas bajo licencia por Good Inbilin Connections (which disclaims liability or warranty for this information). Si usted tiene [employer] afección médica o sobre estas instrucciones, siempre pregunte a garcía profesional de jean-pierre. Healthwise, Incorporated niega toda garantía o responsabilidad por garcía uso de esta información. Introducing [address]! [facility] introduce portal paciente MyChart . Ahora se puede acceder a partes de garcía expediente médico, enviar por correo electrónico la oficina de garcía médico y solicitar renovaciones de medicamentos en línea. En garcía navegador de Internet , Isadora Candelaria a [email or web address] Juana clic en el usuario por Nydia Infante? Eloina Bocanegra clic aquí en la sesión [facility]. Verá la página de registro Geraldine. Ingrese garcía código de [facility] mary y trey aparece a continuación. Usted no tendrá que [employer] código después de jose f completado el proceso de registro . Si usted no se inscribe antes de la fecha de caducidad , debe solicitar un nuevo código. · MyChart Código de acceso : [id number] Expires: 12/4/2018  5:22 AM 
 
Mark Goodell los últimos cuatro dígitos de garcía Número de Seguro Social ( xxxx ) y fecha de nacimiento ( dd / mm / aaaa ) trey se indica y juana clic en Enviar. Usted será llevado a la siguiente página de registro . Crear un ID MyChart . Esta será garcía ID de inicio de sesión de MyChart y no puede ser [place] , por lo que pensar en lazarus que es Madlyn Manners y fácil de recordar . Crear lazarus contraseña MyChart . Usted puede cambiar garcía contraseña en cualquier momento . Ingrese garcía Password Reset de preguntas y Marte . Magalia se puede utilizar en un momento posterior si usted olvida garcía contraseña. Introduzca garcía dirección de correo electrónico . Debbe Cancel recibirá lazarus notificación por correo electrónico cuando la nueva información está disponible en MyChart . Agustín Talladega clic en Registrarse. Merlucille Clutter anita y descargar porciones de garcía expediente médico. 
Julián clic en el enlace de descarga del menú Resumen para descargar lazarus copia portátil de garcía información médica . Si tiene [employer] , por favor visite la sección de preguntas frecuentes del sitio web MyChart . Recuerde, MyChart NO es que se utilizará para las necesidades urgentes. Para emergencias médicas , llame al 911 . Ahora disponible en garcía iPhone y Android ! Por favor proporcione scott resumen de la documentación de cuidado a garcía próximo proveedor. Your primary care clinician is listed as Shanice Harris. If you have any questions after today's visit, please call [phone].

## 2018-09-06 LAB — BACTERIA UR CULT: NORMAL

## 2018-09-11 LAB
2ND TRIMESTER 4 SCREEN SERPL-IMP: NORMAL
2ND TRIMESTER 4 SCREEN SERPL-IMP: NORMAL
AFP ADJ MOM SERPL: 1.34
AFP SERPL-MCNC: 42.7 NG/ML
AGE AT DELIVERY: 28 YR
COMMENTS, 018014: NORMAL
FET TS 18 RISK FROM MAT AGE: NORMAL
FET TS 21 RISK FROM MAT AGE: 857
GA METHOD: NORMAL
GA: 17 WEEKS
HCG ADJ MOM SERPL: 2.62
HCG SERPL-ACNC: NORMAL MIU/ML
IDDM PATIENT QL: NO
INHIBIN A ADJ MOM SERPL: 1.07
INHIBIN A SERPL-MCNC: 152.21 PG/ML
MULTIPLE PREGNANCY: NO
NEURAL TUBE DEFECT RISK FETUS: 4273 %
RESULTS, 017389: NORMAL
TS 18 RISK FETUS: NORMAL
TS 21 RISK FETUS: NORMAL
U ESTRIOL ADJ MOM SERPL: 1.11
U ESTRIOL SERPL-MCNC: 1.03 NG/ML

## 2018-09-27 ENCOUNTER — HOSPITAL ENCOUNTER (OUTPATIENT)
Dept: PERINATAL CARE | Age: 28
Discharge: HOME | End: 2018-09-27
Attending: OBSTETRICS & GYNECOLOGY
Payer: SUBSIDIZED

## 2018-09-27 PROCEDURE — 76811 OB US DETAILED SNGL FETUS: CPT | Performed by: OBSTETRICS & GYNECOLOGY

## 2018-09-28 NOTE — PROGRESS NOTES
33year old  Normal anatomy scan Urine cultures -- need to follow Other lab results reviewed:  negative

## 2018-10-01 ENCOUNTER — TELEPHONE (OUTPATIENT)
Dept: FAMILY MEDICINE CLINIC | Age: 28
End: 2018-10-01

## 2018-10-01 NOTE — TELEPHONE ENCOUNTER
----- Message from Deion Goddard sent at 10/1/2018  7:48 AM EDT -----  Regarding: Dr. Amira Herrera: [phone]  Patient is requesting a rescheduled appointment for Friday 10. 5.2018. She would like her appointment for Tuesday Oct 2, 2018 to be moved to Friday 10. 5.2018. Patient would like a call once this has been done.  Patient's best contact number is [phone]

## 2018-10-01 NOTE — TELEPHONE ENCOUNTER
Spoke with patient requesting to reschedule prenatal appointment . Appointment rescheduled for 10/5/18 with Dr. Cecille Diaz. Patient verbalized understanding.

## 2018-10-05 ENCOUNTER — TELEPHONE (OUTPATIENT)
Dept: FAMILY MEDICINE CLINIC | Age: 28
End: 2018-10-05

## 2018-10-05 ENCOUNTER — ROUTINE PRENATAL (OUTPATIENT)
Dept: FAMILY MEDICINE CLINIC | Age: 28
End: 2018-10-05

## 2018-10-05 VITALS
RESPIRATION RATE: 16 BRPM | SYSTOLIC BLOOD PRESSURE: 98 MMHG | DIASTOLIC BLOOD PRESSURE: 65 MMHG | HEART RATE: 66 BPM | WEIGHT: 195 LBS | BODY MASS INDEX: 35.88 KG/M2 | HEIGHT: 62 IN | OXYGEN SATURATION: 100 % | TEMPERATURE: 97.6 F

## 2018-10-05 DIAGNOSIS — Z3A.21 21 WEEKS GESTATION OF PREGNANCY: Primary | ICD-10-CM

## 2018-10-05 LAB
BILIRUB UR QL STRIP: NEGATIVE
GLUCOSE UR-MCNC: NEGATIVE MG/DL
KETONES P FAST UR STRIP-MCNC: NEGATIVE MG/DL
PH UR STRIP: 7 [PH] (ref 4.6–8)
PROT UR QL STRIP: NEGATIVE
SP GR UR STRIP: 1.02 (ref 1–1.03)
UA UROBILINOGEN AMB POC: NORMAL (ref 0.2–1)
URINALYSIS CLARITY POC: CLEAR
URINALYSIS COLOR POC: YELLOW
URINE BLOOD POC: NEGATIVE
URINE LEUKOCYTES POC: NEGATIVE
URINE NITRITES POC: NEGATIVE

## 2018-10-05 NOTE — MR AVS SNAPSHOT
[address] 
[phone] Patient: Ranulfo Blood MRN: [id number] Visit Information Deirdre Come y [place] Personal Médico Departamento Teléfono del Dep. Número de visita 10/5/2018  2:00 PM Pau NanceBrett [place] [phone]  
  
 10/31/2018 11:00 AM  
OB VISIT with Pau Nance MD  
[facility]) Appt Note: 25 weeks [address] [employer] [address] [place] [address]  
[phone]  
  
   
 [address] [place] [employer] [facility] [id number] Upcoming Health Maintenance Date Due Influenza Age 5 to Adult 8/1/2018 PAP AKA CERVICAL CYTOLOGY 6/27/2021 DTaP/Tdap/Td series (2 - Td) 9/4/2025 Alergias  Review Complete El: 10/5/2018 Por: Delmar Sorto LPN A partir del:  10/5/2018 No Known Allergies Vacunas actuales Revisadas el:  9/4/2015 Neil Raphael Influenza Vaccine (Quad) PF  Incomplete, 10/2/2015 Influenza Vaccine Split 10/17/2012 Tdap 9/4/2015 No revisadas esta visita You Were Diagnosed With   
  
 Kari Blood 21 weeks gestation of pregnancy    -  Primary ICD-10-CM: Z3A.21 
ICD-9-CM: V22.2 Partes vitales PS Pulso Temperatura Resp [place] ( percentil de crecimiento) Peso (percentil de crecimiento) 98/65 (BP 1 Location: Left arm, BP Patient Position: Sitting) 66 97.6 °F (36.4 °C) (Oral) 16 5' 2\" (1.575 m) 195 lb (88.5 kg) LMP (última madi) SpO2 BMI (IMC) Estado obstétrico Estatus de tabaquísmo 04/13/2018 100% 35.67 kg/m2 Pregnant Never Smoker Historial de signos vitales BMI and BSA Data Body Mass Index Body Surface Area  
 35.67 kg/m 2 1.97 m 2 Preferred Pharmacy Pharmacy Name Phone [id number] [facility], [address] [facility] [phone] García lista de medicamentos actualizada Lista actualizada 10/5/18  3:12 PM.  Joanna Infante use garcía lista de medicamentos más reciente. polyethylene glycol 17 gram packet También conocido trey:  Emaline Medicine Take 1 Packet by mouth daily as needed. prenatal vit no.112-folate no6 1 mg Chew Take 1 Tab by mouth daily. psyllium seed-sucrose Powd También conocido trey:  METAMUCIL (SUGAR) Take 1 teaspoon three times a day with meals. pyridoxine (vitamin B6) 25 mg tablet También conocido trey:  VITAMIN B-6 Take 1 Tab by mouth three (3) times daily as needed. Hicimos lo siguiente AMB POC URINALYSIS DIP STICK AUTO W/O MICRO [20718 CPT(R)] INFLUENZA VIRUS VAC QUAD,SPLIT,PRESV FREE SYRINGE IM [id number] CPT(R)] OK IMMUNIZ ADMIN,1 SINGLE/COMB VAC/TOXOID [id number] CPT(R)] Instrucciones para el Paciente Gestational Diabetes Diet: Care Instructions Your Care Instructions Gestational diabetes is a form of diabetes that can happen during pregnancy. It usually goes away after the baby is born. Diabetes means that your pancreas can't make enough insulin or your body does not use insulin properly. Insulin helps sugar enter your cells, where it is used for energy. You may be able to control your blood sugar while you are pregnant by eating a healthy diet and getting regular exercise. A dietitian or certified diabetes educator (CDE) can help you make a food plan. This plan will help control your blood sugar and provide good nutrition for you and your baby. If diet and exercise don't lower or control your blood sugar, you may need diabetes medicine or insulin. Follow-up care is a key part of your treatment and safety. Be sure to make and go to all appointments, and call your doctor if you are having problems. It's also a good idea to know your test results and keep a list of the medicines you take. How can you care for yourself at home? · Learn which foods have carbohydrate. Eating too much carbohydrate will cause your blood sugar to go too high. Carbohydrate foods include: ¨ Breads, cereals, pasta, and rice. ¨ Dried beans and starchy vegetables, like corn, peas, and potatoes. ¨ Fruits and fruit juice, milk, and yogurt. ¨ Candy, table sugar, soda pop, and drinks sweetened with sugar. · Learn how much carbohydrate you need each day. A dietitian or certified diabetes educator (CDE) can teach you how to keep track of how much carbohydrate you eat. · Try to eat the same amount of carbohydrate at each meal. This will help keep your blood sugar steady. Do not save up your daily allowance of carbohydrate to eat at one meal. 
· Limit foods that have added sugar. This includes candy, desserts, and soda pop. These foods need to be counted as part of your total carbohydrate intake for the day. · Do not drink alcohol. Alcohol is not safe for you or your baby. · Do not skip meals. Your blood sugar may drop too low if you skip meals and use insulin. · Write down what you eat every day. Review your record with your dietitian or CDE to see if you are eating the right amounts of foods. · Check your blood sugar first thing in the morning before you eat. Then check your blood sugar 1 to 2 hours after the first bite of each meal (or as your doctor recommends). This will help you see how the food you eat affects your blood sugar. Keep track of these levels. Share the record with your doctor. When should you call for help? Watch closely for changes in your health, and be sure to contact your doctor if: 
  · You have questions about your diet.  
  · You often have problems with high or low blood sugar. Where can you learn more? Go to http://negrita-loren.info/. Enter M291 in the search box to learn more about \"Gestational Diabetes Diet: Care Instructions. \" Current as of: December 7, 2017 Content Version: 11.8 © 4639-3185 Healthwise, Incorporated. Care instructions adapted under license by Travelzen.[employer] (which disclaims liability or warranty for this information). If you have questions about a medical condition or this instruction, always ask your healthcare professional. [address] any warranty or liability for your use of this information. Gestational Diabetes Diet: Care Instructions Your Care Instructions Gestational diabetes is a form of diabetes that can happen during pregnancy. It usually goes away after the baby is born. Diabetes means that your pancreas can't make enough insulin or your body does not use insulin properly. Insulin helps sugar enter your cells, where it is used for energy. You may be able to control your blood sugar while you are pregnant by eating a healthy diet and getting regular exercise. A dietitian or certified diabetes educator (CDE) can help you make a food plan. This plan will help control your blood sugar and provide good nutrition for you and your baby. If diet and exercise don't lower or control your blood sugar, you may need diabetes medicine or insulin. Follow-up care is a key part of your treatment and safety. Be sure to make and go to all appointments, and call your doctor if you are having problems. It's also a good idea to know your test results and keep a list of the medicines you take. How can you care for yourself at home? · Learn which foods have carbohydrate. Eating too much carbohydrate will cause your blood sugar to go too high. Carbohydrate foods include: ¨ Breads, cereals, pasta, and rice. ¨ Dried beans and starchy vegetables, like corn, peas, and potatoes. ¨ Fruits and fruit juice, milk, and yogurt. ¨ Candy, table sugar, soda pop, and drinks sweetened with sugar. · Learn how much carbohydrate you need each day.  A dietitian or certified diabetes educator (CDE) can teach you how to keep track of how much carbohydrate you eat. · Try to eat the same amount of carbohydrate at each meal. This will help keep your blood sugar steady. Do not save up your daily allowance of carbohydrate to eat at one meal. 
· Limit foods that have added sugar. This includes candy, desserts, and soda pop. These foods need to be counted as part of your total carbohydrate intake for the day. · Do not drink alcohol. Alcohol is not safe for you or your baby. · Do not skip meals. Your blood sugar may drop too low if you skip meals and use insulin. · Write down what you eat every day. Review your record with your dietitian or CDE to see if you are eating the right amounts of foods. · Check your blood sugar first thing in the morning before you eat. Then check your blood sugar 1 to 2 hours after the first bite of each meal (or as your doctor recommends). This will help you see how the food you eat affects your blood sugar. Keep track of these levels. Share the record with your doctor. When should you call for help? Watch closely for changes in your health, and be sure to contact your doctor if: 
  · You have questions about your diet.  
  · You often have problems with high or low blood sugar. Where can you learn more? Go to http://negrita-loren.info/. Enter M291 in the search box to learn more about \"Gestational Diabetes Diet: Care Instructions. \" Current as of: December 7, 2017 Content Version: 11.8 © 7644-4883 PackLate.com. Care instructions adapted under license by Crowdsourcing.[employer] (which disclaims liability or warranty for this information). If you have questions about a medical condition or this instruction, always ask your healthcare professional. [address] any warranty or liability for your use of this information. Dieta para la diabetes gestacional: Instrucciones de cuidado - [ Gestational Diabetes Diet: Care Instructions ] Instrucciones de cuidado La diabetes gestacional es lazarus forma de diabetes que puede presentarse lee el [facility]. Suele desaparecer después de que nace el bebé. La diabetes significa que el páncreas no puede producir suficiente insulina o que el organismo no la puede usar de [place]. La insulina ayuda a que el azúcar [employer], donde se [place] trey energía. Es posible que usted pueda controlar garcía nivel de azúcar en la chet lee el embarazo con lazarus Cooper Nyasia saludable y haciendo ejercicio con regularidad. Un dietista o un educador certificado en diabetes (CDE, por trent siglas en [facility]) le puede ayudar a preparar un plan de alimentación. Julia plan le ayudará a controlar el nivel de azúcar en la chet y les brindará lazarus buena nutrición a usted y a garcía bebé. Si la dieta y el ejercicio no reducen ni controlan garcía nivel de azúcar en la chet, podría necesitar insulina o medicamentos para la diabetes. La atención de seguimiento es lazarus parte clave de garcía tratamiento y seguridad. Asegúrese de hacer y acudir a todas las citas, y llame a garcía médico si está teniendo problemas. También es lazarus buena idea saber los resultados de trent exámenes y mantener lazarus lista de los medicamentos que terrie. Cómo puede cuidarse en el hogar? · Sepa qué alimentos contienen carbohidratos. Consumir demasiados carbohidratos hará que garcía nivel de azúcar en la chet se eleve demasiado. Entre los alimentos que contienen carbohidratos se encuentran: 
¨ Los panes, los cereales, la pasta y el arroz. ¨ Los frijoles (habichuelas) secos y las verduras con Laverta Dress (elote), las arvejas (chícharos) y las jc. ¨ Las frutas y el jugo de frutas, la Sage y el yogur. ¨ Los dulces, el azúcar de [place], las sodas y las bebidas endulzadas con azúcar. · Sepa qué cantidad de carbohidratos necesita por día. Un dietista o un educador de diabetes certificado le puede enseñar a llevar la cuenta de la cantidad de carbohidratos que consume. · Trate de consumir la misma cantidad de carbohidratos en cada comida. [place] le ayudará a mantener estable el nivel de azúcar en la chet. No acumule garcía cuota diaria de carbohidratos para consumirlos en lazarus denise comida. · Limite los alimentos con azúcar añadida. Entre estos se encuentran los [place], los postres y las sodas. Estos alimentos necesitan contarse trey parte de garcía consumo total de carbohidratos para el día. · No bing alcohol. El alcohol no es seguro ni para usted ni para garcía bebé. · No se salte las comidas. Si omite comidas y Michelle, garcía nivel de azúcar en la chet podría bajar demasiado. · Anote lo que come cada día. Revise garcía registro con garcía dietista o garcía educador de diabetes para determinar si está consumiendo las cantidades correctas de alimentos. · Lo yaniv que debe hacer en la mañana antes de comer es revisarse el nivel de azúcar en la [place]. Después, revísese el nivel de azúcar de la chet 1 o 2 horas después del primer bocado de cada comida (o trey se lo recomiende garcía médico). [place] le permitirá determinar de qué manera los alimentos que consume le afectan el nivel de azúcar en la chet. Lleve el registro de esos niveles y muéstreselo a garcía médico. 

Cuándo debe pedir ayuda? Preste especial atención a los cambios en garcía jean-pierre y asegúrese de comunicarse con garcía médico si: 
  · Tiene preguntas acerca de garcía dieta.  
  · Frecuentemente tiene problemas con niveles elevados o bajos de azúcar en la chet. Dónde puede encontrar más información en inglés? Georgi doty http://negrita-loren.info/. Cecile Rivas M291 en la búsqueda para aprender más acerca de \"Dieta para la diabetes gestacional: Instrucciones de cuidado - [ Gestational Diabetes Diet: Care Instructions ]. \" 
Revisado: 7 fatmata, 2017 Versión del contenido: 11.8 © 9399-0797 Healthwise, [employer].  Las instrucciones de cuidado fueron adaptadas bajo licencia por Good Help Connections (which disclaims liability or warranty for this information). Si usted tiene [place] afección médica o sobre estas instrucciones, siempre pregunte a garcía profesional de jean-pierre. HealthNipomo, Incorporated niega toda garantía o responsabilidad por garcía uso de esta información. Introducing [facility]! [employer] introduces Kustom Codes patient portal. Now you can access parts of your medical record, email your doctor's office, and request medication refills online. 1. In your internet browser, go to [email or web address] 2. Click on the First Time User? Click Here link in the Sign In box. You will see the New Member Sign Up page. 3. Enter your Kustom Codes Access Code exactly as it appears below. You will not need to use this code after youve completed the sign-up process. If you do not sign up before the expiration date, you must request a new code. · Kustom Codes Access Code: [email or web address] Expires: 12/4/2018  5:22 AM 
 
4. Enter the last four digits of your Social Security Number (xxxx) and Date of Birth (mm/dd/yyyy) as indicated and click Submit. You will be taken to the next sign-up page. 5. Create a Kustom Codes ID. This will be your Kustom Codes login ID and cannot be changed, so think of one that is secure and easy to remember. 6. Create a Kustom Codes password. You can change your password at any time. 7. Enter your Password Reset Question and Answer. This can be used at a later time if you forget your password. 8. Enter your e-mail address. You will receive e-mail notification when new information is available in [address] 9. Click Sign Up. You can now view and download portions of your medical record. 10. Click the Download Summary menu link to download a portable copy of your medical information. If you have questions, please visit the Frequently Asked Questions section of the Kustom Codes website. Remember, Kustom Codes is NOT to be used for urgent needs. For medical emergencies, dial 911. Now available from your iPhone and Android! Please provide this summary of care documentation to your next provider. Your primary care clinician is listed as Ashley Hoang. If you have any questions after today's visit, please call [phone].

## 2018-10-05 NOTE — PATIENT INSTRUCTIONS
Gestational Diabetes Diet: Care Instructions  Your Care Instructions    Gestational diabetes is a form of diabetes that can happen during pregnancy. It usually goes away after the baby is born. Diabetes means that your pancreas can't make enough insulin or your body does not use insulin properly. Insulin helps sugar enter your cells, where it is used for energy. You may be able to control your blood sugar while you are pregnant by eating a healthy diet and getting regular exercise. A dietitian or certified diabetes educator (CDE) can help you make a food plan. This plan will help control your blood sugar and provide good nutrition for you and your baby. If diet and exercise don't lower or control your blood sugar, you may need diabetes medicine or insulin. Follow-up care is a key part of your treatment and safety. Be sure to make and go to all appointments, and call your doctor if you are having problems. It's also a good idea to know your test results and keep a list of the medicines you take. How can you care for yourself at home? · Learn which foods have carbohydrate. Eating too much carbohydrate will cause your blood sugar to go too high. Carbohydrate foods include:  ¨ Breads, cereals, pasta, and rice. ¨ Dried beans and starchy vegetables, like corn, peas, and potatoes. ¨ Fruits and fruit juice, milk, and yogurt. ¨ Candy, table sugar, soda pop, and drinks sweetened with sugar. · Learn how much carbohydrate you need each day. A dietitian or certified diabetes educator (CDE) can teach you how to keep track of how much carbohydrate you eat. · Try to eat the same amount of carbohydrate at each meal. This will help keep your blood sugar steady. Do not save up your daily allowance of carbohydrate to eat at one meal.  · Limit foods that have added sugar. This includes candy, desserts, and soda pop. These foods need to be counted as part of your total carbohydrate intake for the day.   · Do not drink alcohol. Alcohol is not safe for you or your baby. · Do not skip meals. Your blood sugar may drop too low if you skip meals and use insulin. · Write down what you eat every day. Review your record with your dietitian or CDE to see if you are eating the right amounts of foods. · Check your blood sugar first thing in the morning before you eat. Then check your blood sugar 1 to 2 hours after the first bite of each meal (or as your doctor recommends). This will help you see how the food you eat affects your blood sugar. Keep track of these levels. Share the record with your doctor. When should you call for help? Watch closely for changes in your health, and be sure to contact your doctor if:    · You have questions about your diet.     · You often have problems with high or low blood sugar. Where can you learn more? Go to http://negritaEarthLinkloren.info/. Enter M291 in the search box to learn more about \"Gestational Diabetes Diet: Care Instructions. \"  Current as of: December 7, 2017  Content Version: 11.8  © 2467-9980 [employer]. Care instructions adapted under license by [employer] (which disclaims liability or warranty for this information). If you have questions about a medical condition or this instruction, always ask your healthcare professional. [address] any warranty or liability for your use of this information. Gestational Diabetes Diet: Care Instructions  Your Care Instructions    Gestational diabetes is a form of diabetes that can happen during pregnancy. It usually goes away after the baby is born. Diabetes means that your pancreas can't make enough insulin or your body does not use insulin properly. Insulin helps sugar enter your cells, where it is used for energy. You may be able to control your blood sugar while you are pregnant by eating a healthy diet and getting regular exercise.  A dietitian or certified diabetes educator (CDE) can help you make a food plan. This plan will help control your blood sugar and provide good nutrition for you and your baby. If diet and exercise don't lower or control your blood sugar, you may need diabetes medicine or insulin. Follow-up care is a key part of your treatment and safety. Be sure to make and go to all appointments, and call your doctor if you are having problems. It's also a good idea to know your test results and keep a list of the medicines you take. How can you care for yourself at home? · Learn which foods have carbohydrate. Eating too much carbohydrate will cause your blood sugar to go too high. Carbohydrate foods include:  ¨ Breads, cereals, pasta, and rice. ¨ Dried beans and starchy vegetables, like corn, peas, and potatoes. ¨ Fruits and fruit juice, milk, and yogurt. ¨ Candy, table sugar, soda pop, and drinks sweetened with sugar. · Learn how much carbohydrate you need each day. A dietitian or certified diabetes educator (CDE) can teach you how to keep track of how much carbohydrate you eat. · Try to eat the same amount of carbohydrate at each meal. This will help keep your blood sugar steady. Do not save up your daily allowance of carbohydrate to eat at one meal.  · Limit foods that have added sugar. This includes candy, desserts, and soda pop. These foods need to be counted as part of your total carbohydrate intake for the day. · Do not drink alcohol. Alcohol is not safe for you or your baby. · Do not skip meals. Your blood sugar may drop too low if you skip meals and use insulin. · Write down what you eat every day. Review your record with your dietitian or CDE to see if you are eating the right amounts of foods. · Check your blood sugar first thing in the morning before you eat. Then check your blood sugar 1 to 2 hours after the first bite of each meal (or as your doctor recommends). This will help you see how the food you eat affects your blood sugar.  Keep track of these levels. Share the record with your doctor. When should you call for help? Watch closely for changes in your health, and be sure to contact your doctor if:    · You have questions about your diet.     · You often have problems with high or low blood sugar. Where can you learn more? Go to http://negrita-loren.info/. Enter M291 in the search box to learn more about \"Gestational Diabetes Diet: Care Instructions. \"  Current as of: December 7, 2017  Content Version: 11.8  © 2119-7743 [employer]. Care instructions adapted under license by [employer] (which disclaims liability or warranty for this information). If you have questions about a medical condition or this instruction, always ask your healthcare professional. [address] any warranty or liability for your use of this information. Dieta para la diabetes gestacional: Instrucciones de cuidado - [ Gestational Diabetes Diet: Care Instructions ]  Instrucciones de cuidado    La diabetes gestacional es lazarus forma de diabetes que puede presentarse lee el [place]. Suele desaparecer después de que nace el bebé. La diabetes significa que el páncreas no puede producir suficiente insulina o que el organismo no la puede usar de [place]. La insulina ayuda a que el azúcar [employer], donde se [place] trey energía. Es posible que usted pueda controlar garcía nivel de azúcar en la chet lee el embarazo con lazarus David Dk saludable y haciendo ejercicio con regularidad. Un dietista o un educador certificado en diabetes (CDE, por trent siglas en inglés) le puede ayudar a preparar un plan de alimentación. Julia plan le ayudará a controlar el nivel de azúcar en la chet y les brindará lazarus buena nutrición a usted y a garcía bebé. Si la dieta y el ejercicio no reducen ni controlan garcía nivel de azúcar en la chet, podría necesitar insulina o medicamentos para la diabetes.   [address] seguimiento es lazarus parte clave de garcía tratamiento y seguridad. Asegúrese de hacer y acudir a todas las citas, y llame a garcía médico si está teniendo problemas. También es lazarus buena idea saber los resultados de trent exámenes y mantener lazarus lista de los medicamentos que terrie. ¿Cómo puede cuidarse en el hogar? · Sepa qué alimentos contienen carbohidratos. Consumir demasiados carbohidratos hará que garcía nivel de azúcar en la chet se eleve demasiado. Entre los alimentos que contienen carbohidratos se encuentran:  ¨ Los panes, los cereales, la pasta y el arroz. ¨ Los frijoles (habichuelas) secos y las verduras con [employer] (elote), las arvejas (chícharos) y las jc. ¨ Las frutas y el jugo de frutas, la [place] y el yogur. ¨ Los dulces, el azúcar de [place], las sodas y las bebidas endulzadas con azúcar. · Sepa qué cantidad de carbohidratos necesita por día. Un dietista o un educador de diabetes certificado le puede enseñar a llevar la cuenta de la cantidad de carbohidratos que consume. · Trate de consumir la misma cantidad de carbohidratos en cada comida. [place] le ayudará a mantener estable el nivel de azúcar en la chet. No acumule garcía cuota diaria de carbohidratos para consumirlos en lazarus denise comida. · Limite los alimentos con azúcar añadida. Entre estos se encuentran los [place], los postres y las sodas. Estos alimentos necesitan contarse trey parte de garcía consumo total de carbohidratos para el día. · No bing alcohol. El alcohol no es seguro ni para usted ni para garcía bebé. · No se salte las comidas. Si omite comidas y Michelle, garcía nivel de azúcar en la chet podría bajar demasiado. · Anote lo que come cada día. Revise garcía registro con garcía dietista o garcía educador de diabetes para determinar si está consumiendo las cantidades correctas de alimentos. · Lo yaniv que debe hacer en la mañana antes de comer es revisarse el nivel de azúcar en la [place].  Después, revísese el nivel de azúcar de la chet 1 o 2 horas después del primer bocado de cada comida (o trey se lo recomiende garcía médico). [place] le permitirá determinar de qué manera los alimentos que consume le afectan el nivel de azúcar en la chet. Lleve el registro de esos niveles y muéstreselo a garcía médico.  ¿Cuándo debe pedir ayuda? Preste especial atención a los cambios en garcía jean-pierre y asegúrese de comunicarse con garcía médico si:    · Tiene preguntas acerca de garcía dieta.     · Frecuentemente tiene problemas con niveles elevados o bajos de azúcar en la chet. ¿Dónde puede encontrar más información en inglés? Blaise Hancock a http://negrita-loren.info/. Sarah Harper M291 en la búsqueda para aprender más acerca de \"Dieta para la diabetes gestacional: Instrucciones de cuidado - [ Gestational Diabetes Diet: Care Instructions ]. \"  Revisado: 7 diciembre, 2017  Versión del contenido: 11.8  © 0065-3936 [employer]. Las instrucciones de cuidado fueron adaptadas bajo licencia por [employer] (which disclaims liability or warranty for this information). Si usted tiene [place] afección médica o sobre estas instrucciones, siempre pregunte a garcía profesional de jean-pierre. Healthwise, Incorporated niega toda garantía o responsabilidad por garcía uso de esta información.

## 2018-10-05 NOTE — PROGRESS NOTES
Identified Patient with two Patient identifiers (Name and ). Two Patient Identifiers confirmed. Reviewed record in preparation for visit and have obtained necessary documentation. Chief Complaint   Patient presents with    Routine Prenatal Visit     21w2d       Visit Vitals    BP 98/65 (BP 1 Location: Left arm, BP Patient Position: Sitting)    Pulse 66    Temp 97.6 °F (36.4 °C) (Oral)    Resp 16    Ht 5' 2\" (1.575 m)    Wt 195 lb (88.5 kg)    SpO2 100%    BMI 35.67 kg/m2       1. Have you been to the ER, urgent care clinic since your last visit? Hospitalized since your last visit? No    2. Have you seen or consulted any other health care providers outside of the [address] [place] [address] since your last visit? Include any pap smears or colon screening. No     + fetal movement - patient declines vaginal bleeding, d/c, or LOF.

## 2018-10-05 NOTE — PROGRESS NOTES
Return OB Visit       Subjective:   Niko Ponce 33 y.o.   ALEXX: 2019, by Ultrasound  GA:  [id number] pregnancy complicated by history of cholestasis and gestational diabetes in prior pregnancy and UTI s/p treatment on . States she does not have abdominal pain  , chest pain, contractions, fever, headache , nausea and vomiting, right upper quadrant pain  , shortness of breath, swelling, vaginal bleeding , vaginal leaking of fluid , visual disturbances and blood in urine, dysuria, urinary frequency and urinary urgency. Patient complains of pelvic pain after prolonged sitting. She feels baby moving. Diet: well balanced, healthy   Water intake: adequate   Prenatal Vitamins: taking       She is feeling her baby move. She denies vaginal bleeding, discharge or loss of fluid. She denies nausea, vomiting, severe abdominal pain or cramping. She denies dysuria. She denies headaches, dizziness or vision changes. She denies excessive swelling of extremities. Past Medical History - Reviewed today  Patient Active Problem List   Diagnosis Code    Obesity (BMI 30.0-34. 9) E66.9    Poor weight gain of pregnancy O26.10    Cholestasis of pregnancy in third trimester O26.613, K83.1    Elevated transaminase level R74.0    Intrahepatic cholestasis of pregnancy O26.619, K83.1    Maternal obesity affecting pregnancy, antepartum O99.210         Medications - Reviewed today  Current Outpatient Prescriptions   Medication Sig Dispense Refill    pyridoxine, vitamin B6, (VITAMIN B-6) 25 mg tablet Take 1 Tab by mouth three (3) times daily as needed. 90 Tab 1    prenatal vit no.112-folate no6 1 mg chew Take 1 Tab by mouth daily. 90 Tab 3    psyllium seed-sucrose (METAMUCIL, SUGAR,) powd Take 1 teaspoon three times a day with meals. 300 g 1    polyethylene glycol (MIRALAX) 17 gram packet Take 1 Packet by mouth daily as needed.  30 Each 0         Allergies - Reviewed today  No Known Allergies      Family History - Reviewed today  Family History   Problem Relation Age of Onset    Diabetes Mother          Social History - Reviewed today  Social History     Social History    Marital status: SINGLE     Spouse name: N/A    Number of children: N/A    Years of education: N/A     Occupational History    Not on file. Social History Main Topics    Smoking status: Never Smoker    Smokeless tobacco: Never Used    Alcohol use No    Drug use: No    Sexual activity: Yes     Partners: Male     Birth control/ protection: None, Condom     Other Topics Concern    Not on file     Social History Narrative    ** Merged History Encounter **         ** Merged History Encounter **            Health Maintenance - Reviewed today   Immunizations:     -Influenza: will receive today     -Tdap: up to date     Screening:     -Pap smear: Normal 2018      Objective:     Visit Vitals    LMP 2018       Physical Exam:  GENERAL APPEARANCE: alert, well appearing, in no apparent distress, oriented to person, place and time  LUNGS: clear to auscultation, no wheezes, rales or rhonchi, symmetric air entry  HEART: regular rate and rhythm, no murmurs  ABDOMEN: FHT present, 145 bpm  BACK: no CVA tenderness  UTERUS: gravid and consistent with 21 weeks, 22 cm  EXTREMITIES: no redness or tenderness in the calves or thighs, no edema  NEUROLOGICAL: alert, oriented, normal speech, no focal findings or movement disorder noted  PELVIC: examination not indicated. Assessment   This is a 33 y.o.   SIUP at  21w2d by Ultrasound pregnancy complicated by history of cholestasis, GDM, and obesity. Plan     1. SIUP at 21w2d:  -Prenatal labs:Chlamydia and gonorrhoeae negative,O+,Ab -, CBC wnl, Rubella immune, HepBsag neg  Syphilis neg, HIV non reactive. Varicella zoster immunity not found in prenatal labs. - U/A unremarkable  - Ultrasound for fetal Anomalies s performed and was unremarkable, Recommended that:  In light of this patient's obesity, serial ultrasound as an adjunct to clinical  -AFP tetra screen: Low-risk quad-marker screening  - Give baby ASA at 12 weeks to prevent preeclampsia  - Follow up in 4 weeks  -Received  Flu vaccine    2. Failed 1 HR GTT, passed 3 HR GTT in the setting of prior hx of GDM   - Patient has been counseled on healthy lifestyle modifications. - Will retest at 24 weeks. Labor precautions discussed, including: Regular painful contractions, lasting for greater than one hour, taking your breath away; any vaginal bleeding; any leakage of fluid; or absent or decreased fetal movement. Call M.D. on call if any of these symptoms or signs occur. I have discussed the diagnosis with the patient and the intended plan as seen in the above orders. The patient has received an after-visit summary and questions were answered concerning future plans. I have discussed medication side effects and warnings with the patient as well.     Patient discussed and examined with Dr. Berlin Umana MD  Family Medicine Resident

## 2018-10-05 NOTE — TELEPHONE ENCOUNTER
----- Message from Daisy Martinez sent at 10/5/2018  1:42 PM EDT -----  Regarding: Dr. Saima Villagomez  Pt has an appt today with Dr. Parag Martinez at 2 pm. Pt does not want to be told what the gender of her baby is.

## 2018-10-31 ENCOUNTER — ROUTINE PRENATAL (OUTPATIENT)
Dept: FAMILY MEDICINE CLINIC | Age: 28
End: 2018-10-31

## 2018-10-31 VITALS
RESPIRATION RATE: 16 BRPM | OXYGEN SATURATION: 98 % | WEIGHT: 196 LBS | DIASTOLIC BLOOD PRESSURE: 61 MMHG | HEIGHT: 62 IN | TEMPERATURE: 98.7 F | HEART RATE: 78 BPM | SYSTOLIC BLOOD PRESSURE: 96 MMHG | BODY MASS INDEX: 36.07 KG/M2

## 2018-10-31 DIAGNOSIS — Z3A.25 25 WEEKS GESTATION OF PREGNANCY: Primary | ICD-10-CM

## 2018-10-31 LAB
BILIRUB UR QL STRIP: NEGATIVE
GLUCOSE UR-MCNC: NEGATIVE MG/DL
KETONES P FAST UR STRIP-MCNC: NEGATIVE MG/DL
PH UR STRIP: 7.5 [PH] (ref 4.6–8)
PROT UR QL STRIP: NEGATIVE
SP GR UR STRIP: 1.01 (ref 1–1.03)
UA UROBILINOGEN AMB POC: NORMAL (ref 0.2–1)
URINALYSIS CLARITY POC: NORMAL
URINALYSIS COLOR POC: YELLOW
URINE BLOOD POC: NEGATIVE
URINE LEUKOCYTES POC: NEGATIVE
URINE NITRITES POC: NEGATIVE

## 2018-10-31 RX ORDER — DOCUSATE SODIUM 100 MG/1
100 CAPSULE, LIQUID FILLED ORAL 2 TIMES DAILY
Qty: 60 CAP | Refills: 2 | Status: SHIPPED | OUTPATIENT
Start: 2018-10-31 | End: 2019-01-19

## 2018-10-31 RX ORDER — RANITIDINE 150 MG/1
300 TABLET, FILM COATED ORAL 2 TIMES DAILY
Qty: 30 TAB | Refills: 1 | Status: SHIPPED | OUTPATIENT
Start: 2018-10-31 | End: 2018-12-05 | Stop reason: SDUPTHER

## 2018-10-31 NOTE — PATIENT INSTRUCTIONS
Semanas 22 a 26 de garcía embarazo: Instrucciones de cuidado - [ Uzair Jacob 22 to 26 of Your Pregnancy: Care Instructions ]  Instrucciones de cuidado    Al comenzar el 7.º mes de garcía embarazo en la semana 26, los pulmones de garcía bebé se están volviendo más sarah y se están preparando para respirar. Podría notar que garcía bebé responde al travis de garcía voz o la de garcía liudmila. También podría notar que garcía bebé se voltea y [place] menos de posición, y se retuerce o sacude más. Por lo general, las sacudidas indican que garcía bebé tiene hipo. Tener hipo es totalmente normal y dura poco tiempo. Richi vez sea buena idea pensar en asistir a lazarus clase de preparación para el parto. [place] es también un buen momento para comenzar a pensar si desea que lee el parto le administren un analgésico (medicamento para el dolor). A la mayoría de las mujeres embarazadas les hacen pruebas para la diabetes gestacional entre las semanas 24 y 29. La diabetes gestacional ocurre cuando el nivel de azúcar en la chet es muy alto lee el [facility]. La prueba es importante, porque usted puede tener diabetes gestacional y no saberlo. [place] esta enfermedad puede causarle problemas a garcía bebé. La atención de seguimiento es lazarus parte clave de garcía tratamiento y seguridad. Asegúrese de hacer y acudir a todas las citas, y llame a garcía médico si está teniendo problemas. También es lazarus buena idea saber los resultados de trent exámenes y mantener lazarus lista de los medicamentos que terrie. ¿Cómo puede cuidarse en el hogar? Alivie las molestias de las patadas de garcía bebé  · Cambie de posición. A veces, scott cambio hace que garcía bebé también cambie de posición. · Respire hondo al mismo tiempo que levanta los brazos sobre garcía [place]. Después exhale a medida que baja los brazos. Julián los ejercicios de [place] para evitar pérdidas de [facility]  · [employer] ejercicios de Keg[place] estando serrato o sentada. ? Apriete los mismos músculos que usaría para detener el chorro de [facility].  El abdomen y los muslos no deben moverse. ? Manténgalos apretados lee 3 segundos y, luego, relájelos otros 3 segundos. ? Empiece con 3 segundos. Nazia [place], añada 1 rommel cada semana hasta que sea capaz de apretar lee 10 segundos. ? Repita el ejercicio entre 10 y 13 veces [address] Julián kelly o más sesiones cada día. Alivie o reduzca la hinchazón de los pies, los tobillos, las shana y los dedos  · Si tiene los dedos hinchados, quítese los [place]. · No coma alimentos con mucha sal, trey jc fritas. · Coloque trent pies sobre un banco o un sofá todo el tiempo que le sea posible. Duerma con almohadas debajo de los pies. · No se quede de pie lee mucho tiempo ni use calzado ajustado. · Use medias elásticas de soporte. ¿Dónde puede encontrar más información en inglés? Michelle Munoz a http://negrita-loren.info/. Escriba G264 en la búsqueda para aprender más acerca de \"Semanas 22 a 26 de garcía embarazo: Instrucciones de cuidado - [ Mertha Plana 22 to 26 of Your Pregnancy: Care Instructions ]. \"  Revisado: 21 noviembre, 2017  Versión del contenido: 11.8  © 3576-6857 [employer]wise, Incorporated. Las instrucciones de cuidado fueron adaptadas bajo licencia por [employer] (which disclaims liability or warranty for this information). Si usted tiene [place] afección médica o sobre estas instrucciones, siempre pregunte a garcía profesional de jean-pierre. [employer]wise, Incorporated niega toda garantía o responsabilidad por garcía uso de esta información.

## 2018-10-31 NOTE — PROGRESS NOTES
Return OB Visit       Subjective:   Orval Dry 33 y.o.   ALEXX: 2019, by Ultrasound  GA:  25w0d. Pregnancy complicated by history of intrahepatic cholestasis and gestational diabetes in prior pregnancy and UTI s/p treatment on . At this time complains of acid reflux, exacerbated at night and with ingestion of food. Has been taking TUMS with little relief. She would also like to address her constipation, she currently is not taking anything for that    Diet: well balanced, healthy   Water intake: adequate   Prenatal Vitamins: taking     She is feeling her baby move. She denies vaginal bleeding, discharge or loss of fluid. She denies nausea, vomiting, severe abdominal pain or cramping. She denies dysuria. She denies headaches, dizziness or vision changes. She denies excessive swelling of extremities. Past Medical History - Reviewed today  Patient Active Problem List   Diagnosis Code    Obesity (BMI 30.0-34. 9) E66.9    Poor weight gain of pregnancy O26.10    Cholestasis of pregnancy in third trimester O26.613, K83.1    Elevated transaminase level R74.0    Intrahepatic cholestasis of pregnancy O26.619, K83.1    Maternal obesity affecting pregnancy, antepartum O99.210         Medications - Reviewed today  Current Outpatient Medications   Medication Sig Dispense Refill    prenatal vit no.112-folate no6 1 mg chew Take 1 Tab by mouth daily. 90 Tab 3    pyridoxine, vitamin B6, (VITAMIN B-6) 25 mg tablet Take 1 Tab by mouth three (3) times daily as needed. 90 Tab 1    psyllium seed-sucrose (METAMUCIL, SUGAR,) powd Take 1 teaspoon three times a day with meals. 300 g 1    polyethylene glycol (MIRALAX) 17 gram packet Take 1 Packet by mouth daily as needed.  30 Each 0         Allergies - Reviewed today  No Known Allergies      Family History - Reviewed today  Family History   Problem Relation Age of Onset    Diabetes Mother          Social History - Reviewed today  Social History     Socioeconomic History    Marital status: SINGLE     Spouse name: Not on file    Number of children: Not on file    Years of education: Not on file    Highest education level: Not on file   Social Needs    Financial resource strain: Not on file    Food insecurity - worry: Not on file    Food insecurity - inability: Not on file    Transportation needs - medical: Not on file   [employer] needs - non-medical: Not on file   Occupational History    Not on file   Tobacco Use    Smoking status: Never Smoker    Smokeless tobacco: Never Used   Substance and Sexual Activity    Alcohol use: No    Drug use: No    Sexual activity: Yes     Partners: Male     Birth control/protection: None, Condom   Other Topics Concern    Not on file   Social History Narrative    ** Merged History Encounter **         ** Merged History Encounter **            Health Maintenance - Reviewed today   Immunizations:     -Influenza: 10/5/2018     -Tdap:  in 3rd trimester     Screening:     -Pap smear: Normal on 2018      Objective:     Visit Vitals  BP 96/61 (BP 1 Location: Right arm, BP Patient Position: Sitting)   Pulse 78   Temp 98.7 °F (37.1 °C) (Oral)   Resp 16   Ht 5' 2\" (1.575 m)   Wt 196 lb (88.9 kg)   LMP 2018   SpO2 98%   BMI 35.85 kg/m²       Physical Exam:  GENERAL APPEARANCE: alert, well appearing, in no apparent distress  LUNGS: clear to auscultation, no wheezes, rales or rhonchi, symmetric air entry  HEART: regular rate and rhythm, no murmurs  ABDOMEN: FHT present, 155bpm  BACK: no CVA tenderness  UTERUS: gravid 26 cm  EXTREMITIES: no redness or tenderness in the calves or thighs, no edema  NEUROLOGICAL: alert, oriented, normal speech, no focal findings or movement disorder noted  PELVIC: examination not indicated.       Assessment         ICD-10-CM ICD-9-CM    1. 25 weeks gestation of pregnancy Z3A.25 V22.2 AMB POC URINALYSIS DIP STICK AUTO W/O MICRO   Renee Duncan 33 y.o.   at 76Z9O. Pregnancy complicated by history of intrahepatic cholestasis and gestational diabetes in prior pregnancy and UTI s/p treatment on 8/28. Plan     · SIUP at  25w0d  - Screening for gestational diabetes: Given Mrs Jorden Holly has already failed a 1 hour GTT on 06/27/2018, will order 3 hr GTT at this time.     - Repeat H&H  - Administered Tdap vaccine on next f.u   - Follow up in 4 weeks  · GERD        - ranitidine (ZANTAC) 300 mg tab BID  · Constipation        - Colace 100 mg tab BID    Orders Placed This Encounter    AMB POC URINALYSIS DIP STICK AUTO W/O MICRO         Labor precautions discussed, including: Regular painful contractions, lasting for greater than one hour, taking your breath away; any vaginal bleeding; any leakage of fluid; or absent or decreased fetal movement. Call M.D. on call if any of these symptoms or signs occur. I have discussed the diagnosis with the patient and the intended plan as seen in the above orders. The patient has received an after-visit summary and questions were answered concerning future plans. I have discussed medication side effects and warnings with the patient as well.     Patient discussed with Dr. Taras Salvador MD  Family Medicine Resident

## 2018-10-31 NOTE — PROGRESS NOTES
Identified Patient with two Patient identifiers (Name and ). Two Patient Identifiers confirmed. Reviewed record in preparation for visit and have obtained necessary documentation. Chief Complaint   Patient presents with    Routine Prenatal Visit     25w       Visit Vitals  BP 96/61 (BP 1 Location: Right arm, BP Patient Position: Sitting)   Pulse 78   Temp 98.7 °F (37.1 °C) (Oral)   Resp 16   Ht 5' 2\" (1.575 m)   Wt 196 lb (88.9 kg)   SpO2 98%   BMI 35.85 kg/m²       1. Have you been to the ER, urgent care clinic since your last visit? Hospitalized since your last visit? No    2. Have you seen or consulted any other health care providers outside of the [address] [place] [address] since your last visit? Include any pap smears or colon screening.  No

## 2018-10-31 NOTE — PROGRESS NOTES
34 yo     Hx IHCP and gestational diabetes    25 weeks    Already had one hour glucola which was abnormal, 3 hour glucose testing was normal -- will need to repeat now    Complaining heartburn - has tried Tums, will start ranitidine    I saw and evaluated the patient, performing the key elements of the service. I discussed the findings, assessment and plan with the resident and agree with the resident's findings and plan as documented in the resident's note.

## 2018-11-03 PROBLEM — O99.619 CONSTIPATION IN PREGNANCY: Status: ACTIVE | Noted: 2018-11-03

## 2018-11-03 PROBLEM — K21.9 ACID REFLUX: Status: ACTIVE | Noted: 2018-11-03

## 2018-11-03 PROBLEM — K59.00 CONSTIPATION IN PREGNANCY: Status: ACTIVE | Noted: 2018-11-03

## 2018-11-07 ENCOUNTER — LAB ONLY (OUTPATIENT)
Dept: FAMILY MEDICINE CLINIC | Age: 28
End: 2018-11-07

## 2018-11-07 DIAGNOSIS — Z3A.25 25 WEEKS GESTATION OF PREGNANCY: ICD-10-CM

## 2018-11-08 LAB
GLUCOSE 1H P 100 G GLC PO SERPL-MCNC: 188 MG/DL (ref 65–179)
GLUCOSE 2H P 100 G GLC PO SERPL-MCNC: 115 MG/DL (ref 65–154)
GLUCOSE 3H P 100 G GLC PO SERPL-MCNC: 78 MG/DL (ref 65–139)
GLUCOSE P FAST SERPL-MCNC: 82 MG/DL (ref 65–94)
HCT VFR BLD AUTO: 34.9 % (ref 34–46.6)
HGB BLD-MCNC: 11.3 G/DL (ref 11.1–15.9)
NOTE:, 102047: ABNORMAL

## 2018-11-09 NOTE — PROGRESS NOTES
Continue current management  32 yo    Abnormal one hour glucola  Only one of 4 values abnormal on 3 hour GTT

## 2018-11-26 ENCOUNTER — ROUTINE PRENATAL (OUTPATIENT)
Dept: FAMILY MEDICINE CLINIC | Age: 28
End: 2018-11-26

## 2018-11-26 VITALS
WEIGHT: 196.4 LBS | HEART RATE: 95 BPM | HEIGHT: 62 IN | RESPIRATION RATE: 18 BRPM | TEMPERATURE: 98.1 F | OXYGEN SATURATION: 98 % | DIASTOLIC BLOOD PRESSURE: 59 MMHG | SYSTOLIC BLOOD PRESSURE: 93 MMHG | BODY MASS INDEX: 36.14 KG/M2

## 2018-11-26 DIAGNOSIS — J02.9 SORE THROAT: ICD-10-CM

## 2018-11-26 DIAGNOSIS — Z3A.28 28 WEEKS GESTATION OF PREGNANCY: Primary | ICD-10-CM

## 2018-11-26 DIAGNOSIS — Z23 ENCOUNTER FOR IMMUNIZATION: ICD-10-CM

## 2018-11-26 LAB
BILIRUB UR QL STRIP: NORMAL
GLUCOSE UR-MCNC: NEGATIVE MG/DL
KETONES P FAST UR STRIP-MCNC: NEGATIVE MG/DL
PH UR STRIP: 7.5 [PH] (ref 4.6–8)
PROT UR QL STRIP: NORMAL
S PYO AG THROAT QL: POSITIVE
SP GR UR STRIP: 1.01 (ref 1–1.03)
UA UROBILINOGEN AMB POC: NORMAL (ref 0.2–1)
URINALYSIS CLARITY POC: CLEAR
URINALYSIS COLOR POC: NORMAL
URINE BLOOD POC: NEGATIVE
URINE LEUKOCYTES POC: NEGATIVE
URINE NITRITES POC: NEGATIVE
VALID INTERNAL CONTROL?: YES

## 2018-11-26 RX ORDER — AMOXICILLIN 500 MG/1
500 CAPSULE ORAL 2 TIMES DAILY
Qty: 20 CAP | Refills: 0 | Status: SHIPPED | OUTPATIENT
Start: 2018-11-26 | End: 2018-12-06

## 2018-11-26 NOTE — PROGRESS NOTES
I reviewed with the resident the medical history and the resident's findings on the physical examination. I discussed with the resident the patient's diagnosis and concur with the plan. Nicolas Taylor is a 33 y.o. female  at 30w6d. presents for routine PNC. 2019, by Ultrasound  Concerns addressed: Routine Visit. URI (sore throat and cough)  Pregnancy complicated by:  ICP, passed glucose tolerance (initial). Tdap today. Denies VB, LOF, Contractions. + Fetal movement. Weight gain reviewed. RTC in 2 weeks. Visit Vitals  BP 93/59 (BP 1 Location: Right arm, BP Patient Position: Sitting)   Pulse 95   Temp 98.1 °F (36.7 °C) (Oral)   Resp 18   Ht 5' 2\" (1.575 m)   Wt 196 lb 6.4 oz (89.1 kg)   LMP 2018   SpO2 98%   BMI 35.92 kg/m²     FHT: 130s  FH: 30cm    Recent Results (from the past 24 hour(s))   AMB POC URINALYSIS DIP STICK AUTO W/O MICRO    Collection Time: 18  1:20 PM   Result Value Ref Range    Color (UA POC) Asya     Clarity (UA POC) Clear     Glucose (UA POC) Negative Negative    Bilirubin (UA POC) 1+ Negative    Ketones (UA POC) Negative Negative    Specific gravity (UA POC) 1.015 1.001 - 1.035    Blood (UA POC) Negative Negative    pH (UA POC) 7.5 4.6 - 8.0    Protein (UA POC) Trace Negative    Urobilinogen (UA POC) 1 mg/dL 0.2 - 1    Nitrites (UA POC) Negative Negative    Leukocyte esterase (UA POC) Negative Negative         OB Labs reviewed. Brief Summary Below. Lab Results   Component Value Date/Time    Rubella, External immune 2015    GrBStrep, External negative 2015    HBsAg, External negative 2015    HIV, External non reactive 2015    T.  Pallidum Antibody, External Negative 2013    Gonorrhea, External Negative 2013    Chlamydia, External Negative 2013    GTT, 60 min., External 108 2015    GTT, 180 min., External 9 2015

## 2018-11-26 NOTE — PATIENT INSTRUCTIONS
Semanas 26 a 30 de garcía embarazo: Instrucciones de cuidado - [ Wyona Natalya 26 to 30 of Your Pregnancy: Care Instructions ]  Instrucciones de cuidado    Ahora usted se encuentra en el último trimestre del [employer]. García bebé está creciendo rápidamente. Y es probable que sienta que garcía bebé se mueve con más frecuencia. Es posible que garcía médico le diga que cuente la cantidad de patadas que da garcía bebé. La espalda puede dolerle mientras garcía cuerpo se acostumbra al tamaño y a la longitud de garcía bebé. Si todavía no le tobias aplicado la vacuna Tdap (tétanos, difteria y tos [place]) lee scott [employer], hable con garcía médico acerca de aplicársela. [place] Risk a proteger a garcía recién nacido contra la infección por tos ferina. Lee scott tiempo, es importante que se cuide y preste atención a lo que garcía cuerpo necesita. Si tiene [employer], busque formas de estar con garcía liudmila que satisfagan garcía nivel de comodidad y deseo. Utilice las recomendaciones proporcionadas en esta hoja de cuidados para encontrar garcía propia manera de vivir la sexualidad. La atención de seguimiento es lazarus parte clave de garcía tratamiento y seguridad. Asegúrese de hacer y acudir a todas las citas, y llame a garcía médico si está teniendo problemas. También es lazarus buena idea saber los resultados de trent exámenes y mantener lazarus lista de los medicamentos que terrie. ¿Cómo puede cuidarse en el hogar? [place] garcía trabajo con calma  · Tómese descansos frecuentes. Si es posible, deje de trabajar cuando esté cansada y descanse lee la hora del almuerzo. · Vaya al baño cada 2 horas. · Cambie de posición con frecuencia. Si está sentada lee mucho tiempo, póngase de pie y camine. · Si está serrato lee mucho tiempo, apoye un pie sobre un banco bajo, flexionando la rodilla. Después de estar serrato lee mucho tiempo, siéntese con los pies elevados.   · Evite las [place], las sustancias químicas y el humo del tabaco.  Viva garcía sexualidad a garcía manera  · [employer] sexuales lee el [employer], a menos que garcía médico le diga que no. · Podría tener un gran deseo sexual o estar completamente desinteresada. · El abdomen cada vez más stephen podría hacer difícil encontrar lazarus buena posición lee el coito. Experimente y explore. · Cuando garcía liudmila le toque los senos, podría tener contracciones en [place]. · Un masaje en la espalda podría aliviar el dolor de espalda o los cólicos que a veces se sienten después del [place]. Aprenda sobre el trabajo de parto prematuro  · Esté alerta a las señales del trabajo de parto prematuro. Es posible que esté comenzando el [place] de parto si:  ? Tiene cólicos similares a los del período menstrual, con o sin náuseas. ? Tiene aproximadamente 4 contracciones o más en 20 minutos, o alrededor de 8 o más en 1 hora, incluso después de jose f tomado un vaso de agua y estar en reposo. ? Tiene un dolor sordo (leve cristina continuo) en la nguyen baja de la espalda que no desaparece cuando cambia de posición. ? Siente dolor o presión en la pelvis que aparece y desaparece con determinada regularidad. ? Tiene espasmos intestinales o síntomas similares a los de la gripe, con o sin diarrea. ? Nota un aumento o un cambio en el flujo vaginal. El flujo podría ser [place], tener consistencia mucosa, ser Bhargavi Duvall rastros de [place]. ? Se le rompe la mona. · Si kaylynn que ha comenzado un trabajo de parto prematuro:  ? [place] 2 o 3 vasos de agua o jugo. No beber suficientes líquidos puede provocar contracciones. ? Detenga lo que esté haciendo, y vacíe la vejiga. Luego, acuéstese sobre el lado ana lee al menos 1 hora. ? Mientras descansa de ana, ubique garcía esternón. Coloque los dedos en el punto blando donavan debajo de él. Mueva los dedos hacia abajo en dirección al ombligo para encontrar la parte superior del [place]. Compruebe si está tenso. ? Las contracciones pueden ser débiles o intensas.  Registre trent contracciones lee [employer] hora. Eugenie Norma contracción desde el comienzo de lazarus hasta el comienzo de [place]. ? Denise Celsoh contracciones sarah que no ocurren con la regularidad de un patrón reciben el nombre de contracciones de [place]. Estas son \"contracciones de práctica\", cristina no indican el inicio del [place] de [place]. Por lo general, se detienen si cambia de [place]. ? Si tiene contracciones regulares, llame a garcía médico.  ¿Dónde puede encontrar más información en inglés? Michelle Munoz a http://negrita-loren.info/. Darling Benitez R688 en la búsqueda para aprender más acerca de \"Semanas 26 a 30 de garcía embarazo: Instrucciones de cuidado - [ Mertha Plana 26 to 30 of Your Pregnancy: Care Instructions ]. \"  Revisado: 21 noviembre, 2017  Versión del contenido: 11.8  © 3042-3172 Healthwise, Incorporated. Las instrucciones de cuidado fueron adaptadas bajo licencia por [employer] (which disclaims liability or warranty for this information). Si usted tiene [place] afección médica o sobre estas instrucciones, siempre pregunte a garcía profesional de jean-pierre. Healthwise, Incorporated niega toda garantía o responsabilidad por garcía uso de esta información.

## 2018-11-26 NOTE — PROGRESS NOTES
Return OB Visit       Subjective:   Salome Tejada 33 y.o.   At 28w5d by  Ultrasound.  Pregnancy complicated by history of intrahepatic cholestasis and gestational diabetes in prior pregnancy. At this time Mrs. Wylie complains of sore throat and cough. On Saturday presented with productive cough (green sputum with streaks of blood) associated with sore throat. Denies SOB or fever.  and children have recently been sick with similar symptoms. States she does not have abdominal pain  , chest pain, contractions, fever, headache , nausea and vomiting, right upper quadrant pain  , swelling, vaginal bleeding  and vaginal leaking of fluid . Diet: well balanced, healthy   Water intake: adequate   Prenatal Vitamins: taking    swab and culture, at the same time  She is feeling her baby move. She denies vaginal bleeding, discharge or loss of fluid. She denies nausea, vomiting, severe abdominal pain or cramping. She denies dysuria. She denies headaches, dizziness or vision changes. She denies excessive swelling of extremities. Past Medical History - Reviewed today  Patient Active Problem List   Diagnosis Code    Obesity (BMI 30.0-34. 9) E66.9    Poor weight gain of pregnancy O26.10    Cholestasis of pregnancy in third trimester O26.613, K83.1    Elevated transaminase level R74.0    Intrahepatic cholestasis of pregnancy O26.619, K83.1    Maternal obesity affecting pregnancy, antepartum O99.210    Acid reflux K21.9    Constipation in pregnancy O99.619, K59.00         Medications - Reviewed today  Current Outpatient Medications   Medication Sig Dispense Refill    docusate sodium (COLACE) 100 mg capsule Take 1 Cap by mouth two (2) times a day for 90 days. 60 Cap 2    raNITIdine (ZANTAC) 150 mg tablet Take 2 Tabs by mouth two (2) times a day. 30 Tab 1    pyridoxine, vitamin B6, (VITAMIN B-6) 25 mg tablet Take 1 Tab by mouth three (3) times daily as needed.  90 Tab 1    prenatal vit no.112-folate no6 1 mg chew Take 1 Tab by mouth daily. 90 Tab 3    psyllium seed-sucrose (METAMUCIL, SUGAR,) powd Take 1 teaspoon three times a day with meals. 300 g 1    polyethylene glycol (MIRALAX) 17 gram packet Take 1 Packet by mouth daily as needed.  30 Each 0         Allergies - Reviewed today  No Known Allergies      Family History - Reviewed today  Family History   Problem Relation Age of Onset    Diabetes Mother          Social History - Reviewed today  Social History     Socioeconomic History    Marital status: SINGLE     Spouse name: Not on file    Number of children: Not on file    Years of education: Not on file    Highest education level: Not on file   Social Needs    Financial resource strain: Not on file    Food insecurity - worry: Not on file    Food insecurity - inability: Not on file   [employer] needs - medical: Not on file   [employer] needs - non-medical: Not on file   Occupational History    Not on file   Tobacco Use    Smoking status: Never Smoker    Smokeless tobacco: Never Used   Substance and Sexual Activity    Alcohol use: No    Drug use: No    Sexual activity: Yes     Partners: Male     Birth control/protection: None, Condom   Other Topics Concern    Not on file   Social History Narrative    ** Merged History Encounter **         ** Merged History Encounter **            Health Maintenance - Reviewed today   Immunizations:     -Influenza: 10/5/2018     -Tdap: Will receive today    Screening:     -Pap smear: 7/2/18 NEGATIVE FOR INTRAEPITHELIAL LESION OR MALIGNANCY       Objective:     Visit Vitals  BP 93/59 (BP 1 Location: Right arm, BP Patient Position: Sitting)   Pulse 95   Temp 98.1 °F (36.7 °C) (Oral)   Resp 18   Ht 5' 2\" (1.575 m)   Wt 196 lb 6.4 oz (89.1 kg)   LMP 04/13/2018   SpO2 98%   BMI 35.92 kg/m²       Physical Exam:  GENERAL APPEARANCE: alert, well appearing, in no apparent distress  HEENT: NCAT, b/l tympanic membrane intact, tonsils erythematous with moderate exudate. LUNGS: clear to auscultation, no wheezes, rales or rhonchi, symmetric air entry decreased breath sounds on the right  HEART: regular rate and rhythm, no murmurs  ABDOMEN: FHT present, 135 bpm  BACK: no CVA tenderness  UTERUS: gravid and consistent with 30cm  EXTREMITIES: no redness or tenderness in the calves or thighs, no edema  NEUROLOGICAL: alert, oriented, normal speech, no focal findings or movement disorder noted  PELVIC: examination not indicated. Assessment         ICD-10-CM ICD-9-CM    1. 28 weeks gestation of pregnancy Z3A.28 V22.2 AMB POC URINALYSIS DIP STICK AUTO W/O MICRO     This is a 33 y.o.  at 28w5d by ultrasound,complicated by history of intrahepatic cholestasis and gestational diabetes in prior pregnancy in setting of strep pharyngitis. Plan     · SIUP at  28w5d   - U/A unremarkable. - Feels fetal movement. - 3 hr glucose tolerance test negative for gestational diabetes. - Administered Tdap vaccine         - Follow up in 4 weeks  · Strep pharyngitis: Rapid strep test positive.         -Amoxicillin 500 mg BID for 10 days. Orders Placed This Encounter    AMB POC URINALYSIS DIP STICK AUTO W/O MICRO         Labor precautions discussed, including: Regular painful contractions, lasting for greater than one hour, taking your breath away; any vaginal bleeding; any leakage of fluid; or absent or decreased fetal movement. Call M.D. on call if any of these symptoms or signs occur. I have discussed the diagnosis with the patient and the intended plan as seen in the above orders. The patient has received an after-visit summary and questions were answered concerning future plans. I have discussed medication side effects and warnings with the patient as well. Patient discussed with Dr. Kayla Mcardle (attending physician).      Soumya Steel MD  Family Medicine Resident

## 2018-11-26 NOTE — PROGRESS NOTES
Identified Patient with two Patient identifiers (Name and ). Two Patient Identifiers confirmed. Reviewed record in preparation for visit and have obtained necessary documentation. Chief Complaint   Patient presents with    Routine Prenatal Visit       28 Weeks 5 Days - c/o lower abdominal cramping off and on for about 3 weeks    Cold Symptoms     c/o cough sore throat and nasal congestion x 2 days      Patient in the office to for routine prenatal appointment today. Patient denies vaginal discharge, abnormal vaginal spotting/bleeding or fluid leakage. Patient also denies abdominal pain/cramping/discomfort or contractions. States she is compliant with taking prenatal vitamins as prescribed. No further concerns voiced at this time     Visit Vitals  BP 93/59 (BP 1 Location: Right arm, BP Patient Position: Sitting)   Pulse 95   Temp 98.1 °F (36.7 °C) (Oral)   Resp 18   Ht 5' 2\" (1.575 m)   Wt 196 lb 6.4 oz (89.1 kg)   SpO2 98%   BMI 35.92 kg/m²       1. Have you been to the ER, urgent care clinic since your last visit? Hospitalized since your last visit? No    2. Have you seen or consulted any other health care providers outside of the [address] [place] [address] since your last visit? Include any pap smears or colon screening.  No

## 2018-11-30 ENCOUNTER — TELEPHONE (OUTPATIENT)
Dept: FAMILY MEDICINE CLINIC | Age: 28
End: 2018-11-30

## 2018-11-30 NOTE — TELEPHONE ENCOUNTER
Per call from [facility],  [phone]    Asking that dosage be verified as patient provided with 30 day supply      raNITIdine (ZANTAC) 150 mg tablet [[id number]]   Order Details   Dose: 300 mg Route: Oral Frequency: 2 TIMES DAILY   Indications of Use: Heartburn   Dispense Quantity: 30 Tab Refills: 1 Fills remaining: --           Sig: Take 2 Tabs by mouth two (2) times a day.          Written Date: 10/31/18 Expiration Date: --     Start Date: 10/31/18 End Date: --            Ordering Provider:  -- LEANDRO #:  -- NPI:  --    Authorizing Provider:  Kendra Bateman MD LEANDRO #:  [id number]  NPI:  [id number]    Ordering User:  Kendra Bateman MD               Pharmacy:  [facility]/pharmacy #[id number] [address] LEANDRO #:  [id number]

## 2018-11-30 NOTE — TELEPHONE ENCOUNTER
----- Message from Tran Travis sent at 11/30/2018 11:01 AM EST -----  Regarding: Dr. Donell Gordon telephone   Pt's sister Rebeca Duarte has requesting a call back in regards to pt itching per doctor request. Dania Pierce contact [phone]

## 2018-12-01 ENCOUNTER — HOSPITAL ENCOUNTER (EMERGENCY)
Age: 28
Discharge: HOME | End: 2018-12-02
Attending: OBSTETRICS & GYNECOLOGY | Admitting: OBSTETRICS & GYNECOLOGY
Payer: SUBSIDIZED

## 2018-12-01 PROCEDURE — 80307 DRUG TEST PRSMV CHEM ANLYZR: CPT

## 2018-12-01 PROCEDURE — 75810000275 HC EMERGENCY DEPT VISIT NO LEVEL OF CARE

## 2018-12-01 RX ORDER — ACETAMINOPHEN 325 MG/1
650 TABLET ORAL
Status: COMPLETED | OUTPATIENT
Start: 2018-12-02 | End: 2018-12-02

## 2018-12-02 VITALS
RESPIRATION RATE: 22 BRPM | HEART RATE: 75 BPM | HEIGHT: 62 IN | BODY MASS INDEX: 35.88 KG/M2 | WEIGHT: 195 LBS | TEMPERATURE: 98 F | SYSTOLIC BLOOD PRESSURE: 98 MMHG | OXYGEN SATURATION: 98 % | DIASTOLIC BLOOD PRESSURE: 57 MMHG

## 2018-12-02 LAB
AMPHET UR QL SCN: NEGATIVE
BARBITURATES UR QL SCN: NEGATIVE
BENZODIAZ UR QL: NEGATIVE
CANNABINOIDS UR QL SCN: NEGATIVE
COCAINE UR QL SCN: NEGATIVE
DRUG SCRN COMMENT,DRGCM: NORMAL
METHADONE UR QL: NEGATIVE
OPIATES UR QL: NEGATIVE
PCP UR QL: NEGATIVE

## 2018-12-02 PROCEDURE — 74011250637 HC RX REV CODE- 250/637: Performed by: OBSTETRICS & GYNECOLOGY

## 2018-12-02 PROCEDURE — 59025 FETAL NON-STRESS TEST: CPT

## 2018-12-02 PROCEDURE — 99285 EMERGENCY DEPT VISIT HI MDM: CPT

## 2018-12-02 RX ADMIN — ACETAMINOPHEN 650 MG: 325 TABLET, FILM COATED ORAL at 00:05
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
## 2018-12-04 ENCOUNTER — ROUTINE PRENATAL (OUTPATIENT)
Dept: FAMILY MEDICINE CLINIC | Age: 28
End: 2018-12-04

## 2018-12-04 VITALS
RESPIRATION RATE: 18 BRPM | HEART RATE: 68 BPM | WEIGHT: 197.2 LBS | BODY MASS INDEX: 36.29 KG/M2 | OXYGEN SATURATION: 100 % | TEMPERATURE: 98 F | DIASTOLIC BLOOD PRESSURE: 62 MMHG | SYSTOLIC BLOOD PRESSURE: 103 MMHG | HEIGHT: 62 IN

## 2018-12-04 DIAGNOSIS — Z34.90 PREGNANCY, UNSPECIFIED GESTATIONAL AGE: Primary | ICD-10-CM

## 2018-12-04 LAB
BILIRUB UR QL STRIP: NORMAL
GLUCOSE UR-MCNC: NEGATIVE MG/DL
KETONES P FAST UR STRIP-MCNC: NEGATIVE MG/DL
PH UR STRIP: 7 [PH] (ref 4.6–8)
PROT UR QL STRIP: NEGATIVE
SP GR UR STRIP: 1.01 (ref 1–1.03)
UA UROBILINOGEN AMB POC: NORMAL (ref 0.2–1)
URINALYSIS CLARITY POC: NORMAL
URINALYSIS COLOR POC: NORMAL
URINE BLOOD POC: NEGATIVE
URINE LEUKOCYTES POC: NEGATIVE
URINE NITRITES POC: NEGATIVE
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
## 2018-12-04 NOTE — PATIENT INSTRUCTIONS
Semanas 26 a 30 de garcía embarazo: Después de la [place]  [Weeks 26 to 27 of Your Pregnancy: After Your Visit]  Instrucciones de cuidado  Ahora usted se encuentra en el último trimestre del Regan Pietro. García bebé está creciendo rápidamente. Y es probable que sienta que garcía bebé se mueve con más frecuencia. La espalda puede dolerle mientras garcía cuerpo se acostumbra al tamaño y longitud de garcía bebé. Si todavía no le tobias aplicado la vacuna Tdap (tétanos, difteria y tos [place]) lee scott Bernadine Pietro, hable con garcía médico acerca de aplicársela. Janeece Murders a proteger a garcía recién nacido contra la infección por tos ferina. Lee scott tiempo, es importante que se cuide y preste atención a lo que garcía cuerpo necesita. Si tiene [employer], busque formas de estar con garcía liudmila que satisfagan garcía nivel de comodidad y deseo. Utilice las recomendaciones proporcionadas en esta hoja de cuidados para encontrar garcía manera de vivir garcía sexualidad. La atención de seguimiento es lazarus parte clave de garcía tratamiento y seguridad. Asegúrese de hacer y acudir a todas las citas, y llame a garcía médico si está teniendo problemas. También es lazarus buena idea saber los resultados de los exámenes y mantener lazarus lista de los medicamentos que terrie. ¿Cómo puede cuidarse en el hogar? [place] garcía trabajo con calma  · Tómese descansos frecuentes. Si es posible, deje de trabajar cuando esté cansada y descanse lee la hora del almuerzo. · Vaya al baño cada 2 horas. · Cambie de posición con frecuencia. Si está sentada lee mucho tiempo, párese y camine. · Si está serrato lee mucho tiempo, apoye un pie sobre un banco bajo, flexionando la rodilla. Después de estar serrato lee mucho tiempo, siéntese con los pies elevados. · Evite los gases, los productos químicos y el humo del tabaco.  Viva garcía sexualidad a garcía manera  · [employer] relaciones sexuales lee el embarazo está samuel, a menos que garcía médico le diga que no.   · Podría tener un gran deseo sexual o estar completamente desinteresada. · El vientre cada vez más stephen podría hacer difícil encontrar lazarus buena posición lee la relación sexual. Experimente y explore. · Cuando garcía liudmila le toque los senos, podría tener contracciones en [place]. · Un masaje en la espalda podría aliviar el dolor de espalda o los roscoe que a veces siente después del [place]. Aprenda sobre el trabajo de parto prematuro  · Esté alerta a las señales de trabajo de parto prematuro. Usted podría entrar en trabajo de parto si:  ¨ Tiene cólicos similares a los del período menstrual, con o sin náuseas. ¨ Tiene aproximadamente 4 contracciones o más en 20 minutos, o alrededor de 8 o más en 1 hora, incluso después de jose f tomado un vaso de agua y estar en reposo. ¨ Tiene un dolor sordo (leve cristina continuo) en la nguyen baja de la espalda que no desaparece cuando cambia de posición. ¨ Siente dolor o presión en la pelvis que aparece y desaparece con determinada regularidad. ¨ Tiene espasmos intestinales o síntomas similares a los de la gripe, con o sin diarrea. ¨ Nota un aumento o un cambio en el flujo vaginal. El flujo podría ser [place] Prier, tener consistencia mucosa, ser [place] rastros de [place]. ¨ Se le rompe la mona. · Si kaylynn que ha comenzado un trabajo de parto prematuro:  Piper Yamilka al baño. ¨ Mary Anne 2 o 3 vasos de [place]. ¨ Recuéstese del lado ana. No se recueste boca arriba. ¨ Mientras descansa de costado, ubique garcía esternón. Coloque los dedos en el punto blando donavan debajo de él. Mueva los dedos hacia abajo en dirección al ombligo para encontrar la parte superior del [place]. Compruebe si está tenso. ¨ Las contracciones pueden ser débiles o intensas. Registre trent contracciones lee lazarus hora. Cuente lazarus contracción desde el comienzo de lazarus hasta el comienzo de [place]. ¨ Lazarus única contracción o varias que no ocurren con la regularidad de un patrón reciben el nombre de contracciones de [place].  Estas son \"contracciones de práctica\", cristina no indican el inicio del trabajo de [place]. Por lo general, se detienen si cambia de [place]. ¨ Si tiene contracciones regulares, llame a garcía médico.   ¿Dónde puede encontrar más información en inglés? Gleda Amelia a [employer].be  Joshe Santiago G884 en la búsqueda para aprender más acerca de \"Semanas 26 a 30 de garcía embarazo: Después de la consulta. \"   © 4929-2814 Healthwise, [employer]. Instrucciones de cuidado adaptadas bajo licencia por Michelle Kathleen (which disclaims liability or warranty for this information). Estas instrucciones de cuidado son para usarlas con garcía profesional clínico registrado. Si tiene preguntas acerca de lazarus afección médica o de estas instrucciones, pregunte siempre a garcía profesional de [employer]. Healthwise, Incorporated niega cualquier garantía o responsabilidad por garcía uso de esta información.   Versión del contenido: [phone]; Última revisión: 16 julio, 2013
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
## 2018-12-04 NOTE — PROGRESS NOTES
Elyse Brown is a 33 y.o. female  Chief Complaint   Patient presents with    Routine Prenatal Visit     Visit Vitals  /62 (BP 1 Location: Left arm, BP Patient Position: Sitting)   Pulse 68   Temp 98 °F (36.7 °C) (Oral)   Resp 18   Ht 5' 2\" (1.575 m)   Wt 197 lb 3.2 oz (89.4 kg)   LMP 04/13/2018   SpO2 100%   BMI 36.07 kg/m²     1. Have you been to the ER, urgent care clinic since your last visit? Hospitalized since your last visit? No    2. Have you seen or consulted any other health care providers outside of the [address] [place] [address] since your last visit? Include any pap smears or colon screening. No  There are no preventive care reminders to display for this patient.

## 2018-12-05 ENCOUNTER — LAB ONLY (OUTPATIENT)
Dept: FAMILY MEDICINE CLINIC | Age: 28
End: 2018-12-05

## 2018-12-05 DIAGNOSIS — Z34.90 PREGNANCY, UNSPECIFIED GESTATIONAL AGE: ICD-10-CM

## 2018-12-05 RX ORDER — RANITIDINE 150 MG/1
300 TABLET, FILM COATED ORAL 2 TIMES DAILY
Qty: 60 TAB | Refills: 3 | Status: SHIPPED | OUTPATIENT
Start: 2018-12-05 | End: 2019-09-28

## 2018-12-06 LAB
ALBUMIN SERPL-MCNC: 3.2 G/DL (ref 3.5–5.5)
ALBUMIN/GLOB SERPL: 1.1 {RATIO} (ref 1.2–2.2)
ALP SERPL-CCNC: 133 IU/L (ref 39–117)
ALT SERPL-CCNC: 79 IU/L (ref 0–32)
AST SERPL-CCNC: 64 IU/L (ref 0–40)
BILE AC SERPL-SCNC: 12.5 UMOL/L (ref 4.7–24.5)
BILIRUB SERPL-MCNC: 0.3 MG/DL (ref 0–1.2)
BUN SERPL-MCNC: 6 MG/DL (ref 6–20)
BUN/CREAT SERPL: 11 (ref 9–23)
CALCIUM SERPL-MCNC: 8.9 MG/DL (ref 8.7–10.2)
CHLORIDE SERPL-SCNC: 104 MMOL/L (ref 96–106)
CO2 SERPL-SCNC: 22 MMOL/L (ref 20–29)
CREAT SERPL-MCNC: 0.53 MG/DL (ref 0.57–1)
GLOBULIN SER CALC-MCNC: 2.8 G/DL (ref 1.5–4.5)
GLUCOSE SERPL-MCNC: 85 MG/DL (ref 65–99)
POTASSIUM SERPL-SCNC: 3.8 MMOL/L (ref 3.5–5.2)
PROT SERPL-MCNC: 6 G/DL (ref 6–8.5)
SODIUM SERPL-SCNC: 139 MMOL/L (ref 134–144)

## 2018-12-10 ENCOUNTER — ROUTINE PRENATAL (OUTPATIENT)
Dept: FAMILY MEDICINE CLINIC | Age: 28
End: 2018-12-10

## 2018-12-10 VITALS
DIASTOLIC BLOOD PRESSURE: 60 MMHG | TEMPERATURE: 97.6 F | HEIGHT: 62 IN | BODY MASS INDEX: 36.07 KG/M2 | SYSTOLIC BLOOD PRESSURE: 95 MMHG | RESPIRATION RATE: 20 BRPM | OXYGEN SATURATION: 99 % | WEIGHT: 196 LBS | HEART RATE: 80 BPM

## 2018-12-10 DIAGNOSIS — Z87.19 HISTORY OF CHOLESTASIS DURING PREGNANCY: ICD-10-CM

## 2018-12-10 DIAGNOSIS — Z3A.30 30 WEEKS GESTATION OF PREGNANCY: Primary | ICD-10-CM

## 2018-12-10 DIAGNOSIS — Z87.59 HISTORY OF CHOLESTASIS DURING PREGNANCY: ICD-10-CM

## 2018-12-10 DIAGNOSIS — O99.210 MATERNAL OBESITY AFFECTING PREGNANCY, ANTEPARTUM: ICD-10-CM

## 2018-12-10 LAB
BILIRUB UR QL STRIP: NEGATIVE
GLUCOSE UR-MCNC: NEGATIVE MG/DL
KETONES P FAST UR STRIP-MCNC: NEGATIVE MG/DL
PH UR STRIP: 7.5 [PH] (ref 4.6–8)
PROT UR QL STRIP: NEGATIVE
SP GR UR STRIP: 1.01 (ref 1–1.03)
UA UROBILINOGEN AMB POC: NORMAL (ref 0.2–1)
URINALYSIS CLARITY POC: CLEAR
URINALYSIS COLOR POC: NORMAL
URINE BLOOD POC: NEGATIVE
URINE LEUKOCYTES POC: NORMAL
URINE NITRITES POC: NEGATIVE

## 2018-12-10 NOTE — PROGRESS NOTES
Chief Complaint   Patient presents with    Routine Prenatal Visit     30w5d     1. Have you been to the ER, urgent care clinic since your last visit? Hospitalized since your last visit? No    2. Have you seen or consulted any other health care providers outside of the [address] [place] [address] since your last visit? Include any pap smears or colon screening.  No

## 2018-12-10 NOTE — PATIENT INSTRUCTIONS
Colestasis del embarazo: Instrucciones de cuidado - [ Cholestasis of Pregnancy: Care Instructions ]  Instrucciones de cuidado    La colestasis del embarazo es un problema hepático. Hace que la piel pique mucho. Ocurre cuando la bilis no fluye muy samuel fuera del hígado. Scott problema no le causa ningún problema de jean-pierre grave a lazarus josefina [place]. [place] puede causarle problemas a garcía bebé. García médicoquerrá observarlos atentamente a usted y a garcía bebé. Para mantenerlos a ambos lo más saludables que sea posible, garcía médico podría recomendar que se adelante el parto. A veces, los médicos también recomiendan medicamentos. Los ácidos biliares pueden reducirse con medicamentos. Después de que nace el bebé, scott problema desaparece. La atención de seguimiento es lazarus parte clave de garcía tratamiento y seguridad. Asegúrese de hacer y acudir a todas las citas, y llame a garcía médico si está teniendo problemas. También es lazarus buena idea saber los resultados de trent exámenes y mantener lazarus lista de los medicamentos que terrie. ¿Cómo puede cuidarse en el hogar? · Sea gregor con los medicamentos. [place] trent medicamentos exactamente trey le fueron recetados. Llame a garcía médico si kaylynn estar teniendo un problema con garcía medicamento. · Si garcía médico se las receta, use cremas o pastillas para ayudar con la comezón. · Use loción de calamina en las zonas en las que tenga comezón. · No se dé duchas ni stephanie calientes. [place] puede empeorar la comezón. ¿Cuándo debe pedir ayuda? Llame a garcía médico ahora mismo o busque atención médica inmediata si:    · García comezón empeora o empieza a tener otros síntomas.     · Piensa que está en trabajo de parto.     · Tiene un color amarillento en la piel o en la parte brianna de los ojos que es nuevo o que está aumentando.    Preste especial atención a los cambios en garcía jean-pierre y asegúrese de comunicarse con garcía médico si tiene cualquier pregunta o inquietud.   ¿Dónde puede encontrar más información en judilés? Simeon doty http://negrita-loren.info/. Marlyn Countess H088 en la búsqueda para aprender más acerca de \"Colestasis del Oralia: Instrucciones de cuidado - [ Cholestasis of Pregnancy: Care Instructions ]. \"  Revisado: 21 noviembre, 2017  Versión del contenido: 11.8  © 9915-4646 Healthwise, [employer]. Las instrucciones de cuidado fueron adaptadas bajo licencia por [employer] (which disclaims liability or warranty for this information). Si usted tiene [place] afección médica o sobre estas instrucciones, siempre pregunte a garcía profesional de jean-pierre. [employer], [employer] niega toda garantía o responsabilidad por garcía uso de esta información.
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
## 2018-12-12 ENCOUNTER — TELEPHONE (OUTPATIENT)
Dept: FAMILY MEDICINE CLINIC | Age: 28
End: 2018-12-12

## 2018-12-12 LAB
ALBUMIN SERPL-MCNC: 3.4 G/DL (ref 3.5–5.5)
ALBUMIN/GLOB SERPL: 1.2 {RATIO} (ref 1.2–2.2)
ALP SERPL-CCNC: 143 IU/L (ref 39–117)
ALT SERPL-CCNC: 110 IU/L (ref 0–32)
AST SERPL-CCNC: 72 IU/L (ref 0–40)
BILE AC SERPL-SCNC: 13.4 UMOL/L (ref 4.7–24.5)
BILIRUB SERPL-MCNC: 0.2 MG/DL (ref 0–1.2)
BUN SERPL-MCNC: 7 MG/DL (ref 6–20)
BUN/CREAT SERPL: 14 (ref 9–23)
CALCIUM SERPL-MCNC: 8.8 MG/DL (ref 8.7–10.2)
CHLORIDE SERPL-SCNC: 103 MMOL/L (ref 96–106)
CO2 SERPL-SCNC: 20 MMOL/L (ref 20–29)
CREAT SERPL-MCNC: 0.49 MG/DL (ref 0.57–1)
GLOBULIN SER CALC-MCNC: 2.9 G/DL (ref 1.5–4.5)
GLUCOSE SERPL-MCNC: 129 MG/DL (ref 65–99)
POTASSIUM SERPL-SCNC: 3.8 MMOL/L (ref 3.5–5.2)
PROT SERPL-MCNC: 6.3 G/DL (ref 6–8.5)
SODIUM SERPL-SCNC: 141 MMOL/L (ref 134–144)

## 2018-12-12 NOTE — TELEPHONE ENCOUNTER
----- Message from Osvaldo De Los Santos DO sent at 12/12/2018 11:35 AM EST -----  Julio Abdul this patient needs to come back next week for OB follow up. Could you call and scheduled, please try to find space with me, thanks!   MV

## 2018-12-13 LAB
HAV IGM SERPL QL IA: NEGATIVE
HBV CORE IGM SERPL QL IA: NEGATIVE
HBV SURFACE AG SERPL QL IA: NEGATIVE
HCV AB S/CO SERPL IA: <0.1 S/CO RATIO (ref 0–0.9)
SPECIMEN STATUS REPORT, ROLRST: NORMAL

## 2018-12-14 ENCOUNTER — TELEPHONE (OUTPATIENT)
Dept: FAMILY MEDICINE CLINIC | Age: 28
End: 2018-12-14

## 2018-12-14 NOTE — TELEPHONE ENCOUNTER
Spoke with patient and scheduled an appointment for next Friday with Dr. Savannah Butler. Patient verbalized understanding.

## 2018-12-14 NOTE — TELEPHONE ENCOUNTER
----- Message from Jorge Alberto Carpenter sent at 12/14/2018 11:08 AM EST -----  Regarding: Vest/telephone  Pt is requesting for you to call her in regard to scheduling an ultra sound at [facility] does speak Eritrean. Pts number is [phone].

## 2018-12-21 ENCOUNTER — ROUTINE PRENATAL (OUTPATIENT)
Dept: FAMILY MEDICINE CLINIC | Age: 28
End: 2018-12-21

## 2018-12-21 ENCOUNTER — TELEPHONE (OUTPATIENT)
Dept: FAMILY MEDICINE CLINIC | Age: 28
End: 2018-12-21

## 2018-12-21 VITALS
OXYGEN SATURATION: 100 % | TEMPERATURE: 98.3 F | RESPIRATION RATE: 16 BRPM | WEIGHT: 194 LBS | HEIGHT: 62 IN | HEART RATE: 67 BPM | SYSTOLIC BLOOD PRESSURE: 104 MMHG | BODY MASS INDEX: 35.7 KG/M2 | DIASTOLIC BLOOD PRESSURE: 62 MMHG

## 2018-12-21 DIAGNOSIS — Z87.59 HISTORY OF CHOLESTASIS DURING PREGNANCY: ICD-10-CM

## 2018-12-21 DIAGNOSIS — O09.90 SUPERVISION OF HIGH RISK PREGNANCY, ANTEPARTUM: Primary | ICD-10-CM

## 2018-12-21 DIAGNOSIS — O99.210 MATERNAL OBESITY AFFECTING PREGNANCY, ANTEPARTUM: ICD-10-CM

## 2018-12-21 DIAGNOSIS — Z87.19 HISTORY OF CHOLESTASIS DURING PREGNANCY: ICD-10-CM

## 2018-12-21 LAB
BILIRUB UR QL STRIP: NEGATIVE
GLUCOSE UR-MCNC: NEGATIVE MG/DL
KETONES P FAST UR STRIP-MCNC: NORMAL MG/DL
PH UR STRIP: 7 [PH] (ref 4.6–8)
PROT UR QL STRIP: NEGATIVE
SP GR UR STRIP: 1.02 (ref 1–1.03)
UA UROBILINOGEN AMB POC: NORMAL (ref 0.2–1)
URINALYSIS CLARITY POC: CLEAR
URINALYSIS COLOR POC: YELLOW
URINE BLOOD POC: NORMAL
URINE LEUKOCYTES POC: NORMAL
URINE NITRITES POC: NEGATIVE

## 2018-12-21 NOTE — TELEPHONE ENCOUNTER
Patient calling, Used [employer]/agent id#     Patient states that she was told to contact [facility] to schedule ultrasound appt. Patient states she called and was told the office needed to call to schedule. Patient then asked if she had appt today and I verified that she did at 7:15 pm with Dr. Mateus Hurst. Patient asked what that visit was for and I stated prenatal appt.
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
## 2018-12-24 ENCOUNTER — LAB ONLY (OUTPATIENT)
Dept: FAMILY MEDICINE CLINIC | Age: 28
End: 2018-12-24

## 2018-12-24 DIAGNOSIS — O09.90 SUPERVISION OF HIGH RISK PREGNANCY, ANTEPARTUM: ICD-10-CM

## 2018-12-26 LAB
ALBUMIN SERPL-MCNC: 3.3 G/DL (ref 3.5–5.5)
ALBUMIN/GLOB SERPL: 1.2 {RATIO} (ref 1.2–2.2)
ALP SERPL-CCNC: 179 IU/L (ref 39–117)
ALT SERPL-CCNC: 138 IU/L (ref 0–32)
AST SERPL-CCNC: 104 IU/L (ref 0–40)
BILE AC SERPL-SCNC: 14.4 UMOL/L (ref 4.7–24.5)
BILIRUB SERPL-MCNC: 0.4 MG/DL (ref 0–1.2)
BUN SERPL-MCNC: 7 MG/DL (ref 6–20)
BUN/CREAT SERPL: 12 (ref 9–23)
CALCIUM SERPL-MCNC: 9.3 MG/DL (ref 8.7–10.2)
CHLORIDE SERPL-SCNC: 104 MMOL/L (ref 96–106)
CO2 SERPL-SCNC: 20 MMOL/L (ref 20–29)
CREAT SERPL-MCNC: 0.57 MG/DL (ref 0.57–1)
GLOBULIN SER CALC-MCNC: 2.7 G/DL (ref 1.5–4.5)
GLUCOSE SERPL-MCNC: 104 MG/DL (ref 65–99)
POTASSIUM SERPL-SCNC: 3.6 MMOL/L (ref 3.5–5.2)
PROT SERPL-MCNC: 6 G/DL (ref 6–8.5)
SODIUM SERPL-SCNC: 141 MMOL/L (ref 134–144)

## 2018-12-27 NOTE — PROGRESS NOTES
BA 14.4 which is 0.1 point from diagnostic of ICP. LFTs elevated. Symptomatic and hx of ICP. Referred to [facility].

## 2018-12-28 ENCOUNTER — ROUTINE PRENATAL (OUTPATIENT)
Dept: FAMILY MEDICINE CLINIC | Age: 28
End: 2018-12-28

## 2018-12-28 VITALS
WEIGHT: 194 LBS | RESPIRATION RATE: 22 BRPM | OXYGEN SATURATION: 98 % | DIASTOLIC BLOOD PRESSURE: 62 MMHG | TEMPERATURE: 98.3 F | HEART RATE: 83 BPM | HEIGHT: 62 IN | SYSTOLIC BLOOD PRESSURE: 94 MMHG | BODY MASS INDEX: 35.7 KG/M2

## 2018-12-28 DIAGNOSIS — Z87.19 HISTORY OF CHOLESTASIS DURING PREGNANCY: ICD-10-CM

## 2018-12-28 DIAGNOSIS — Z87.59 HISTORY OF CHOLESTASIS DURING PREGNANCY: ICD-10-CM

## 2018-12-28 DIAGNOSIS — L29.9 PRURITUS: ICD-10-CM

## 2018-12-28 DIAGNOSIS — Z3A.33 33 WEEKS GESTATION OF PREGNANCY: Primary | ICD-10-CM

## 2018-12-28 DIAGNOSIS — E66.9 OBESITY, UNSPECIFIED CLASSIFICATION, UNSPECIFIED OBESITY TYPE, UNSPECIFIED WHETHER SERIOUS COMORBIDITY PRESENT: ICD-10-CM

## 2018-12-28 LAB
BILIRUB UR QL STRIP: NORMAL
GLUCOSE UR-MCNC: NEGATIVE MG/DL
KETONES P FAST UR STRIP-MCNC: NORMAL MG/DL
PH UR STRIP: 7 [PH] (ref 4.6–8)
PROT UR QL STRIP: NEGATIVE
SP GR UR STRIP: 1.02 (ref 1–1.03)
UA UROBILINOGEN AMB POC: NORMAL (ref 0.2–1)
URINALYSIS CLARITY POC: CLEAR
URINALYSIS COLOR POC: NORMAL
URINE BLOOD POC: NEGATIVE
URINE LEUKOCYTES POC: NORMAL
URINE NITRITES POC: NEGATIVE

## 2018-12-28 RX ORDER — HYDROXYZINE HYDROCHLORIDE 10 MG/1
10 TABLET, FILM COATED ORAL
Qty: 30 TAB | Refills: 0 | Status: SHIPPED | OUTPATIENT
Start: 2018-12-28 | End: 2019-01-04

## 2018-12-28 NOTE — PROGRESS NOTES
Return OB Visit       Subjective:   Sanford Castillo 33 y.o.   ALEXX: 2019, by Ultrasound  GA:  33w2d. Complaining of itching and feeling warm. Denies vaginal bleeding, contractions, loss of fluid. Endorses good fetal movement. [facility] [id number]    Allergies- reviewed:   No Known Allergies    Medications- reviewed:   Current Outpatient Medications   Medication Sig    prenatal vit no.112-folate no6 1 mg chew Take 1 Tab by mouth daily.  raNITIdine (ZANTAC) 150 mg tablet Take 2 Tabs by mouth two (2) times a day.  docusate sodium (COLACE) 100 mg capsule Take 1 Cap by mouth two (2) times a day for 90 days.  pyridoxine, vitamin B6, (VITAMIN B-6) 25 mg tablet Take 1 Tab by mouth three (3) times daily as needed.  psyllium seed-sucrose (METAMUCIL, SUGAR,) powd Take 1 teaspoon three times a day with meals.  polyethylene glycol (MIRALAX) 17 gram packet Take 1 Packet by mouth daily as needed. No current facility-administered medications for this visit. Past Medical History- reviewed:  Past Medical History:   Diagnosis Date    Cholestasis of pregnancy in third trimester 2015    Constipation     Gestational diabetes 2015    Hypertension     possibly per pt report in [place]. Denied it 5/5/15     Past Surgical History- reviewed:   History reviewed. No pertinent surgical history.     Social History- reviewed:  Social History     Socioeconomic History    Marital status: SINGLE     Spouse name: Not on file    Number of children: Not on file    Years of education: Not on file    Highest education level: Not on file   Social Needs    Financial resource strain: Not on file    Food insecurity - worry: Not on file    Food insecurity - inability: Not on file   [employer] needs - medical: Not on file   [employer] needs - non-medical: Not on file   Occupational History    Not on file   Tobacco Use    Smoking status: Never Smoker    Smokeless tobacco: Never Used   Substance and Sexual Activity    Alcohol use: No    Drug use: No    Sexual activity: Yes     Partners: Male     Birth control/protection: None, Condom   Other Topics Concern    Not on file   Social History Narrative    ** Merged History Encounter **         ** Merged History Encounter **        Immunizations- reviewed:   Immunization History   Administered Date(s) Administered    Influenza Vaccine (Quad) PF 10/02/2015, 10/05/2018    Influenza Vaccine Split 10/17/2012    Tdap 09/04/2015, 11/26/2018     Objective:     Visit Vitals  BP 94/62   Pulse 83   Temp 98.3 °F (36.8 °C) (Oral)   Resp 22   Ht 5' 2\" (1.575 m)   Wt 194 lb (88 kg)   LMP 04/13/2018   SpO2 98%   BMI 35.48 kg/m²       Physical Exam:  GENERAL APPEARANCE: alert, well appearing, in no apparent distress  ABDOMEN: soft, nontender, nondistended, no abnormal masses, no epigastric pain, bowel sounds present, fundal height 35 cm, FHT present at 140 bpm  BACK: no CVA tenderness  UTERUS: gravid  EXTREMITIES: no redness or tenderness in the calves or thighs, no edema  NEUROLOGICAL: alert, oriented, normal speech, no focal findings or movement disorder noted    Labs  UA shows 2+ bilirubin, trace LE  Recent Results (from the past 12 hour(s))   AMB POC URINALYSIS DIP STICK AUTO W/O MICRO    Collection Time: 12/28/18  1:17 PM   Result Value Ref Range    Color (UA POC) Dark Yellow     Clarity (UA POC) Clear     Glucose (UA POC) Negative Negative    Bilirubin (UA POC) 2+ Negative    Ketones (UA POC) Trace Negative    Specific gravity (UA POC) 1.020 1.001 - 1.035    Blood (UA POC) Negative Negative    pH (UA POC) 7.0 4.6 - 8.0    Protein (UA POC) Negative Negative    Urobilinogen (UA POC) 1 mg/dL 0.2 - 1    Nitrites (UA POC) Negative Negative    Leukocyte esterase (UA POC) Trace Negative     Assessment   Pregnancy at  33w2d       ICD-10-CM ICD-9-CM    1. 33 weeks gestation of pregnancy Z3A.33 V22.2 AMB POC URINALYSIS DIP STICK AUTO W/O MICRO Plan   IUP at 22G2A complicated by hx of IHCP in previous pregnancy, obesity, elevated tranaminases  PNL: O+, Ab screen neg, G/C neg, Rubella immune, HepBsAg neg, Tpal neg, HIV NR, AFP Tetra neg  - Hx of IHCP in previous pregnancy - previous bile acids 14.4 (threshold 14.5), Alk Phos 179, , . Repeat bile acids and CMP today as patient will likely meet threshold for IHCP. Will start Atarax 10mg take 1 tab PO TID PRN pruritis. - Obesity - A1c 5.3, 1hr OGTT 173, normal 3hr GTT  - Hx of UTI (enterocccus group D and proteus) with neg ROSS  - Flu 10/5/18 and Tdap 11/26/18  - 3rd trimester labs today  - Return 1/4/18 for next prenatal with Dr. Kari Luis This Encounter    AMB POC URINALYSIS DIP STICK AUTO W/O MICRO     Labor precautions discussed, including: Regular painful contractions, lasting for greater than one hour, taking your breath away; any vaginal bleeding; any leakage of fluid; or absent or decreased fetal movement. Call M.D. on call if any of these symptoms or signs occur. I have discussed the diagnosis with the patient and the intended plan as seen in the above orders. Patient verbalizes understanding of the treatment plan and agrees with the plan. The patient has received an after-visit summary and questions were answered concerning future plans. I have discussed medication side effects and warnings with the patient as well. Informed pt to return to the office or go to the ER if she experiences vaginal bleeding, vaginal discharge, leaking of fluid, pelvic cramping.     Pt seen and discussed with Dr. Pedro Rosenberg DO (attending physician)    Gabrielle Gandara DO  Family Medicine Resident

## 2018-12-28 NOTE — PATIENT INSTRUCTIONS
Semanas 32 a 34 de solomon embarazo: Instrucciones de cuidado - [ Eduardopérezganeshlalitha Dasha 32 to 29 of Your Pregnancy: Care Instructions ]  Instrucciones de cuidado    Yulissa las últimas semanas de solomon cooper Barton podría sentir más roscoe y [place]. Es importante que descanse cuando pueda. Solomon bebé en crecimiento está ejerciendo más presión sobre solomon vejiga. Por eso, mary vez necesite orinar con más frecuencia. Las hemorroides también son comunes. Estas son venas en la nguyen del recto que causan dolor y comezón. En la semana 36, a la mayoría de las [employer] un análisis para detectar el estreptococo del lopez B (GBS, por trent siglas en inglés). El GBS es lazarus bacteria común que puede vivir en la vagina y el recto. Podría enfermar a solomon bebé después del nacimiento. Si el [employer] positivo, le darán antibióticos yulissa el [place]. Estos prevendrán que el bebé se contagie con la bacteria. Podría convenirle hablar con solomon médico sobre poner en un banco la chet del cordón umbilical de solomon bebé. Esta es la chet que queda en el cordón después del nacimiento. Si desea guardar esta chet, debe hacer los trámites necesarios con anticipación. No puede decidirlo en el último minuto. Si todavía no le tobias aplicado la vacuna Tdap (tétanos, difteria y tos [place]) yulissa scott [place], hable con solomon médico acerca de aplicársela. Lord Ryann a proteger a solomon recién nacido contra la infección por tos ferina. La atención de seguimiento es lazarus parte clave de solomon tratamiento y seguridad. Asegúrese de hacer y acudir a todas las citas, y llame a solomon médico si está teniendo problemas. También es lazarus buena idea saber los resultados de trent exámenes y mantener lazarus lista de los medicamentos que terrie. ¿Cómo puede cuidarse en el hogar? Alivio de las hemorroides  · Aumente en solomon dieta la cantidad de líquidos, frutas, verduras y Gretchen. Br[place]ley ayudará a Morelia Bonner. · Evite estar sentada yulissa demasiado tiempo.  Recuéstese sobre el lado ana varias veces al día. · Límpiese con papel higiénico suave y húmedo. O puede utilizar compresas de infusión de hamamelis [facility] (\"witch hazel\") o toallas de higiene personal.  · Si tiene malestar, pruebe a usar compresas de hielo. O puede hacer stephanie de asiento tibios. Hágalos lee 20 minutos por vez, según lo necesite. · Use lazarus crema con hidrocortisona para el dolor y la picazón. Lizzy Neff son Anusol y Preparation H Hydrocortisone. · Pregúntele a garcía médico si puede zehra un ablandador de heces de venta phillip. Considere el amamantamiento  · Los expertos recomiendan que las mujeres amamanten por Olaf Goode. La leche materna es el mejor alimento para los bebés. · A los bebés les resulta más fácil digerir la [employer] materna que la [employer] de [place]. Y siempre está disponible, tiene la temperatura ideal y es gratuita. · [place] ayudar a proteger a garcía bebé de algunos problemas de jean-pierre. En comparación con los bebés alimentados con leche de [place], los bebés que se alimentan con leche materna tienen menos probabilidades de:  ? Katlyn Luis infecciones de oído, resfriados, diarrea y neumonía. ? Ser obesos o tener diabetes más adelante en garcía everett. · [employer] a noris bebés tienen menos sangrado después del [place]. Noris úteros también recobran garcía tamaño normal más rápido. · Algunas mujeres que amamantan bajan de [place] rápido. Elaborar leche quema calorías. · El amamantamiento puede disminuir el riesgo de tener cáncer de seno (mama), cáncer de ovario y osteoporosis. Decida sobre la circuncisión para los niños  · Al zehra esta decisión, podría ser de ayuda pensar en noris tradiciones personales, religiosas y familiares. Es garcía decisión si desea conservar el pene de garcía hijo natural o circuncidar a garcía hijo. · Si decide que le gustaría que circuncidaran a garcía bebé, hable con garcía médico. Discuta cualquier inquietud que tenga sobre el dolor.  También puede hablar acerca de noris preferencias para la anestesia. ¿Dónde puede encontrar más información en inglés? Scott Sinks a http://negrita-loren.info/. Toshia Abelardo E265 en la búsqueda para aprender más acerca de \"Semanas 28 a 29 de solomon embarazo: Instrucciones de cuidado - [ Silverman Lather 32 to 29 of Your Pregnancy: Care Instructions ]. \"  Revisado: 21 noviembre, 2017  Versión del contenido: 11.8  © 6856-8810 Healthwise, Incorporated. Las instrucciones de cuidado fueron adaptadas bajo licencia por Good Help Connections (which disclaims liability or warranty for this information). Si usted tiene [place] afección médica o sobre estas instrucciones, siempre pregunte a solomon profesional de jean-pierre. Healthwise, Incorporated niega toda garantía o responsabilidad por solomon uso de esta información.           Aprenda cuándo llamar a solomon médico lee el embarazo (después de 20 semanas) - [ Learning About When to Call Your Doctor During Pregnancy (After 20 Weeks) ]  Instrucciones de cuidado  Es normal que tenga inquietudes acerca de lo que podría ser un problema lee el Adol Lagos. Aunque la mayoría de las mujeres embarazadas no tienen ningún problema grave, es importante saber cuándo llamar a solomon médico si tiene determinados síntomas o señales de trabajo de [place]. Estas son algunas sugerencias generales. Solomon médico puede darle más información sobre cuándo llamar. Cuándo llamar a solomon médico (después de 20 semanas)  Llame al 911 en cualquier momento que sospeche que puede necesitar atención de [place]. Por ejemplo, llame si:  · Tiene sangrado vaginal intenso. · Tiene dolor repentino e intenso en el abdomen. · Se desmayó (perdió el conocimiento). · Tiene lazarus convulsión. · Ve o siente el cordón umbilical.  · Josie que está a punto de polly a carol a solomon bebé y no puede llegar en forma farias al hospital.  [place] a solomon médico ahora mismo o busque atención médica inmediata si:  · Tiene sangrado vaginal.  · Tiene dolor en el abdomen.   · Tiene fiebre. · Tiene síntomas de preeclampsia, tales trey:  ? Hinchazón repentina de la kendy, las shana o los pies. ? Problemas nuevos con la visión (trey oscurecimiento de la visión o visión borrosa). ? Dolor de gilberto intenso. · Tiene lazarus pérdida repentina de líquido por la vagina. (Piensa que rompió la mona). · Josie que puede estar en Flateyri. [place] significa que usted ha tenido al menos 4 contracciones en 20 minutos o al menos 8 contracciones en [employer]. · Nota que garcía bebé ha dejado de moverse o lo hace mucho menos de lo habitual.  · Tiene síntomas de lazarus infección del tracto urinario. Estos pueden incluir:  ? Dolor o ardor al orinar. ? Necesidad de orinar con frecuencia sin poder eliminar mucha orina. ? Dolor en el flanco, que se encuentra donavan debajo de la caja torácica y [place] de la cintura en un lado de la espalda. ? [employer]. Preste especial atención a los cambios en garcía jean-pierre y asegúrese de comunicarse con garcía médico si:  · Tiene flujo vaginal con un olor desagradable. · Tiene cambios en la piel, tales trey:  ? Salpullido. ? Comezón. ? Color amarillento en la piel. · Tiene otras inquietudes acerca de garcía embarazo. Si tiene signos de trabajo de parto al llegar a las [address] o más  Si tiene señales de [place] de parto a las 37 semanas o [place], es posible que garcía médico le diga que llame cuando garcía trabajo de parto se vuelva más [place]. Los síntomas del trabajo de parto activo incluyen:  · Contracciones que son regulares. · Contracciones a intervalos de menos de 5 minutos. · Contracciones lee las cuales es difícil hablar. La atención de seguimiento es lazarus parte clave de garcía tratamiento y seguridad. Asegúrese de hacer y acudir a todas las citas, y llame a garcía médico si está teniendo problemas. También es lazarus buena idea saber los resultados de trent exámenes y mantener lazarus lista de los medicamentos que terrie. ¿Dónde puede encontrar más información en inglés?   Marcellus File a http://negrita-loren.info/. Escriba D443 en la búsqueda para aprender más acerca de \"Aprenda cuándo llamar a garcía médico lee el embarazo (después de 20 semanas) - [ Learning About When to Call Your Doctor During Pregnancy (After 20 Weeks) ]. \"  Revisado: 21 noviembre, 2017  Versión del contenido: 11.8  © 7790-6588 Healthwise, Incorporated. Las instrucciones de cuidado fueron adaptadas bajo licencia por Good Help Connections (which disclaims liability or warranty for this information). Si usted tiene [place] Tabiona afección médica o sobre estas instrucciones, siempre pregunte a garcía profesional de jean-pierre.  Healthwise, Incorporated niega toda garantía o responsabilidad por garcía uso de esta información.

## 2018-12-28 NOTE — PROGRESS NOTES
Chief Complaint   Patient presents with    Routine Prenatal Visit     33w2d     1. Have you been to the ER, urgent care clinic since your last visit? Hospitalized since your last visit? No    2. Have you seen or consulted any other health care providers outside of the [address] [place] [address] since your last visit? Include any pap smears or colon screening.  No

## 2018-12-29 LAB
ALBUMIN SERPL-MCNC: 3.5 G/DL (ref 3.5–5.5)
ALBUMIN/GLOB SERPL: 1.2 {RATIO} (ref 1.2–2.2)
ALP SERPL-CCNC: 207 IU/L (ref 39–117)
ALT SERPL-CCNC: 251 IU/L (ref 0–32)
AST SERPL-CCNC: 186 IU/L (ref 0–40)
BILIRUB SERPL-MCNC: 0.5 MG/DL (ref 0–1.2)
BUN SERPL-MCNC: 10 MG/DL (ref 6–20)
BUN/CREAT SERPL: 17 (ref 9–23)
CALCIUM SERPL-MCNC: 9.4 MG/DL (ref 8.7–10.2)
CHLORIDE SERPL-SCNC: 103 MMOL/L (ref 96–106)
CO2 SERPL-SCNC: 17 MMOL/L (ref 20–29)
CREAT SERPL-MCNC: 0.6 MG/DL (ref 0.57–1)
ERYTHROCYTE [DISTWIDTH] IN BLOOD BY AUTOMATED COUNT: 13.3 % (ref 12.3–15.4)
GLOBULIN SER CALC-MCNC: 3 G/DL (ref 1.5–4.5)
GLUCOSE SERPL-MCNC: 92 MG/DL (ref 65–99)
HBV SURFACE AG SERPL QL IA: NEGATIVE
HCT VFR BLD AUTO: 33.2 % (ref 34–46.6)
HGB BLD-MCNC: 11.3 G/DL (ref 11.1–15.9)
HIV 1+2 AB+HIV1 P24 AG SERPL QL IA: NON REACTIVE
MCH RBC QN AUTO: 29.4 PG (ref 26.6–33)
MCHC RBC AUTO-ENTMCNC: 34 G/DL (ref 31.5–35.7)
MCV RBC AUTO: 87 FL (ref 79–97)
PLATELET # BLD AUTO: 193 X10E3/UL (ref 150–379)
POTASSIUM SERPL-SCNC: 4 MMOL/L (ref 3.5–5.2)
PROT SERPL-MCNC: 6.5 G/DL (ref 6–8.5)
RBC # BLD AUTO: 3.84 X10E6/UL (ref 3.77–5.28)
RPR SER QL: NON REACTIVE
SODIUM SERPL-SCNC: 140 MMOL/L (ref 134–144)
WBC # BLD AUTO: 7.3 X10E3/UL (ref 3.4–10.8)

## 2018-12-31 ENCOUNTER — TELEPHONE (OUTPATIENT)
Dept: FAMILY MEDICINE CLINIC | Age: 28
End: 2018-12-31

## 2018-12-31 NOTE — TELEPHONE ENCOUNTER
12/31/2018 3:46 PM    MFM appt scheduled 1/9/19. Can be seen at [address] patient the information for her appt on 1/9. Patient understands and agrees to plan.     [place] [facility], DO

## 2019-01-04 ENCOUNTER — ROUTINE PRENATAL (OUTPATIENT)
Dept: FAMILY MEDICINE CLINIC | Age: 29
End: 2019-01-04

## 2019-01-04 VITALS
OXYGEN SATURATION: 99 % | HEART RATE: 74 BPM | TEMPERATURE: 98.1 F | SYSTOLIC BLOOD PRESSURE: 104 MMHG | RESPIRATION RATE: 18 BRPM | HEIGHT: 62 IN | DIASTOLIC BLOOD PRESSURE: 68 MMHG | BODY MASS INDEX: 35.33 KG/M2 | WEIGHT: 192 LBS

## 2019-01-04 DIAGNOSIS — R74.01 ELEVATED TRANSAMINASE LEVEL: ICD-10-CM

## 2019-01-04 DIAGNOSIS — Z87.19 HISTORY OF CHOLESTASIS DURING PREGNANCY: ICD-10-CM

## 2019-01-04 DIAGNOSIS — Z3A.34 34 WEEKS GESTATION OF PREGNANCY: Primary | ICD-10-CM

## 2019-01-04 DIAGNOSIS — Z87.59 HISTORY OF CHOLESTASIS DURING PREGNANCY: ICD-10-CM

## 2019-01-04 DIAGNOSIS — E66.9 OBESITY, UNSPECIFIED CLASSIFICATION, UNSPECIFIED OBESITY TYPE, UNSPECIFIED WHETHER SERIOUS COMORBIDITY PRESENT: ICD-10-CM

## 2019-01-04 LAB
BILIRUB UR QL STRIP: NEGATIVE
GLUCOSE UR-MCNC: NEGATIVE MG/DL
KETONES P FAST UR STRIP-MCNC: NEGATIVE MG/DL
PH UR STRIP: 8.5 [PH] (ref 4.6–8)
PROT UR QL STRIP: NEGATIVE
SP GR UR STRIP: 1.01 (ref 1–1.03)
SPECIMEN STATUS REPORT, ROLRST: NORMAL
UA UROBILINOGEN AMB POC: ABNORMAL (ref 0.2–1)
URINALYSIS CLARITY POC: CLEAR
URINALYSIS COLOR POC: YELLOW
URINE BLOOD POC: NEGATIVE
URINE LEUKOCYTES POC: NEGATIVE
URINE NITRITES POC: NEGATIVE

## 2019-01-04 RX ORDER — URSODIOL 250 MG/1
250 TABLET, FILM COATED ORAL 2 TIMES DAILY
Qty: 60 TAB | Refills: 0 | Status: SHIPPED | OUTPATIENT
Start: 2019-01-04 | End: 2019-01-08 | Stop reason: SDUPTHER

## 2019-01-04 NOTE — PROGRESS NOTES
Specimen status report faxed to [facility] 1/4/19  AST up to 16 from 104, ALT up to 251 from 138  HIV NR, RPR NR, HepBsAg neg, CBC with mildly low HCT

## 2019-01-04 NOTE — PATIENT INSTRUCTIONS
Semanas 34 a 36 de garcía embarazo: Instrucciones de cuidado - [ Raenette Loges 34 to 39 of Your Pregnancy: Care Instructions ]  Instrucciones de cuidado    A estas [place], garcía bebé y garcía abdomen habrán crecido considerablemente. Judith es Faustino de polly a carol. En un embarazo a término se puede polly a [employer] 40 y 43. Los pulmones de garcía bebé están judith listos para respirar aire. Los huesos de la gilberto de garcía bebé ahora son bastante firmes trey para protegerla cristina se mantienen lo suficientemente blandos trey para moverse al atravesar el canal de [place]. Es posible que sienta entusiasmo, guerline, ansiedad o miedo. Quizá se pregunte cómo se dará cuenta de si está en trabajo de parto o qué esperar en rita momento. Trate de ser flexible con trent expectativas respecto del nacimiento. Dado que cada nacimiento es diferente, no hay manera de saber exactamente cómo será garcía parto. Esta hoja de cuidados la ayudará a saber qué esperar y cómo prepararse. Le podría facilitar el parto. Si todavía no le tobias aplicado la vacuna Tdap (tétanos, difteria y tos [place]) lee scott [place], hable con garcía médico acerca de aplicársela. Derryl Kasal a proteger a garcía recién nacido contra la infección por tos ferina. En la semana 36, a la mayoría de las mujeres se les hace lazarus prueba de estreptococos del lopez B (GBS, por trent siglas en inglés). Los estreptococos del lopez B son bacterias comunes que pueden vivir en la vagina y el recto. Pueden hacer que garcía bebé se enferme después del parto. Si el resultado es positivo, usted recibirá antibióticos lee el trabajo de [place]. Los medicamentos evitarán que garcía bebé contraiga las bacterias. La atención de seguimiento es lazarus parte clave de garcía tratamiento y seguridad. Asegúrese de hacer y acudir a todas las citas, y llame a garcía médico si está teniendo problemas. También es lazarus buena idea saber los resultados de trent exámenes y mantener lazarus lista de los medicamentos que terrie.   ¿Cómo puede cuidarse en el hogar? Aprenda sobre las alternativas para aliviar el dolor  · El dolor se manifiesta de modo diferente en cada josefina. Hable con garcía médico acerca de trent sentimientos sobre el dolor. · Puede elegir entre varias formas de aliviar el dolor. Estas incluyen medicamentos o técnicas de respiración, así trey medidas para estar cómoda. Usted puede utilizar más de [place] opción. · Si elige un analgésico (medicamento para el dolor) lee el trabajo de [place], hable con garcía médico acerca de trent opciones. Algunos medicamentos reducen la ansiedad y [place] [facility] a aliviar parte del dolor. Otros adormecen la parte inferior del cuerpo para que no sienta dolor. · Asegúrese de decirle a garcía médico acerca de garcía elección de analgésico antes de empezar el trabajo de parto o muy temprano en el [place]. Es posible que pueda cambiar de parecer a medida que avanza el [place]. · Nalini vez se duerme a lazarus josefina con medicamentos administrados a través de lazarus máscara o por vía intravenosa (IV). Trabajo de parto y [place]  · La primera etapa del [place] de parto se divide en kelly fases: Ruelas Pacer y de transición. ? La mayoría de las mujeres experimentan la fase latente del [place] de parto en trent hogares. Usted puede [employer] o descansar, comer refrigerios livianos, beber líquidos ely y comenzar a contar las contracciones. ? Cuando advierta que se le vuelve difícil hablar lee lazarus contracción, es posible que esté por pasar a la fase activa. Lee la fase Geeta Bolds, debería ir al hospital si no está allí aún. ? Usted está en la fase activa cuando tiene contracciones cada 3 o 4 minutos y van alrededor de 60 segundos. El prasad uterino comienza a abrirse con más rapidez.  ? Si se le rompe la mona, las contracciones serán más intensas y más frecuentes. ? Lee la fase de transición, el prasad uterino se estira y las contracciones se producen con Karlee Sebastián.   ? Haider Pineda tenga deseos de pujar, sin embargo es posible que el prasad uterino aún no esté preparado. El médico le dirá cuándo pujar. · La segunda etapa comienza cuando el prasad uterino se abre por completo y usted está lista para pujar. ? Las contracciones son muy intensas a fin de empujar al bebé por el canal de parto. ? Sentirá la necesidad de pujar. Podría sentir trey si tuviera ganas de evacuar el intestino. ? Dary Dolphin entrenen a [employer]. Estas contracciones serán muy intensas cristina no ocurrirán con tanta frecuencia. Puede descansar un poco entre contracciones. ? Es posible que esté sensible e irritable. Es posible que no se dé cuenta de lo que pasa a garcía alrededor. ? Un último esfuerzo y habrá nacido garcía bebé. · La tercera etapa ocurre cuando con unas cuantas contracciones más se expulsa la placenta. [place] puede durar 30 minutos o menos. · La cuarta etapa es la de recuperación. Es posible que se sienta abrumada con las emociones y exhausta cristina alerta. Julia es un buen momento para comenzar el amamantamiento. ¿Dónde puede encontrar más información en inglés? Mario Browning a http://negrita-loren.info/. Radha Mason B846 en la búsqueda para aprender más acerca de \"Semanas 34 a 39 de garcía embarazo: Instrucciones de cuidado - [ Khoi Copeland 34 to 39 of Your Pregnancy: Care Instructions ]. \"  Revisado: 21 noviembre, 2017  Versión del contenido: 11.8  © 7205-6090 [employer], Incorporated. Las instrucciones de cuidado fueron adaptadas bajo licencia por Good Help Connections (which disclaims liability or warranty for this information). Si usted tiene [place] afección médica o sobre estas instrucciones, siempre pregunte a garcía profesional de jean-pierre.  HealthMona, Incorporated niega toda garantía o responsabilidad por garcía uso de esta información.      Aprenda cuándo llamar a garcía médico lee el embarazo (después de 20 semanas) - [ Learning About When to Call Your Doctor During Pregnancy (After 20 Weeks) ]  Instrucciones de cuidado  Es normal que tenga inquietudes acerca de lo que podría ser un problema lee el embarazo. Aunque la mayoría de las mujeres embarazadas no tienen ningún problema grave, es importante saber cuándo llamar a garcía médico si tiene determinados síntomas o señales de trabajo de [place]. Estas son algunas sugerencias generales. García médico puede darle más información sobre cuándo llamar. Cuándo llamar a garcía médico (después de 20 semanas)  Llame al 911 en cualquier momento que sospeche que puede necesitar atención de [place]. Por ejemplo, llame si:  · Tiene sangrado vaginal intenso. · Tiene dolor repentino e intenso en el abdomen. · Se desmayó (perdió el conocimiento). · Tiene lazarus convulsión. · Ve o siente el cordón umbilical.  · Josie que está a punto de polly a carol a garcía bebé y no puede llegar en forma farias al hospital.  Mylinda Leaf a garcía médico ahora mismo o busque atención médica inmediata si:  · Tiene sangrado vaginal.  · Tiene dolor en el abdomen. · Tiene fiebre. · Tiene síntomas de preeclampsia, tales trey:  ? Hinchazón repentina de la kendy, las shana o los pies. ? Problemas nuevos con la visión (trey oscurecimiento de la visión o visión borrosa). ? Dolor de gilberto intenso. · Tiene lazarus pérdida repentina de líquido por la vagina. (Piensa que rompió la mona). · Josie que puede estar en Flateyri. [place] significa que usted ha tenido al menos 4 contracciones en 20 minutos o al menos 8 contracciones en [employer]. · Nota que garcía bebé ha dejado de moverse o lo hace mucho menos de lo habitual.  · Tiene síntomas de lazarus infección del tracto urinario. Estos pueden incluir:  ? Dolor o ardor al orinar. ? Necesidad de orinar con frecuencia sin poder eliminar mucha orina. ? Dolor en el flanco, que se encuentra donavan debajo de la caja torácica y [place] de la cintura en un lado de la espalda. ? [employer].   Preste especial atención a los cambios en garcía jean-pierre y asegúrese de comunicarse con garcía médico si:  · Tiene flujo vaginal con un olor desagradable. · Tiene cambios en la piel, tales trey:  ? Salpullido. ? Comezón. ? Color amarillento en la piel. · Tiene otras inquietudes acerca de garcía embarazo. Si tiene signos de trabajo de parto al llegar a las 37 [facility] o más  Si tiene señales de [place] de parto a las 37 semanas o [place], es posible que garcía médico le diga que llame cuando garcía trabajo de parto se vuelva más [place]. Los síntomas del trabajo de parto activo incluyen:  · Contracciones que son regulares. · Contracciones a intervalos de menos de 5 minutos. · Contracciones lee las cuales es difícil hablar. La atención de seguimiento es lazarus parte clave de garcía tratamiento y seguridad. Asegúrese de hacer y acudir a todas las citas, y llame a garcía médico si está teniendo problemas. También es lazarus buena idea saber los resultados de trent exámenes y mantener lazarus lista de los medicamentos que terrie. ¿Dónde puede encontrar más información en inglés? Darshana Marker a http://negrita-loren.info/. Escriba T890 en la búsqueda para aprender más acerca de \"Aprenda cuándo llamar a garcía médico lee el embarazo (después de 20 semanas) - [ Learning About When to Call Your Doctor During Pregnancy (After 20 Weeks) ]. \"  Revisado: 21 noviembre, 2017  Versión del contenido: 11.8  © 2657-5846 Healthwise, CCM [employer]. Las instrucciones de cuidado fueron adaptadas bajo licencia por Good Help Connections (which disclaims liability or warranty for this information). Si usted tiene [place] afección médica o sobre estas instrucciones, siempre pregunte a garcía profesional de jean-pierre.  thredUP, Incorporated niega toda garantía o responsabilidad por garcía uso de esta información.

## 2019-01-04 NOTE — PROGRESS NOTES
Chief Complaint   Patient presents with    Routine Prenatal Visit     34w2d     1. Have you been to the ER, urgent care clinic since your last visit? Hospitalized since your last visit? No    2. Have you seen or consulted any other health care providers outside of the [address] [place] [address] since your last visit? Include any pap smears or colon screening.  No

## 2019-01-04 NOTE — PROGRESS NOTES
Return OB Visit       Subjective:   Mike Beaulieu 33 y.o.   ALEXX: 2019, by Ultrasound  GA:  34w2d. Complaints of intense itching, states Atarax did not help. Denies vaginal bleeding, contractions, loss of fluid. Endorses good fetal movement. [id number]    Allergies- reviewed:   No Known Allergies    Medications- reviewed:   Current Outpatient Medications   Medication Sig    prenatal vit no.112-folate no6 1 mg chew Take 1 Tab by mouth daily.  hydrOXYzine HCl (ATARAX) 10 mg tablet TAKE 1 TAB BY MOUTH THREE (3) TIMES DAILY AS NEEDED FOR ITCHING FOR UP TO 10 DAYS.  ursodiol (ACTIGALL) 250 mg tablet Take 1 Tab by mouth three (3) times daily.  raNITIdine (ZANTAC) 150 mg tablet Take 2 Tabs by mouth two (2) times a day.  docusate sodium (COLACE) 100 mg capsule Take 1 Cap by mouth two (2) times a day for 90 days.  pyridoxine, vitamin B6, (VITAMIN B-6) 25 mg tablet Take 1 Tab by mouth three (3) times daily as needed.  psyllium seed-sucrose (METAMUCIL, SUGAR,) powd Take 1 teaspoon three times a day with meals.  polyethylene glycol (MIRALAX) 17 gram packet Take 1 Packet by mouth daily as needed. No current facility-administered medications for this visit. Past Medical History- reviewed:  Past Medical History:   Diagnosis Date    Cholestasis of pregnancy in third trimester 2015    Constipation     Gestational diabetes 2015    Hypertension     possibly per pt report in [place]. Denied it 5/5/15         Past Surgical History- reviewed:   History reviewed. No pertinent surgical history.       Social History- reviewed:  Social History     Socioeconomic History    Marital status: SINGLE     Spouse name: Not on file    Number of children: Not on file    Years of education: Not on file    Highest education level: Not on file   Social Needs    Financial resource strain: Not on file    Food insecurity - worry: Not on file   [place] insecurity - inability: Not on file    Transportation needs - medical: Not on file   [employer] needs - non-medical: Not on file   Occupational History    Not on file   Tobacco Use    Smoking status: Never Smoker    Smokeless tobacco: Never Used   Substance and Sexual Activity    Alcohol use: No    Drug use: No    Sexual activity: Yes     Partners: Male     Birth control/protection: None, Condom   Other Topics Concern    Not on file   Social History Narrative    ** Merged History Encounter **         ** Merged History Encounter **            Immunizations- reviewed:   Immunization History   Administered Date(s) Administered    Influenza Vaccine (Quad) PF 10/02/2015, 10/05/2018    Influenza Vaccine Split 10/17/2012    Tdap 09/04/2015, 11/26/2018     Objective:     Visit Vitals  /68   Pulse 74   Temp 98.1 °F (36.7 °C) (Oral)   Resp 18   Ht 5' 2\" (1.575 m)   Wt 192 lb (87.1 kg)   LMP 04/13/2018   SpO2 99%   BMI 35.12 kg/m²       Physical Exam:  GENERAL APPEARANCE: alert, well appearing, in no apparent distress  ABDOMEN: soft, nontender, nondistended, no abnormal masses, no epigastric pain, bowel sounds present, fundal height 37 cm, FHT present at 130 bpm  BACK: no CVA tenderness  UTERUS: gravid  EXTREMITIES: no redness or tenderness in the calves or thighs, no edema  NEUROLOGICAL: alert, oriented, normal speech, no focal findings or movement disorder noted    Labs  UA WNL  No results found for this or any previous visit (from the past 12 hour(s)). Assessment   Pregnancy at  34w2d       ICD-10-CM ICD-9-CM    1. 34 weeks gestation of pregnancy Z3A.34 V22.2 AMB POC URINALYSIS DIP STICK AUTO W/O MICRO      BILE ACIDS, TOTAL      METABOLIC PANEL, COMPREHENSIVE      US ABD LTD   2. History of cholestasis during pregnancy Z87.59 V23.49     Z87.19 V12.79    3. Elevated transaminase level R74.0 790.4    4.  Obesity, unspecified classification, unspecified obesity type, unspecified whether serious comorbidity present E66.9 278.00      Plan   IUP at 92V4M complicated by hx of IHCP in previous pregnancy, obesity, elevated tranaminases  PNL: O+, Ab screen neg, G/C neg, Rubella immune, HepBsAg neg, Tpal neg, HIV NR, AFP Tetra neg  - Hx of IHCP in previous pregnancy - previous bile acids 14.4 (threshold 14.5), , . Unfortunately bile acids from last week did not result. Repeat bile acids and CMP today as patient will likely meet threshold for IHCP. Will start Ursodiol 250mg BID. Will order Abd US given elevated transaminases. - Obesity - A1c 5.3, 1hr OGTT 173, normal 3hr GTT  - Hx of UTI (enterocccus group D and proteus) with neg ROSS  - Flu 10/5/18 and Tdap 11/26/18  - 3rd trimester labs WNL  - Return 1/8/19 for next prenatal with Dr. Jc Hoover This Encounter    US ABD LTD     Standing Status:   Future     Number of Occurrences:   1     Standing Expiration Date:   2/4/2020     Order Specific Question:   Is Patient Pregnant? Answer:   Yes     Order Specific Question:   Reason for Exam     Answer:   elevated liver enzymes     Order Specific Question:   Specific Body Part     Answer:   RUQ    BILE ACIDS, TOTAL    METABOLIC PANEL, COMPREHENSIVE    AMB POC URINALYSIS DIP STICK AUTO W/O MICRO    DISCONTD: ursodiol (ACTIGALL) 250 mg tablet     Sig: Take 1 Tab by mouth two (2) times a day. Dispense:  60 Tab     Refill:  0     Labor precautions discussed, including: Regular painful contractions, lasting for greater than one hour, taking your breath away; any vaginal bleeding; any leakage of fluid; or absent or decreased fetal movement. Call M.D. on call if any of these symptoms or signs occur. I have discussed the diagnosis with the patient and the intended plan as seen in the above orders. Patient verbalizes understanding of the treatment plan and agrees with the plan. The patient has received an after-visit summary and questions were answered concerning future plans.   I have discussed medication side effects and warnings with the patient as well. Informed pt to return to the office or go to the ER if she experiences vaginal bleeding, vaginal discharge, leaking of fluid, pelvic cramping.     Pt discussed with Dr. Bib Salcedo and Dr. Jessie Hamlin (attending physician)    Jean-Pierre Lees DO  Family Medicine Resident

## 2019-01-06 LAB
ALBUMIN SERPL-MCNC: 3.2 G/DL (ref 3.5–5.5)
ALBUMIN/GLOB SERPL: 1 {RATIO} (ref 1.2–2.2)
ALP SERPL-CCNC: 249 IU/L (ref 39–117)
ALT SERPL-CCNC: 197 IU/L (ref 0–32)
AST SERPL-CCNC: 132 IU/L (ref 0–40)
BILE AC SERPL-SCNC: 93.5 UMOL/L (ref 4.7–24.5)
BILIRUB SERPL-MCNC: 0.7 MG/DL (ref 0–1.2)
BUN SERPL-MCNC: 8 MG/DL (ref 6–20)
BUN/CREAT SERPL: 13 (ref 9–23)
CALCIUM SERPL-MCNC: 9.5 MG/DL (ref 8.7–10.2)
CHLORIDE SERPL-SCNC: 104 MMOL/L (ref 96–106)
CO2 SERPL-SCNC: 20 MMOL/L (ref 20–29)
CREAT SERPL-MCNC: 0.62 MG/DL (ref 0.57–1)
GLOBULIN SER CALC-MCNC: 3.1 G/DL (ref 1.5–4.5)
GLUCOSE SERPL-MCNC: 76 MG/DL (ref 65–99)
POTASSIUM SERPL-SCNC: 4.4 MMOL/L (ref 3.5–5.2)
PROT SERPL-MCNC: 6.3 G/DL (ref 6–8.5)
SODIUM SERPL-SCNC: 140 MMOL/L (ref 134–144)

## 2019-01-06 NOTE — PROGRESS NOTES
Bile acids 93.5, tranaminases slightly improved. Called with a [employer]  [id number] and left VM to increase ursodiol to three times daily, 1 tab every 8 hours. Pt will follow up on Tuesday.

## 2019-01-07 ENCOUNTER — HOSPITAL ENCOUNTER (OUTPATIENT)
Dept: ULTRASOUND IMAGING | Age: 29
Discharge: HOME | End: 2019-01-07
Attending: FAMILY MEDICINE
Payer: SUBSIDIZED

## 2019-01-07 ENCOUNTER — TELEPHONE (OUTPATIENT)
Dept: FAMILY MEDICINE CLINIC | Age: 29
End: 2019-01-07

## 2019-01-07 DIAGNOSIS — Z3A.34 34 WEEKS GESTATION OF PREGNANCY: ICD-10-CM

## 2019-01-07 PROCEDURE — 76705 ECHO EXAM OF ABDOMEN: CPT

## 2019-01-07 NOTE — TELEPHONE ENCOUNTER
center at [facility] called stating the patient showed up there for an appointment she was supposed to have at 3pm but they have no record of an appointment there. I looked in the patients chart and explained the she is supposed to be seen at [facility] radiology for an ultrasound at 3pm today. They will send the patient that way.

## 2019-01-08 ENCOUNTER — ROUTINE PRENATAL (OUTPATIENT)
Dept: FAMILY MEDICINE CLINIC | Age: 29
End: 2019-01-08

## 2019-01-08 VITALS
WEIGHT: 190 LBS | TEMPERATURE: 97.2 F | HEIGHT: 62 IN | SYSTOLIC BLOOD PRESSURE: 104 MMHG | BODY MASS INDEX: 34.96 KG/M2 | HEART RATE: 77 BPM | DIASTOLIC BLOOD PRESSURE: 58 MMHG | RESPIRATION RATE: 18 BRPM | OXYGEN SATURATION: 96 %

## 2019-01-08 DIAGNOSIS — O26.619 INTRAHEPATIC CHOLESTASIS OF PREGNANCY: ICD-10-CM

## 2019-01-08 DIAGNOSIS — K83.1 INTRAHEPATIC CHOLESTASIS OF PREGNANCY: ICD-10-CM

## 2019-01-08 DIAGNOSIS — Z34.90 PREGNANCY, UNSPECIFIED GESTATIONAL AGE: Primary | ICD-10-CM

## 2019-01-08 LAB
BILIRUB UR QL STRIP: NORMAL
GLUCOSE UR-MCNC: NEGATIVE MG/DL
KETONES P FAST UR STRIP-MCNC: NEGATIVE MG/DL
PH UR STRIP: 7 [PH] (ref 4.6–8)
PROT UR QL STRIP: NORMAL
SP GR UR STRIP: 1.02 (ref 1–1.03)
UA UROBILINOGEN AMB POC: NORMAL (ref 0.2–1)
URINALYSIS CLARITY POC: NORMAL
URINALYSIS COLOR POC: NORMAL
URINE BLOOD POC: NORMAL
URINE LEUKOCYTES POC: NORMAL
URINE NITRITES POC: NEGATIVE

## 2019-01-08 RX ORDER — URSODIOL 250 MG/1
250 TABLET, FILM COATED ORAL 3 TIMES DAILY
Qty: 120 TAB | Refills: 0 | Status: SHIPPED | OUTPATIENT
Start: 2019-01-08 | End: 2019-01-11 | Stop reason: SDUPTHER

## 2019-01-08 RX ORDER — HYDROXYZINE HYDROCHLORIDE 10 MG/1
TABLET, FILM COATED ORAL
Refills: 0 | COMMUNITY
Start: 2018-12-28 | End: 2019-01-19

## 2019-01-08 NOTE — PATIENT INSTRUCTIONS
Take ursodiol/actigal THREE TIMES A DAY     - B6 for nausea  - Zantac for heartburn     Colestasis del embarazo: Instrucciones de cuidado - [ Cholestasis of Pregnancy: Care Instructions ]  Instrucciones de cuidado    La colestasis del embarazo es un problema hepático. Hace que la piel pique mucho. Ocurre cuando la bilis no fluye muy samuel fuera del hígado. Scott problema no le causa ningún problema de jean-pierre grave a lazarus josefina [place]. [place] puede causarle problemas a garcía bebé. García médicoquerrá observarlos atentamente a usted y a garcía bebé. Para mantenerlos a ambos lo más saludables que sea posible, garcía médico podría recomendar que se adelante el parto. A veces, los médicos también recomiendan medicamentos. Los ácidos biliares pueden reducirse con medicamentos. Después de que nace el bebé, scott problema desaparece. La atención de seguimiento es lazarus parte clave de garcía tratamiento y seguridad. Asegúrese de hacer y acudir a todas las citas, y llame a garcía médico si está teniendo problemas. También es lazarus buena idea saber los resultados de trent exámenes y mantener lazarus lista de los medicamentos que terrie. ¿Cómo puede cuidarse en el hogar? · Sea gregor con los medicamentos. [place] trent medicamentos exactamente trey le fueron recetados. Llame a garcía médico si kaylynn estar teniendo un problema con garcía medicamento. · Si garcía médico se las receta, use cremas o pastillas para ayudar con la comezón. · Use loción de calamina en las zonas en las que tenga comezón. · No se dé duchas ni stephanie calientes. [place] puede empeorar la comezón. ¿Cuándo debe pedir ayuda?   Llame a garcía médico ahora mismo o busque atención médica inmediata si:    · García comezón empeora o empieza a tener otros síntomas.     · Piensa que está en trabajo de parto.     · Tiene un color amarillento en la piel o en la parte brianna de los ojos que es nuevo o que está aumentando.    Preste especial atención a los cambios en garcía jean-pierre y asegúrese de comunicarse con [place] médico si tiene cualquier pregunta o inquietud. ¿Dónde puede encontrar más información en inglés? Scott Johnson a http://negrita-loren.info/. Toshia Zhou E574 en la búsqueda para aprender más acerca de \"Colestasis del [place]: Instrucciones de cuidado - [ Cholestasis of Pregnancy: Care Instructions ]. \"  Revisado: 21 noviembre, 2017  Versión del contenido: 11.8  © 0191-6248 Healthwise, Incorporated. Las instrucciones de cuidado fueron adaptadas bajo licencia por [employer] (which disclaims liability or warranty for this information). Si usted tiene [place] afección médica o sobre estas instrucciones, siempre pregunte a garcía profesional de jean-pierre. Healthwise, Incorporated niega toda garantía o responsabilidad por garcía uso de esta información.

## 2019-01-08 NOTE — PROGRESS NOTES
Return OB Visit       Subjective:   Nam Baker 33 y.o.   ALEXX: 2019, by Ultrasound  GA:  34w6d. Used [employer]  [id number] due to language barrier. Positive fetal movement, No Vaginal bleeding, No LOF. No Contractions, No Urinary symptoms. Taking PNV. Additional concerns today:  - Vomiting and heartburn. Unsure if she has zantac or B6. Says one med helped but she does not remember the name. Is not taking anything consistently besides PNV.   - Itching improved - Didn't NOT fill actigall prescription because itching resolved spontaneously after last visit (). Says she took atarax briefly but stopped as her symptoms resolved. Allergies- reviewed:   No Known Allergies    Medications- reviewed:   Current Outpatient Medications   Medication Sig    ursodiol (ACTIGALL) 250 mg tablet Take 1 Tab by mouth three (3) times daily.  docusate sodium (COLACE) 100 mg capsule Take 1 Cap by mouth two (2) times a day for 90 days.  pyridoxine, vitamin B6, (VITAMIN B-6) 25 mg tablet Take 1 Tab by mouth three (3) times daily as needed.  prenatal vit no.112-folate no6 1 mg chew Take 1 Tab by mouth daily.  psyllium seed-sucrose (METAMUCIL, SUGAR,) powd Take 1 teaspoon three times a day with meals.  polyethylene glycol (MIRALAX) 17 gram packet Take 1 Packet by mouth daily as needed.  hydrOXYzine HCl (ATARAX) 10 mg tablet TAKE 1 TAB BY MOUTH THREE (3) TIMES DAILY AS NEEDED FOR ITCHING FOR UP TO 10 DAYS.  raNITIdine (ZANTAC) 150 mg tablet Take 2 Tabs by mouth two (2) times a day. No current facility-administered medications for this visit. Past Medical History- reviewed:  Past Medical History:   Diagnosis Date    Cholestasis of pregnancy in third trimester 2015    Constipation     Gestational diabetes 2015    Hypertension     possibly per pt report in [place].  Denied it 5/5/15     Past Surgical History- reviewed:   No past surgical history on file.    Social History- reviewed:  Social History     Socioeconomic History    Marital status: SINGLE     Spouse name: Not on file    Number of children: Not on file    Years of education: Not on file    Highest education level: Not on file   Social Needs    Financial resource strain: Not on file    Food insecurity - worry: Not on file    Food insecurity - inability: Not on file    Transportation needs - medical: Not on file   [employer] needs - non-medical: Not on file   Occupational History    Not on file   Tobacco Use    Smoking status: Never Smoker    Smokeless tobacco: Never Used   Substance and Sexual Activity    Alcohol use: No    Drug use: No    Sexual activity: Yes     Partners: Male     Birth control/protection: None, Condom   Other Topics Concern    Not on file   Social History Narrative    ** Merged History Encounter **         ** Merged History Encounter **          Immunizations- reviewed:   Immunization History   Administered Date(s) Administered    Influenza Vaccine (Quad) PF 10/02/2015, 10/05/2018    Influenza Vaccine Split 10/17/2012    Tdap 09/04/2015, 11/26/2018     Objective:     Visit Vitals  /58 (BP 1 Location: Right arm, BP Patient Position: Sitting)   Pulse 77   Temp 97.2 °F (36.2 °C) (Oral)   Resp 18   Ht 5' 2\" (1.575 m)   Wt 190 lb (86.2 kg)   LMP 04/13/2018   SpO2 96%   BMI 34.75 kg/m²       Physical Exam:  GENERAL APPEARANCE: alert, well appearing, in no apparent distress  ABDOMEN: soft, nontender, nondistended, no abnormal masses, no epigastric pain, bowel sounds present, fundal height 35 cm, FHT present at 140 bpm  EXTREMITIES: no redness or tenderness in the calves or thighs, no edema  NEUROLOGICAL: alert, oriented, normal speech, no focal findings or movement disorder noted    Labs  Recent Results (from the past 12 hour(s))   AMB POC URINALYSIS DIP STICK AUTO W/O MICRO    Collection Time: 01/08/19  3:19 PM   Result Value Ref Range    Color (UA POC) Asya     Clarity (UA POC) Cloudy     Glucose (UA POC) Negative Negative    Bilirubin (UA POC) 3+ Negative    Ketones (UA POC) Negative Negative    Specific gravity (UA POC) 1.025 1.001 - 1.035    Blood (UA POC) Trace Negative    pH (UA POC) 7.0 4.6 - 8.0    Protein (UA POC) 1+ Negative    Urobilinogen (UA POC) 4 mg/dL 0.2 - 1    Nitrites (UA POC) Negative Negative    Leukocyte esterase (UA POC) Trace Negative       Assessment   Dorina Duncan 33 y.o.   ALEXX: 2019, by Ultrasound  GA:  34w6d. ICD-10-CM ICD-9-CM    1. Pregnancy, unspecified gestational age Z27.80 V22.2 AMB POC URINALYSIS DIP STICK AUTO W/O MICRO   2. Intrahepatic cholestasis of pregnancy O26.619 646.70 ursodiol (ACTIGALL) 250 mg tablet    K83.1 576.8        Plan     Orders Placed This Encounter    AMB POC URINALYSIS DIP STICK AUTO W/O MICRO    hydrOXYzine HCl (ATARAX) 10 mg tablet     Sig: TAKE 1 TAB BY MOUTH THREE (3) TIMES DAILY AS NEEDED FOR ITCHING FOR UP TO 10 DAYS. Refill:  0    ursodiol (ACTIGALL) 250 mg tablet     Sig: Take 1 Tab by mouth three (3) times daily. Dispense:  120 Tab     Refill:  0     Routine Supervision of Pregnancy  - PNL: O+, Ab screen neg, G/C neg, Rubella immune, HepBsAg neg, Tpal neg, HIV NR, AFP Tetra neg  - s/p Flu 10/5/18 and Tdap 18  - Failed 1 hr GTT but passed 3 hr GTT. Complicated by:   - IHCP - also present in previous preg. Bile acids 14.4 --> 94. Elevated LFTs now downtrending (last checked ). PRN atarax for itching, though it appears to have resolved. Abdominal US  with sludge in gallbladder, otherwise negative. - Obesity - A1c 5.3, 1hr OGTT 173, normal 3hr GTT. Patient has actually had 9 lb weight loss throughout pregnancy. - Hx of UTI (enterocccus group D and proteus) with neg ROSS    Today's visit:   - UA today: 3+ bilirubin, not unexpected given IHCP  - GBS at next visit   - MFM appt tomorrow - reminded patient who states she is aware.    - D/w patient the importance of picking up actigall and taking TID. Discussed risks to fetus of IHCP including increased risk of fetal demise. Patient expressed understanding. Also advised that if she had any decrease in fetal movement to go to ED for further evaluation. Follow up: RTC in 3 days - appt made with myself 1/11 at 3:25pm. Also has f/u on 1/14 with Dr. Reanna Lewis.     Labor precautions discussed, including: Regular painful contractions, lasting for greater than one hour, taking your breath away; any vaginal bleeding; any leakage of fluid; or absent or decreased fetal movement. Call M.D. on call if any of these symptoms or signs occur. I have discussed the diagnosis with the patient and the intended plan as seen in the above orders. The patient has received an after-visit summary and questions were answered concerning future plans. I have discussed medication side effects and warnings with the patient as well. Informed pt to return to the office or go to the ER if she experiences vaginal bleeding, vaginal discharge, leaking of fluid, pelvic cramping.     Pt discussed with Dr. Nikole Garcia (attending physician)    Martina Stallings MD  Family Medicine Resident, PGY-2

## 2019-01-09 ENCOUNTER — HOSPITAL ENCOUNTER (OUTPATIENT)
Dept: PERINATAL CARE | Age: 29
Discharge: HOME | End: 2019-01-09
Attending: OBSTETRICS & GYNECOLOGY
Payer: SUBSIDIZED

## 2019-01-09 LAB — BILE AC SERPL-SCNC: NORMAL UMOL/L

## 2019-01-09 PROCEDURE — 76816 OB US FOLLOW-UP PER FETUS: CPT | Performed by: OBSTETRICS & GYNECOLOGY

## 2019-01-09 PROCEDURE — 76819 FETAL BIOPHYS PROFIL W/O NST: CPT | Performed by: OBSTETRICS & GYNECOLOGY

## 2019-01-11 ENCOUNTER — ROUTINE PRENATAL (OUTPATIENT)
Dept: FAMILY MEDICINE CLINIC | Age: 29
End: 2019-01-11

## 2019-01-11 VITALS
TEMPERATURE: 97 F | OXYGEN SATURATION: 98 % | BODY MASS INDEX: 34.78 KG/M2 | HEART RATE: 71 BPM | WEIGHT: 189 LBS | RESPIRATION RATE: 16 BRPM | DIASTOLIC BLOOD PRESSURE: 57 MMHG | SYSTOLIC BLOOD PRESSURE: 93 MMHG | HEIGHT: 62 IN

## 2019-01-11 DIAGNOSIS — O26.619 INTRAHEPATIC CHOLESTASIS OF PREGNANCY: ICD-10-CM

## 2019-01-11 DIAGNOSIS — R30.0 DYSURIA: ICD-10-CM

## 2019-01-11 DIAGNOSIS — Z34.93 PRENATAL CARE IN THIRD TRIMESTER: Primary | ICD-10-CM

## 2019-01-11 DIAGNOSIS — K83.1 INTRAHEPATIC CHOLESTASIS OF PREGNANCY: ICD-10-CM

## 2019-01-11 LAB
BILIRUB UR QL STRIP: NORMAL
GLUCOSE UR-MCNC: NEGATIVE MG/DL
KETONES P FAST UR STRIP-MCNC: NEGATIVE MG/DL
PH UR STRIP: 7.5 [PH] (ref 4.6–8)
PROT UR QL STRIP: NORMAL
SP GR UR STRIP: 1.02 (ref 1–1.03)
UA UROBILINOGEN AMB POC: NORMAL (ref 0.2–1)
URINALYSIS CLARITY POC: NORMAL
URINALYSIS COLOR POC: NORMAL
URINE BLOOD POC: NORMAL
URINE LEUKOCYTES POC: NEGATIVE
URINE NITRITES POC: NEGATIVE
WET MOUNT POCT, WMPOCT: NORMAL

## 2019-01-11 RX ORDER — URSODIOL 300 MG/1
300 CAPSULE ORAL 3 TIMES DAILY
Qty: 60 CAP | Refills: 0 | Status: SHIPPED | OUTPATIENT
Start: 2019-01-11 | End: 2019-01-11

## 2019-01-11 RX ORDER — URSODIOL 250 MG/1
250 TABLET, FILM COATED ORAL 3 TIMES DAILY
Qty: 30 EACH | Refills: 0 | Status: SHIPPED | OUTPATIENT
Start: 2019-01-11 | End: 2019-01-19

## 2019-01-11 RX ORDER — URSODIOL 250 MG/1
250 TABLET, FILM COATED ORAL 3 TIMES DAILY
Qty: 60 TAB | Refills: 0 | Status: SHIPPED | OUTPATIENT
Start: 2019-01-11 | End: 2019-01-11 | Stop reason: DRUGHIGH

## 2019-01-11 NOTE — PROGRESS NOTES
Plan as per Dr. Desiree Almonte note  Start ursodiol    F/U in one week    I reviewed with the resident the medical history and the resident's findings on the physical examination. I discussed with the resident the patient's diagnosis and concur with the plan.

## 2019-01-11 NOTE — PROGRESS NOTES
Chief Complaint   Patient presents with    Routine Prenatal Visit     .  35w 2d today. No bleeding or LOF.  +FM.  Skin Problem     itching times one month        1. Have you been to the ER, urgent care clinic since your last visit? Hospitalized since your last visit? No    2. Have you seen or consulted any other health care providers outside of the [address] [place] [address] since your last visit? Include any pap smears or colon screening.  No

## 2019-01-11 NOTE — PROGRESS NOTES
Pt reported that pruritus had resolved -- markedly elevated bile acid    Will followup on MFM scan    Recommend ursodiol TID    Strong recommendations that if she notes decrease in fetal movement or any other signs, go directly to L&D    I reviewed with the resident the medical history and the resident's findings on the physical examination. I discussed with the resident the patient's diagnosis and concur with the plan.

## 2019-01-11 NOTE — PROGRESS NOTES
Return OB Visit       Subjective:   Mike Beaulieu 33 y.o.   ALEXX: 2019, by Ultrasound  GA:  35w2d. [employer]  [id number]    Positive fetal movement, No Vaginal bleeding, No LOF. No Contractions, No Urinary symptoms. Additional concerns today:  - IHCP - Actigall. Was not picked up because it was almost $400 and states she was told that if it was too much money that she didn't need to pick it up. Reports the itching has now returned and is affecting her sleep. Is taking the atarax again but it is not working as well. - Dysuria, foul-smelling vaginal discharge x 4 days. Allergies- reviewed:   No Known Allergies    Medications- reviewed:   Current Outpatient Medications   Medication Sig    ursodiol (ACTIGALL) 250 mg tablet Take 1 Tab by mouth three (3) times daily.  prenatal vit no.112-folate no6 1 mg chew Take 1 Tab by mouth daily.  hydrOXYzine HCl (ATARAX) 10 mg tablet TAKE 1 TAB BY MOUTH THREE (3) TIMES DAILY AS NEEDED FOR ITCHING FOR UP TO 10 DAYS.  raNITIdine (ZANTAC) 150 mg tablet Take 2 Tabs by mouth two (2) times a day.  docusate sodium (COLACE) 100 mg capsule Take 1 Cap by mouth two (2) times a day for 90 days.  pyridoxine, vitamin B6, (VITAMIN B-6) 25 mg tablet Take 1 Tab by mouth three (3) times daily as needed.  psyllium seed-sucrose (METAMUCIL, SUGAR,) powd Take 1 teaspoon three times a day with meals.  polyethylene glycol (MIRALAX) 17 gram packet Take 1 Packet by mouth daily as needed. No current facility-administered medications for this visit. Past Medical History- reviewed:  Past Medical History:   Diagnosis Date    Cholestasis of pregnancy in third trimester 2015    Constipation     Gestational diabetes 2015    Hypertension     possibly per pt report in [place]. Denied it 5/5/15     Past Surgical History- reviewed:   No past surgical history on file.     Social History- reviewed:  Social History Socioeconomic History    Marital status: SINGLE     Spouse name: Not on file    Number of children: Not on file    Years of education: Not on file    Highest education level: Not on file   Social Needs    Financial resource strain: Not on file    Food insecurity - worry: Not on file    Food insecurity - inability: Not on file    Transportation needs - medical: Not on file    Transportation needs - non-medical: Not on file   Occupational History    Not on file   Tobacco Use    Smoking status: Never Smoker    Smokeless tobacco: Never Used   Substance and Sexual Activity    Alcohol use: No    Drug use: No    Sexual activity: Yes     Partners: Male     Birth control/protection: None, Condom   Other Topics Concern    Not on file   Social History Narrative    ** Merged History Encounter **         ** Merged History Encounter **        Immunizations- reviewed:   Immunization History   Administered Date(s) Administered    Influenza Vaccine (Quad) PF 10/02/2015, 10/05/2018    Influenza Vaccine Split 10/17/2012    Tdap 09/04/2015, 11/26/2018       Objective:     Visit Vitals  BP 93/57   Pulse 71   Temp 97 °F (36.1 °C) (Oral)   Resp 16   Ht 5' 2\" (1.575 m)   Wt 189 lb (85.7 kg)   LMP 04/13/2018   SpO2 98%   BMI 34.57 kg/m²     Physical Exam:  GENERAL APPEARANCE: alert, well [id number], in no apparent distress  ABDOMEN: soft, nontender, nondistended, no abnormal masses, no epigastric pain, bowel sounds present, fundal height 36 cm, FHT present at 150 bpm  EXTREMITIES: no redness or tenderness in the calves or thighs, no edema  NEUROLOGICAL: alert, oriented, normal speech, no focal findings or movement disorder noted    Labs  No results found for this or any previous visit (from the past 12 hour(s)). Assessment   [address] [place] [address] 33 y.o. G3   ALEXX: 2/13/2019, by Ultrasound  GA:  35w2d. ICD-10-CM ICD-9-CM    1.  Prenatal care in third trimester Z34.93 V22.1 AMB POC URINALYSIS DIP STICK AUTO W/O MICRO      AMB POC SMEAR, STAIN & INTERPRET, WET MOUNT      CULTURE, URINE      CULTURE, GENITAL GROUP B STREP   2. Dysuria R30.0 788.1 AMB POC SMEAR, STAIN & INTERPRET, WET MOUNT      CULTURE, URINE      CHLAMYDIA/GC PCR   3. Intrahepatic cholestasis of pregnancy O26.619 646.70 HEPATITIS C AB    K83.1 576.8 ursodiol (ACTIGALL) 250 mg tablet       Plan     Orders Placed This Encounter    CULTURE, URINE    CHLAMYDIA/GC PCR     Standing Status:   Future     Standing Expiration Date:   1/12/2020     Order Specific Question:   Sample source     Answer:   Urine [258]     Order Specific Question:   Specimen source     Answer:   Urine [258]    CULTURE, GENITAL GROUP B STREP    HEPATITIS C AB    AMB POC URINALYSIS DIP STICK AUTO W/O MICRO    AMB POC WET PREP (AKA STAIN, INTERPRET, WET MOUNT)    DISCONTD: ursodiol (ACTIGALL) 250 mg tablet     Sig: Take 1 Tab by mouth three (3) times daily. Dispense:  60 Tab     Refill:  0    DISCONTD: ursodiol (ACTIGALL) 300 mg capsule     Sig: Take 1 Cap by mouth three (3) times daily. Dispense:  60 Cap     Refill:  0    ursodiol (ACTIGALL) 250 mg tablet     Sig: Take 1 Tab by mouth three (3) times daily. Dispense:  30 Each     Refill:  0     Routine Supervision of Pregnancy  - PNL: O+, Ab screen neg, G/C neg, Rubella immune, HepBsAg neg, Tpal neg, HIV NR, AFP Tetra neg  - s/p Flu 10/5/18 and Tdap 11/26/18  - Failed 1 hr GTT but passed 3 hr GTT  - 3rd trimester labs negative except for bile acids and LFTs. Gc/chlamydia not repeated.      Complicated by:   - IHCP - also present in previous preg. Bile acids 14.4 --> 94. Elevated LFTs now downtrending (last checked 1/4). PRN atarax for itching. Abdominal US 1/7 with sludge in gallbladder, otherwise negative. Seen by MFM 1/9/19, noted \"Delivery between 36-37 weeks due to high risk of stillbirth with IHCP. Weekly surveillance until that time. Please check Hep C status. \"  - Obesity - A1c 5.3, 1hr OGTT 173, normal 3hr GTT. Patient has actually had 10 lb weight loss throughout pregnancy. - Hx of UTI (enterocccus group D and proteus) with neg ROSS    Today's visit: Used [employer]  [id number] to discuss plan of care   - UA today: 1+ bilirubin only  - GBS obtained today    - Dysuria/foul odor - Gc/chlamydia (urine), wet prep negative. Reassurance provided. Rephresh. Follow up in 3 days. - Will check Hep C status at next visit per [facility]   - Printed out script for actigal x 6 days, as patient is scheduled for delivery next week. GoodRx coupon provided, cost $32.00. Patient expressed understanding and stated she would  the medication and take TID.   - Delivery plan (IOL) to be discussed with patient at next visit. Clinic appt date/time reviewed w patient, she stated understanding. Follow up: RTC in 3 days - appt already made with Dr. Elva Marin precautions discussed, including: Regular painful contractions, lasting for greater than one hour, taking your breath away; any vaginal bleeding; any leakage of fluid; or absent or decreased fetal movement. Call M.D. on call if any of these symptoms or signs occur. I have discussed the diagnosis with the patient and the intended plan as seen in the above orders. The patient has received an after-visit summary and questions were answered concerning future plans. I have discussed medication side effects and warnings with the patient as well. Informed pt to return to the office or go to the ER if she experiences vaginal bleeding, vaginal discharge, leaking of fluid, pelvic cramping.     Pt discussed with Dr. Sadie Ibrahim (attending physician)    Porsche Bo MD  Family Medicine Resident, PGY-2

## 2019-01-11 NOTE — PATIENT INSTRUCTIONS
Colestasis del embarazo: Instrucciones de cuidado - [ Cholestasis of Pregnancy: Care Instructions ]  Instrucciones de cuidado    La colestasis del embarazo es un problema hepático. Hace que la piel pique mucho. Ocurre cuando la bilis no fluye muy samuel fuera del hígado. [place] problema no le causa ningún problema de jean-pierre grave a lazarus josefina [place]. [place] puede causarle problemas a garcía bebé. García médicoquerrá observarlos atentamente a usted y a garcía bebé. Para mantenerlos a ambos lo más saludables que sea posible, garcía médico podría recomendar que se adelante el parto. A veces, los médicos también recomiendan medicamentos. Los ácidos biliares pueden reducirse con medicamentos. Después de que nace el bebé, scott problema desaparece. La atención de seguimiento es lazarus parte clave de garcía tratamiento y seguridad. Asegúrese de hacer y acudir a todas las citas, y llame a garcía médico si está teniendo problemas. También es lazarus buena idea saber los resultados de trent exámenes y mantener lazarus lista de los medicamentos que terrie. ¿Cómo puede cuidarse en el hogar? · Sea gregor con los medicamentos. [place] trent medicamentos exactamente trey le fueron recetados. Llame a garcía médico si kaylynn estar teniendo un problema con garcía medicamento. · Si garcía médico se las receta, use cremas o pastillas para ayudar con la comezón. · Use loción de calamina en las zonas en las que tenga comezón. · No se dé duchas ni stephanie calientes. [place] puede empeorar la comezón. ¿Cuándo debe pedir ayuda? Llame a garcía médico ahora mismo o busque atención médica inmediata si:    · García comezón empeora o empieza a tener otros síntomas.     · Piensa que está en trabajo de parto.     · Tiene un color amarillento en la piel o en la parte brianna de los ojos que es nuevo o que está aumentando.    Preste especial atención a los cambios en garcía jean-pierre y asegúrese de comunicarse con garcía médico si tiene cualquier pregunta o inquietud.   ¿Dónde puede encontrar más información en inglés? [place] a http://negrita-loren.info/. Tara Servant J785 en la búsqueda para aprender más acerca de \"Colestasis del Oralia: Instrucciones de cuidado - [ Cholestasis of Pregnancy: Care Instructions ]. \"  Revisado: 21 noviembre, 2017  Versión del contenido: 11.8  © 4220-4123 [employer]wise[employer] Inc[employer]. Las instrucciones de cuidado fueron adaptadas bajo licencia por [employer] (which disclaims liability or warranty for this information). Si usted tiene [place] afección médica o sobre estas instrucciones, siempre pregunte a garcía profesional de jean-pierre. Healthwise, Incorporated niega toda garantía o responsabilidad por garcía uso de esta información.

## 2019-01-14 ENCOUNTER — ROUTINE PRENATAL (OUTPATIENT)
Dept: FAMILY MEDICINE CLINIC | Age: 29
End: 2019-01-14

## 2019-01-14 VITALS
HEIGHT: 62 IN | BODY MASS INDEX: 34.6 KG/M2 | TEMPERATURE: 97.6 F | WEIGHT: 188 LBS | DIASTOLIC BLOOD PRESSURE: 69 MMHG | HEART RATE: 72 BPM | OXYGEN SATURATION: 98 % | RESPIRATION RATE: 16 BRPM | SYSTOLIC BLOOD PRESSURE: 101 MMHG

## 2019-01-14 DIAGNOSIS — O26.619 INTRAHEPATIC CHOLESTASIS OF PREGNANCY: ICD-10-CM

## 2019-01-14 DIAGNOSIS — K83.1 INTRAHEPATIC CHOLESTASIS OF PREGNANCY: ICD-10-CM

## 2019-01-14 DIAGNOSIS — E66.9 OBESITY, UNSPECIFIED CLASSIFICATION, UNSPECIFIED OBESITY TYPE, UNSPECIFIED WHETHER SERIOUS COMORBIDITY PRESENT: ICD-10-CM

## 2019-01-14 DIAGNOSIS — R30.0 DYSURIA: ICD-10-CM

## 2019-01-14 DIAGNOSIS — Z34.83 ENCOUNTER FOR SUPERVISION OF OTHER NORMAL PREGNANCY IN THIRD TRIMESTER: Primary | ICD-10-CM

## 2019-01-14 DIAGNOSIS — Z34.80 ENCOUNTER FOR SUPERVISION OF OTHER NORMAL PREGNANCY, UNSPECIFIED TRIMESTER: ICD-10-CM

## 2019-01-14 DIAGNOSIS — Z34.90 PREGNANCY, UNSPECIFIED GESTATIONAL AGE: ICD-10-CM

## 2019-01-14 LAB
BILIRUB UR QL STRIP: ABNORMAL
GLUCOSE UR-MCNC: NEGATIVE MG/DL
KETONES P FAST UR STRIP-MCNC: NEGATIVE MG/DL
PH UR STRIP: 8.5 [PH] (ref 4.6–8)
PROT UR QL STRIP: NEGATIVE
SP GR UR STRIP: 1.01 (ref 1–1.03)
UA UROBILINOGEN AMB POC: ABNORMAL (ref 0.2–1)
URINALYSIS CLARITY POC: ABNORMAL
URINALYSIS COLOR POC: ABNORMAL
URINE BLOOD POC: ABNORMAL
URINE LEUKOCYTES POC: NEGATIVE
URINE NITRITES POC: NEGATIVE

## 2019-01-14 NOTE — PROGRESS NOTES
Chief Complaint   Patient presents with    Routine Prenatal Visit      35w2d     Blood pressure 101/69, pulse 72, temperature 97.6 °F (36.4 °C), temperature source Oral, resp. rate 16, height 5' 2\" (1.575 m), weight 188 lb (85.3 kg), last menstrual period 2018, SpO2 98 %, not currently breastfeeding. 1. Have you been to the ER, urgent care clinic since your last visit? Hospitalized since your last visit? No    2. Have you seen or consulted any other health care providers outside of the [address] [place] [address] since your last visit? Include any pap smears or colon screening. No       Patient denies any bleeding, cramping or leakage of fluid. + fetal movement.

## 2019-01-14 NOTE — PROGRESS NOTES
Return OB Visit       Subjective:   Savita Duncan 33 y.o.  30w5d. ALEXX: 2019, by Ultrasound      Reports good FM, no VB, LOF or contractions. Still very itchy, worse at night. On ursodiol now. Objective:   /69   Pulse 72   Temp 97.6 °F (36.4 °C) (Oral)   Resp 16   Ht 5' 2\" (1.575 m)   Wt 188 lb (85.3 kg)   LMP 2018   SpO2 98%   BMI 34.39 kg/m²     Physical Exam:  SEE FLOWSHEET    Labs  Recent Results (from the past 12 hour(s))   AMB POC URINALYSIS DIP STICK AUTO W/O MICRO    Collection Time: 19  1:04 PM   Result Value Ref Range    Color (UA POC) Asya     Clarity (UA POC) Slightly Cloudy     Glucose (UA POC) Negative Negative    Bilirubin (UA POC) 1+ Negative    Ketones (UA POC) Negative Negative    Specific gravity (UA POC) 1.015 1.001 - 1.035    Blood (UA POC) Trace Negative    pH (UA POC) 8.5 (A) 4.6 - 8.0    Protein (UA POC) Negative Negative    Urobilinogen (UA POC) 1 mg/dL 0.2 - 1    Nitrites (UA POC) Negative Negative    Leukocyte esterase (UA POC) Negative Negative         Assessment       ICD-10-CM ICD-9-CM    1. Encounter for supervision of other normal pregnancy in third trimester Z34.83 V22.1 AMB POC URINALYSIS DIP STICK AUTO W/O MICRO      METABOLIC PANEL, COMPREHENSIVE      BILE ACIDS, TOTAL   2. Pregnancy, unspecified gestational age Z27.80 V22.2    1. Intrahepatic cholestasis of pregnancy O26.619 646.70     K83.1 576.8    4. Obesity, unspecified classification, unspecified obesity type, unspecified whether serious comorbidity present E66.9 278.00      Plan   29yo  @ 35w5d  1. IUP: s/p flu and tdap, RH pos, AFP tetra neg, 3' GTT WNL, GBS pending   2. ICP: recheck LFTs and BA today also Hep C per MFM recs, scheduled for IOL on , pt to arrive on  for cervical ripening. Continue ursodiol.     3.  UTI: s/p tx with neg ROSS   4.  Obesity: growth 75th% (AC >95th%)        Orders Placed This Encounter    METABOLIC PANEL, COMPREHENSIVE    BILE ACIDS, TOTAL    AMB POC URINALYSIS DIP STICK AUTO W/O MICRO         Labor precautions discussed, including: Regular painful contractions, lasting for greater than one hour, taking your breath away; any vaginal bleeding; any leakage of fluid; or absent or decreased fetal movement. Call M.D. on call if any of these symptoms or signs occur. I have discussed the diagnosis with the patient and the intended plan as seen in the above orders. The patient has received an after-visit summary and questions were answered concerning future plans. I have discussed medication side effects and warnings with the patient as well. Informed pt to return to the office or go to the ER if she experiences vaginal bleeding, vaginal discharge, leaking of fluid, pelvic cramping.       Clementina Dahl, DO

## 2019-01-15 LAB — BACTERIA UR CULT: NORMAL

## 2019-01-15 NOTE — PROGRESS NOTES
Scheduled for IOL for IHCP 
AC > 95% tile Bile acids 93.5 Recommended ursodiol but patient had not picked up the rx

## 2019-01-16 ENCOUNTER — HOSPITAL ENCOUNTER (INPATIENT)
Age: 29
LOS: 3 days | Discharge: HOME | End: 2019-01-19
Attending: FAMILY MEDICINE | Admitting: FAMILY MEDICINE
Payer: SUBSIDIZED

## 2019-01-16 PROBLEM — Z37.9 NORMAL LABOR: Status: ACTIVE | Noted: 2019-01-16

## 2019-01-16 LAB
ALBUMIN SERPL-MCNC: 2.6 G/DL (ref 3.5–5)
ALBUMIN/GLOB SERPL: 0.6 {RATIO} (ref 1.1–2.2)
ALP SERPL-CCNC: 355 U/L (ref 45–117)
ALT SERPL-CCNC: 119 U/L (ref 12–78)
ANION GAP SERPL CALC-SCNC: 14 MMOL/L (ref 5–15)
AST SERPL-CCNC: 92 U/L (ref 15–37)
BASOPHILS # BLD: 0 K/UL (ref 0–0.1)
BASOPHILS NFR BLD: 0 % (ref 0–1)
BILIRUB SERPL-MCNC: 0.5 MG/DL (ref 0.2–1)
BUN SERPL-MCNC: 14 MG/DL (ref 6–20)
BUN/CREAT SERPL: 25 (ref 12–20)
C TRACH RRNA SPEC QL NAA+PROBE: NEGATIVE
CALCIUM SERPL-MCNC: 9.4 MG/DL (ref 8.5–10.1)
CHLORIDE SERPL-SCNC: 103 MMOL/L (ref 97–108)
CO2 SERPL-SCNC: 20 MMOL/L (ref 21–32)
CREAT SERPL-MCNC: 0.56 MG/DL (ref 0.55–1.02)
DIFFERENTIAL METHOD BLD: ABNORMAL
EOSINOPHIL # BLD: 0.1 K/UL (ref 0–0.4)
EOSINOPHIL NFR BLD: 1 % (ref 0–7)
ERYTHROCYTE [DISTWIDTH] IN BLOOD BY AUTOMATED COUNT: 13.7 % (ref 11.5–14.5)
GLOBULIN SER CALC-MCNC: 4.3 G/DL (ref 2–4)
GLUCOSE SERPL-MCNC: 72 MG/DL (ref 65–100)
HCT VFR BLD AUTO: 36.1 % (ref 35–47)
HCV AB S/CO SERPL IA: <0.1 S/CO RATIO (ref 0–0.9)
HGB BLD-MCNC: 11.7 G/DL (ref 11.5–16)
IMM GRANULOCYTES # BLD AUTO: 0 K/UL (ref 0–0.04)
IMM GRANULOCYTES NFR BLD AUTO: 1 % (ref 0–0.5)
LYMPHOCYTES # BLD: 2.4 K/UL (ref 0.8–3.5)
LYMPHOCYTES NFR BLD: 27 % (ref 12–49)
MCH RBC QN AUTO: 28 PG (ref 26–34)
MCHC RBC AUTO-ENTMCNC: 32.4 G/DL (ref 30–36.5)
MCV RBC AUTO: 86.4 FL (ref 80–99)
MONOCYTES # BLD: 0.6 K/UL (ref 0–1)
MONOCYTES NFR BLD: 7 % (ref 5–13)
N GONORRHOEA RRNA SPEC QL NAA+PROBE: NEGATIVE
NEUTS SEG # BLD: 5.7 K/UL (ref 1.8–8)
NEUTS SEG NFR BLD: 65 % (ref 32–75)
NRBC # BLD: 0 K/UL (ref 0–0.01)
NRBC BLD-RTO: 0 PER 100 WBC
PLATELET # BLD AUTO: 219 K/UL (ref 150–400)
PMV BLD AUTO: 13 FL (ref 8.9–12.9)
POTASSIUM SERPL-SCNC: 4.2 MMOL/L (ref 3.5–5.1)
PROT SERPL-MCNC: 6.9 G/DL (ref 6.4–8.2)
RBC # BLD AUTO: 4.18 M/UL (ref 3.8–5.2)
SODIUM SERPL-SCNC: 137 MMOL/L (ref 136–145)
SPECIMEN STATUS REPORT, ROLRST: NORMAL
WBC # BLD AUTO: 9 K/UL (ref 3.6–11)

## 2019-01-16 PROCEDURE — 3E033VJ INTRODUCTION OF OTHER HORMONE INTO PERIPHERAL VEIN, PERCUTANEOUS APPROACH: ICD-10-PCS | Performed by: OBSTETRICS & GYNECOLOGY

## 2019-01-16 PROCEDURE — 3E0P7VZ INTRODUCTION OF HORMONE INTO FEMALE REPRODUCTIVE, VIA NATURAL OR ARTIFICIAL OPENING: ICD-10-PCS | Performed by: OBSTETRICS & GYNECOLOGY

## 2019-01-16 PROCEDURE — 86787 VARICELLA-ZOSTER ANTIBODY: CPT

## 2019-01-16 PROCEDURE — 85025 COMPLETE CBC W/AUTO DIFF WBC: CPT

## 2019-01-16 PROCEDURE — 74011250637 HC RX REV CODE- 250/637: Performed by: OBSTETRICS & GYNECOLOGY

## 2019-01-16 PROCEDURE — 77010026065 HC OXYGEN MINIMUM MEDICAL AIR: Performed by: OBSTETRICS & GYNECOLOGY

## 2019-01-16 PROCEDURE — 59200 INSERT CERVICAL DILATOR: CPT | Performed by: OBSTETRICS & GYNECOLOGY

## 2019-01-16 PROCEDURE — 75410000000 HC DELIVERY VAGINAL/SINGLE: Performed by: OBSTETRICS & GYNECOLOGY

## 2019-01-16 PROCEDURE — 74011250637 HC RX REV CODE- 250/637: Performed by: FAMILY MEDICINE

## 2019-01-16 PROCEDURE — 36415 COLL VENOUS BLD VENIPUNCTURE: CPT

## 2019-01-16 PROCEDURE — 75410000003 HC RECOV DEL/VAG/CSECN EA 0.5 HR: Performed by: OBSTETRICS & GYNECOLOGY

## 2019-01-16 PROCEDURE — 75410000002 HC LABOR FEE PER 1 HR: Performed by: OBSTETRICS & GYNECOLOGY

## 2019-01-16 PROCEDURE — 76060000078 HC EPIDURAL ANESTHESIA: Performed by: NURSE ANESTHETIST, CERTIFIED REGISTERED

## 2019-01-16 PROCEDURE — 80053 COMPREHEN METABOLIC PANEL: CPT

## 2019-01-16 PROCEDURE — 65270000029 HC RM PRIVATE

## 2019-01-16 RX ORDER — OXYTOCIN/0.9 % SODIUM CHLORIDE 30/500 ML
0-25 PLASTIC BAG, INJECTION (ML) INTRAVENOUS
Status: DISCONTINUED | OUTPATIENT
Start: 2019-01-17 | End: 2019-01-16

## 2019-01-16 RX ORDER — CALCIUM CARBONATE 200(500)MG
400 TABLET,CHEWABLE ORAL
Status: DISCONTINUED | OUTPATIENT
Start: 2019-01-16 | End: 2019-01-19 | Stop reason: HOSPADM

## 2019-01-16 RX ORDER — SODIUM CHLORIDE 0.9 % (FLUSH) 0.9 %
5-40 SYRINGE (ML) INJECTION EVERY 8 HOURS
Status: DISCONTINUED | OUTPATIENT
Start: 2019-01-16 | End: 2019-01-18 | Stop reason: ALTCHOICE

## 2019-01-16 RX ORDER — ZOLPIDEM TARTRATE 5 MG/1
5 TABLET ORAL
Status: DISCONTINUED | OUTPATIENT
Start: 2019-01-16 | End: 2019-01-19 | Stop reason: HOSPADM

## 2019-01-16 RX ORDER — OXYTOCIN/0.9 % SODIUM CHLORIDE 30/500 ML
0-25 PLASTIC BAG, INJECTION (ML) INTRAVENOUS
Status: DISCONTINUED | OUTPATIENT
Start: 2019-01-17 | End: 2019-01-17 | Stop reason: HOSPADM

## 2019-01-16 RX ORDER — SODIUM CHLORIDE 0.9 % (FLUSH) 0.9 %
5-40 SYRINGE (ML) INJECTION AS NEEDED
Status: DISCONTINUED | OUTPATIENT
Start: 2019-01-16 | End: 2019-01-19 | Stop reason: HOSPADM

## 2019-01-16 RX ADMIN — ANTACID TABLETS 400 MG: 500 TABLET, CHEWABLE ORAL at 20:21

## 2019-01-16 RX ADMIN — ZOLPIDEM TARTRATE 5 MG: 5 TABLET ORAL at 19:29

## 2019-01-16 RX ADMIN — DINOPROSTONE 10 MG: 10 INSERT VAGINAL at 16:53

## 2019-01-16 RX ADMIN — ANTACID TABLETS 400 MG: 500 TABLET, CHEWABLE ORAL at 18:21

## 2019-01-16 NOTE — PROGRESS NOTES
[address] [facility] Residency Attending Addendum: 
 
I was NOT present with the resident during the interview & examination of the patient. I personally interviewed the patient & repeated the critical or key portions of the exam.  I agree with the resident's note with the following additions: Hx: Patient presents for IOL 2/2 ICP. Good FM, no concerns. Exam:  
VS:  
Patient Vitals for the past 4 hrs: 
 Pulse BP  
19 1547 84 111/70 GEN: NAD, lying in bed comfortably SVE: closed/posterior FHT: 150s, mod gris, +accels, no decels TOCO: irritability Cephalic by bedside sono Assessment/Plan: 
29yo  @ 36.0 1. IOL: 2/2 ICP, last BA 93.5 and LFTs in 100's on  (repeat pending). Cephalic by sono, cervidil placed and pit ordered for 5am with GBS ppx (for pending GBS status). 2.  IUP: s/p flu and tdap, RH pos, AFP tetra neg, 3' GTT WNL 3.  UTI: s/p tx with neg ROSS 4.  Obesity: growth 75th% (AC >95th%) Signed out to [facility] hospitalist for the night at 5:05pm.  
 
Eddie Stout DO 2019

## 2019-01-16 NOTE — H&P
History & Physical 
 
Name: Joaquina Farrar MRN: [id number]  SSN: [id number] YOB: 1990  Age: 30 y.o. Sex: female Subjective:  
 
Reason for Admission:  Pregnancy and Intrahepatic Cholestasis History of Present Illness: Ms. Lorie Pugh is a 30 y.o.  female with an estimated gestational age of 44w0d with Estimated Date of Delivery: 19. Patient complains of persistent pruritus but otherwise denies contractions, vaginal bleeding,  SOB,chest pain or n/v. Pt states she feels baby moving and otherwise has no concerns. OB History  Para Term  AB Living 3 2 2 0 0 2 SAB TAB Ectopic Molar Multiple Live Births  
0 0 0     2 # Outcome Date GA Lbr Mauricio/2nd Weight Sex Delivery Anes PTL Lv  
3 Current 2 Term 11/21/15 37w4d  7 lb 1.9 oz (3.23 kg) M VAGINAL DELI EPIDURAL AN N RAQUEL  
1 Term  37w0d   F Vag-Spont EPI N RAQUEL Obstetric Comments S/p  Past Medical History:  
Diagnosis Date  Cholestasis during pregnancy 2018  Cholestasis of pregnancy in third trimester 2015  Constipation  Gestational diabetes 2015  Hypertension   
 possibly per pt report in [place]. Denied it 5/5/15 No past surgical history on file. Social History Occupational History  Not on file Tobacco Use  Smoking status: Never Smoker  Smokeless tobacco: Never Used Substance and Sexual Activity  Alcohol use: No  
 Drug use: No  
 Sexual activity: Yes  
  Partners: Male Birth control/protection: None, Condom Family History Problem Relation Age of Onset  Diabetes Mother No Known Allergies Prior to Admission medications Medication Sig Start Date End Date Taking? Authorizing Provider  
ursodiol (ACTIGALL) 250 mg tablet Take 1 Tab by mouth three (3) times daily.  19  Yes Priscilla Hernandez MD  
hydrOXYzine HCl (ATARAX) 10 mg tablet TAKE 1 TAB BY MOUTH THREE (3) TIMES DAILY AS NEEDED FOR ITCHING FOR UP TO 10 DAYS. 12/28/18  Yes Provider, Historical  
raNITIdine (ZANTAC) 150 mg tablet Take 2 Tabs by mouth two (2) times a day. 12/5/18  Yes Va Patel MD  
pyridoxine, vitamin B6, (VITAMIN B-6) 25 mg tablet Take 1 Tab by mouth three (3) times daily as needed. 6/30/18  Yes Va Patel MD  
prenatal vit no.112-folate no6 1 mg chew Take 1 Tab by mouth daily. 6/13/18  Yes Flakito Nichole MD  
docusate sodium (COLACE) 100 mg capsule Take 1 Cap by mouth two (2) times a day for 90 days. 10/31/18 1/29/19  Brandyn Rooney MD  
psyllium seed-sucrose (METAMUCIL, SUGAR,) powd Take 1 teaspoon three times a day with meals. 8/22/17   Delvin Mccauley MD  
polyethylene glycol [facility]) 17 gram packet Take 1 Packet by mouth daily as needed. 8/22/17   Delvin Mccauley MD  
  
 
Review of Systems: 
Constitutional: negative for fevers, chills and sweats Cardiovascular: negative for chest pain, chest pressure/discomfort, dyspnea, palpitations, near-syncope, lower extremity edema Gastrointestinal: negative for nausea, vomiting, diarrhea, abdominal pain and jaundice Neurological: negative for headaches and vision changes Objective:  
 
Vitals:   
Vitals:  
 01/16/19 1547 01/16/19 1555 BP: 111/70 Pulse: 84 Weight:  188 lb (85.3 kg) Height:  5' 2\" (1.575 m) No data recorded. BP  Min: 111/70  Max: 111/70 Physical Exam: 
Patient without distress. Heart: Regular rate and rhythm, S1S2 present or without murmur or extra heart sounds Lung: clear to auscultation throughout lung fields, no wheezes, no rales, no rhonchi and normal respiratory effort Abdomen: soft, nontender, gravid Fundus: soft and non tender Cervical Exam: closed Lower Extremities: no edema Membranes:  Intact Uterine Activity:  None Fetal Heart Rate:  Reactive Baseline: 150s per minute Variability: moderate Accelerations: yes Decelerations: none Uterine contractions: none Lab/Data Review: No results found for this or any previous visit (from the past 24 hour(s)). Assessment and Plan: Ms. Aminta Ervin is a 30 y.o.   female with an estimated gestational age of 44w0d who is admitted for induction 2/2 ICP. Prenatal Immunizations and vaccination: O+, Ab screen neg, G/C neg, Rubella immune, HepBsAg neg, Tpal neg, HIV NR, AFP Tetra neg S/p influenza and Tdap vaccine, passed 3 hr GTT, GBS pending 1. IOL  
- Admit for induction with cervidil - Follow up CMP,CBC 
- GBS pending ,will give PCN ppx - Will start Pitocin at 5:00am with PCN 
- Beside ultrasound : confirmed cephalic  
- SVE: closed, posterior 
- NPO at midnight -  Anticipate vaginal delivery 2. ICP Bile acids : 93.5 on ,  Alk phosphate 249, on  RUQ US on : Sludge within the gallbladder but otherwise unremarkable - Follow up CMP, CBC 3. Hx of UTI (2018, 2018) Urine cultures have since been negative 4. Obesity Third trimester U/S: EFW at 75th percentile. ELHAM 16cm Patient to be discussed with Dr. Jose Angel Conklin MD 
Family Medicine Resident

## 2019-01-16 NOTE — PROGRESS NOTES
1655: Cervidil placed by Dr Paco Beaulieu and Dr Brea Kang. Orders received to remove Cervidil at 4am, let patient shower, and then start pit at 5am. GBS has not resulted, so Penicillin to be given with pitocin in AM.  
1850: Bedside and Verbal shift change report given to Wilfredo Park 33 (oncoming nurse) by Svitlana Collier RN (offgoing nurse). Report included the following information SBAR, Kardex and Recent Results.

## 2019-01-17 ENCOUNTER — ANESTHESIA EVENT (OUTPATIENT)
Dept: LABOR AND DELIVERY | Age: 29
End: 2019-01-17
Payer: SUBSIDIZED

## 2019-01-17 ENCOUNTER — ANESTHESIA (OUTPATIENT)
Dept: LABOR AND DELIVERY | Age: 29
End: 2019-01-17
Payer: SUBSIDIZED

## 2019-01-17 LAB
ALBUMIN SERPL-MCNC: 3.7 G/DL (ref 3.5–5.5)
ALBUMIN/GLOB SERPL: 1.1 {RATIO} (ref 1.2–2.2)
ALP SERPL-CCNC: 337 IU/L (ref 39–117)
ALT SERPL-CCNC: 115 IU/L (ref 0–32)
AST SERPL-CCNC: 87 IU/L (ref 0–40)
BILE AC SERPL-SCNC: 15.7 UMOL/L (ref 4.7–24.5)
BILIRUB SERPL-MCNC: 0.4 MG/DL (ref 0–1.2)
BUN SERPL-MCNC: 10 MG/DL (ref 6–20)
BUN/CREAT SERPL: 15 (ref 9–23)
CALCIUM SERPL-MCNC: 9.7 MG/DL (ref 8.7–10.2)
CHLORIDE SERPL-SCNC: 101 MMOL/L (ref 96–106)
CO2 SERPL-SCNC: 19 MMOL/L (ref 20–29)
CREAT SERPL-MCNC: 0.65 MG/DL (ref 0.57–1)
GLOBULIN SER CALC-MCNC: 3.3 G/DL (ref 1.5–4.5)
GLUCOSE SERPL-MCNC: 78 MG/DL (ref 65–99)
POTASSIUM SERPL-SCNC: 4.6 MMOL/L (ref 3.5–5.2)
PROT SERPL-MCNC: 7 G/DL (ref 6–8.5)
SODIUM SERPL-SCNC: 138 MMOL/L (ref 134–144)

## 2019-01-17 PROCEDURE — 77030011943

## 2019-01-17 PROCEDURE — 74011000250 HC RX REV CODE- 250

## 2019-01-17 PROCEDURE — 10907ZC DRAINAGE OF AMNIOTIC FLUID, THERAPEUTIC FROM PRODUCTS OF CONCEPTION, VIA NATURAL OR ARTIFICIAL OPENING: ICD-10-PCS | Performed by: OBSTETRICS & GYNECOLOGY

## 2019-01-17 PROCEDURE — 74011250637 HC RX REV CODE- 250/637: Performed by: FAMILY MEDICINE

## 2019-01-17 PROCEDURE — 74011250636 HC RX REV CODE- 250/636: Performed by: FAMILY MEDICINE

## 2019-01-17 PROCEDURE — 74011000258 HC RX REV CODE- 258: Performed by: FAMILY MEDICINE

## 2019-01-17 PROCEDURE — 77030021125

## 2019-01-17 PROCEDURE — 65270000029 HC RM PRIVATE

## 2019-01-17 PROCEDURE — 3E0R3BZ INTRODUCTION OF ANESTHETIC AGENT INTO SPINAL CANAL, PERCUTANEOUS APPROACH: ICD-10-PCS | Performed by: ANESTHESIOLOGY

## 2019-01-17 PROCEDURE — 74011250636 HC RX REV CODE- 250/636: Performed by: ANESTHESIOLOGY

## 2019-01-17 PROCEDURE — 74011250636 HC RX REV CODE- 250/636: Performed by: OBSTETRICS & GYNECOLOGY

## 2019-01-17 PROCEDURE — 00HU33Z INSERTION OF INFUSION DEVICE INTO SPINAL CANAL, PERCUTANEOUS APPROACH: ICD-10-PCS | Performed by: ANESTHESIOLOGY

## 2019-01-17 PROCEDURE — 77030014125 HC TY EPDRL BBMI -B: Performed by: NURSE ANESTHETIST, CERTIFIED REGISTERED

## 2019-01-17 PROCEDURE — 74011250637 HC RX REV CODE- 250/637: Performed by: STUDENT IN AN ORGANIZED HEALTH CARE EDUCATION/TRAINING PROGRAM

## 2019-01-17 PROCEDURE — 77030018749 HC HK AMNIO DISP DERY -A

## 2019-01-17 PROCEDURE — 75410000002 HC LABOR FEE PER 1 HR: Performed by: OBSTETRICS & GYNECOLOGY

## 2019-01-17 RX ORDER — ONDANSETRON 2 MG/ML
4 INJECTION INTRAMUSCULAR; INTRAVENOUS
Status: DISCONTINUED | OUTPATIENT
Start: 2019-01-17 | End: 2019-01-19 | Stop reason: HOSPADM

## 2019-01-17 RX ORDER — OXYTOCIN/RINGER'S LACTATE 20/1000 ML
125-500 PLASTIC BAG, INJECTION (ML) INTRAVENOUS ONCE
Status: ACTIVE | OUTPATIENT
Start: 2019-01-17 | End: 2019-01-18

## 2019-01-17 RX ORDER — BUPIVACAINE HYDROCHLORIDE 2.5 MG/ML
INJECTION, SOLUTION EPIDURAL; INFILTRATION; INTRACAUDAL AS NEEDED
Status: DISCONTINUED | OUTPATIENT
Start: 2019-01-17 | End: 2019-01-17 | Stop reason: HOSPADM

## 2019-01-17 RX ORDER — HYDROCORTISONE ACETATE PRAMOXINE HCL 2.5; 1 G/100G; G/100G
CREAM TOPICAL AS NEEDED
Status: DISCONTINUED | OUTPATIENT
Start: 2019-01-17 | End: 2019-01-19 | Stop reason: HOSPADM

## 2019-01-17 RX ORDER — NALOXONE HYDROCHLORIDE 0.4 MG/ML
0.4 INJECTION, SOLUTION INTRAMUSCULAR; INTRAVENOUS; SUBCUTANEOUS AS NEEDED
Status: DISCONTINUED | OUTPATIENT
Start: 2019-01-17 | End: 2019-01-19 | Stop reason: HOSPADM

## 2019-01-17 RX ORDER — SODIUM CHLORIDE, SODIUM LACTATE, POTASSIUM CHLORIDE, CALCIUM CHLORIDE 600; 310; 30; 20 MG/100ML; MG/100ML; MG/100ML; MG/100ML
999 INJECTION, SOLUTION INTRAVENOUS ONCE
Status: COMPLETED | OUTPATIENT
Start: 2019-01-17 | End: 2019-01-17

## 2019-01-17 RX ORDER — LIDOCAINE HYDROCHLORIDE AND EPINEPHRINE 15; 5 MG/ML; UG/ML
INJECTION, SOLUTION EPIDURAL
Status: COMPLETED | OUTPATIENT
Start: 2019-01-17 | End: 2019-01-17

## 2019-01-17 RX ORDER — IBUPROFEN 800 MG/1
800 TABLET ORAL EVERY 8 HOURS
Status: DISCONTINUED | OUTPATIENT
Start: 2019-01-17 | End: 2019-01-19 | Stop reason: HOSPADM

## 2019-01-17 RX ORDER — FENTANYL/BUPIVACAINE/NS/PF 2-1250MCG
1-16 PREFILLED PUMP RESERVOIR EPIDURAL CONTINUOUS
Status: DISCONTINUED | OUTPATIENT
Start: 2019-01-17 | End: 2019-01-19 | Stop reason: HOSPADM

## 2019-01-17 RX ORDER — SODIUM CHLORIDE 0.9 % (FLUSH) 0.9 %
5-40 SYRINGE (ML) INJECTION AS NEEDED
Status: DISCONTINUED | OUTPATIENT
Start: 2019-01-17 | End: 2019-01-19 | Stop reason: HOSPADM

## 2019-01-17 RX ORDER — SODIUM CHLORIDE, SODIUM LACTATE, POTASSIUM CHLORIDE, CALCIUM CHLORIDE 600; 310; 30; 20 MG/100ML; MG/100ML; MG/100ML; MG/100ML
125 INJECTION, SOLUTION INTRAVENOUS CONTINUOUS
Status: DISCONTINUED | OUTPATIENT
Start: 2019-01-17 | End: 2019-01-18 | Stop reason: ALTCHOICE

## 2019-01-17 RX ORDER — SODIUM CHLORIDE 0.9 % (FLUSH) 0.9 %
5-40 SYRINGE (ML) INJECTION EVERY 8 HOURS
Status: DISCONTINUED | OUTPATIENT
Start: 2019-01-17 | End: 2019-01-18 | Stop reason: ALTCHOICE

## 2019-01-17 RX ORDER — DOCUSATE SODIUM 100 MG/1
100 CAPSULE, LIQUID FILLED ORAL
Status: DISCONTINUED | OUTPATIENT
Start: 2019-01-17 | End: 2019-01-19 | Stop reason: HOSPADM

## 2019-01-17 RX ORDER — NALBUPHINE HYDROCHLORIDE 10 MG/ML
10 INJECTION, SOLUTION INTRAMUSCULAR; INTRAVENOUS; SUBCUTANEOUS
Status: DISCONTINUED | OUTPATIENT
Start: 2019-01-17 | End: 2019-01-19 | Stop reason: HOSPADM

## 2019-01-17 RX ADMIN — PENICILLIN G POTASSIUM 2.5 MILLION UNITS: 20000000 POWDER, FOR SOLUTION INTRAVENOUS at 09:00

## 2019-01-17 RX ADMIN — PENICILLIN G POTASSIUM 2.5 MILLION UNITS: 20000000 POWDER, FOR SOLUTION INTRAVENOUS at 13:16

## 2019-01-17 RX ADMIN — OXYTOCIN-SODIUM CHLORIDE 0.9% IV SOLN 30 UNIT/500ML 2 MILLI-UNITS/MIN: 30-0.9/5 SOLUTION at 05:00

## 2019-01-17 RX ADMIN — ANTACID TABLETS 400 MG: 500 TABLET, CHEWABLE ORAL at 06:49

## 2019-01-17 RX ADMIN — OXYTOCIN-SODIUM CHLORIDE 0.9% IV SOLN 30 UNIT/500ML 8 MILLI-UNITS/MIN: 30-0.9/5 SOLUTION at 06:45

## 2019-01-17 RX ADMIN — Medication 10 ML/HR: at 12:07

## 2019-01-17 RX ADMIN — SODIUM CHLORIDE 5 MILLION UNITS: 900 INJECTION, SOLUTION INTRAVENOUS at 04:54

## 2019-01-17 RX ADMIN — LIDOCAINE HYDROCHLORIDE AND EPINEPHRINE 3 ML: 15; 5 INJECTION, SOLUTION EPIDURAL at 11:58

## 2019-01-17 RX ADMIN — SODIUM CHLORIDE, SODIUM LACTATE, POTASSIUM CHLORIDE, AND CALCIUM CHLORIDE 999 ML/HR: 600; 310; 30; 20 INJECTION, SOLUTION INTRAVENOUS at 00:16

## 2019-01-17 RX ADMIN — SODIUM CHLORIDE, SODIUM LACTATE, POTASSIUM CHLORIDE, AND CALCIUM CHLORIDE 1000 ML: 600; 310; 30; 20 INJECTION, SOLUTION INTRAVENOUS at 10:14

## 2019-01-17 RX ADMIN — SODIUM CHLORIDE, SODIUM LACTATE, POTASSIUM CHLORIDE, AND CALCIUM CHLORIDE 125 ML/HR: 600; 310; 30; 20 INJECTION, SOLUTION INTRAVENOUS at 04:54

## 2019-01-17 RX ADMIN — BUPIVACAINE HYDROCHLORIDE 10 ML: 2.5 INJECTION, SOLUTION EPIDURAL; INFILTRATION; INTRACAUDAL at 12:00

## 2019-01-17 RX ADMIN — OXYTOCIN-SODIUM CHLORIDE 0.9% IV SOLN 30 UNIT/500ML 4 MILLI-UNITS/MIN: 30-0.9/5 SOLUTION at 05:30

## 2019-01-17 RX ADMIN — OXYTOCIN-SODIUM CHLORIDE 0.9% IV SOLN 30 UNIT/500ML 6 MILLI-UNITS/MIN: 30-0.9/5 SOLUTION at 06:00

## 2019-01-17 RX ADMIN — NALBUPHINE HYDROCHLORIDE 10 MG: 10 INJECTION, SOLUTION INTRAMUSCULAR; INTRAVENOUS; SUBCUTANEOUS at 00:12

## 2019-01-17 RX ADMIN — SODIUM CHLORIDE, SODIUM LACTATE, POTASSIUM CHLORIDE, AND CALCIUM CHLORIDE 500 ML: 600; 310; 30; 20 INJECTION, SOLUTION INTRAVENOUS at 13:53

## 2019-01-17 RX ADMIN — IBUPROFEN 800 MG: 800 TABLET ORAL at 20:12

## 2019-01-17 NOTE — PROGRESS NOTES
1850 Bedside SBAR report received from [address] and transfer of care accepted at this time. 1918 Pt requesting sleep medication. 1929 Pt given 5mg Ambien PO at this time for sleep. 2021 Pt given 400mg Tums for heartburn at this time. 2125 Pt ambulated to BR at this time . 
 
2128 Pt back to bed. TOCO and US readjusted at this time. 2230 Pt given linens for sleep at this time. Pt states her sleeping medication is not working but denies the need for any other medication at this time. 0000 Pt called out stating she had lower abdominal cramping and was this normal. Discussed with pt that she is currently christopher every 6-8 minutes and that abdominal pain is normal. Pt requesting pain medication at this time. SVE performed. Pt 1/50/-3. Discussed with pt about given IV pain medication at this time. Pt agrees to 1815 [facility]. Using [place] as . 0012 Pt given nubain 10mg IVP for pain at this time. 0040 Pt resting quietly with eyes closed. Pt states 2/10 pain at this time. 0123 Maternal heart rate tracing at this time monitor tilted off the pt. Readjusted monitor and TOCO at this time. Pt denies pain at this time. 0200 Pt resting quietly with eyes closed bilateral expansion of pt chest noted at this time. Hourly rounding completed. 0300 Pt resting quietly with eyes closed bilateral expansion of pt chest noted at this time. Hourly rounding completed. 0415 Cervidil removed and pt up to shower at this time. Bed changed. SVE 1/50/-2. 
 
0440 Pt placed back on EFM and TOCO. 0455 PCN 5million units started at this time. 0500 Pitocin started at [facility] units per hour started for induction of labor. 0530 Pitocin increased to 4millin units per hour at this time. Pt states she has more pain but denies the need for pain medication at this time. 0600 Pitocin increased to 6milli units per hour. 0645 Pt ambulated to BR at thist time . Pitocin increased to 8milli units per hour. 0650 400mg TUMS given PO. 
 
[id number] Bedside SBAR report given to Russ Saab and transfer of care given at this time.

## 2019-01-17 NOTE — PROGRESS NOTES
Labor Progress Note Patient seen, fetal heart rate and contraction pattern evaluated, patient examined. Physical Exam: 
Cervical Exam:  Cervical Exam 
Dilation (cm): 2 Eff: 60 % Station: -3 Position: posterior Cervical Consistency: Soft Vaginal exam done by? : [facility] Membrane Status: Intact Membranes:  Intact Uterine Activity: Ctx every 4 minutes Fetal Heart Rate: Baseline 140, moderate variability, +acels, no decels Assessment/Plan: 
 
IUP at 36w1d: O+, neg antibody screen, GBS pending, HBsAg neg, G/C & Chlamydia neg, Rubella immune, HIV neg, TPal neg, Rubella immune, Varicella pending IHCP: Patient with elevated Bile acids 93.5 on 1/4 --> 15.7 on 1/14 with elevated LFTs. Patient treated with ursodiol outpatient with atarax PRN for itching. Patient being induced per [facility] recommendations due to risk of fetal mortality. IOL: Cervidil removed at 4:15 AM. Cervix 2/60/-3 at 8AM. Patient christopher every 4 minutes on Pitocin at rate of 12 milliunits/min. FHT Cat 1. GBS unknown status: Continue tx with PCN. Pain management: Patient receiving epidural now. Goldie Bhatia MD 
Family Medicine Resident

## 2019-01-17 NOTE — ANESTHESIA PROCEDURE NOTES
Epidural Block Start time: 1/17/2019 11:52 AM 
End time: 1/17/2019 12:07 PM 
Performed by: Hayley Smyth CRNA Authorized by: Hayley Smyth CRNA Pre-Procedure Indication: labor epidural   
Preanesthetic Checklist: patient identified, risks and benefits discussed, anesthesia consent, timeout performed and anesthesia consent Timeout Time: 11:56 Epidural:  
Patient position:  Seated Prep region:  Lumbar Prep: Betadine Location:  L3-4 Needle and Epidural Catheter:  
Needle Type:  Tuohy Needle Gauge:  17 G Injection Technique:  Loss of resistance using air Attempts:  1 Catheter Size:  18 G Events: no paresthesia and negative aspiration test   
Test Dose:  Lidocaine 1.5% w/ epi and negative Assessment:  
Catheter Secured:  Tegaderm and tape Insertion:  Uncomplicated Patient tolerance:  Patient tolerated the procedure well with no immediate complications

## 2019-01-17 NOTE — PROGRESS NOTES
Problem: Lactation Care Plan Goal: *Infant latching appropriately Outcome: Progressing Towards Goal 
Pt will successfully establish breastfeeding by feeding in response to infant's early feeding cues and/or to offer breast every 2-3 hours. Ways to obtain a deep latch and seek comfortable positioning shared, aware to keep log of feedings/output. Goal: *Weight loss less than 10% of birth weight Outcome: Progressing Towards Goal 
 
Encouraged mom to attempt feeding with baby led feeding cues. Just as sucking on fingers, rooting, mouthing. Looking for 8-12 feedings in 24 hours. Don't limit baby at breast, allow baby to come of breast on it's own. Baby may want to feed  often and may increase number of feedings on second day of life. Skin to skin encouraged. If baby doesn't nurse,  Mom should  hand express  10-20 drops of colostrum and drip into baby's mouth, or give to baby by finger feeding, cup feeding, or spoon feeding at least every 2-3 hours. Problem: Patient Education: Go to Patient Education Activity Goal: Patient/Family Education Outcome: Progressing Towards Goal 
Discussed with mother her plan for feeding. Reviewed the benefits of exclusive breast milk feeding during the hospital stay. Informed her of the risks of using formula to supplement in the first few days of life as well as the benefits of successful breast milk feeding; referred her to the Breastfeeding booklet about this information. She acknowledges understanding of information reviewed and states that it is her plan to breast/formula feed her infant. Will support her choice and offer additional information as needed. Hand Expression Education:  Mom taught how to manually hand express her colostrum. Emphasized the importance of providing infant with valuable colostrum as infant rests skin to skin at breast.  Aware to avoid extended periods of non-feeding.   Aware to offer 10-20+ drops of colostrum every 2-3 hours until infant is latching and nursing effectively. Taught the rationale behind this low tech but highly effective evidence based practice. Reviewed effects/risks of late  birth on initiation of breastfeeding including infant's sleepiness, ineffective or missed breastfeedings, infant's decreased stamina to sustain prolonged latch and effective breastfeeding, decreased energy reserves related to low birth wt and inability to stimulate milk supply. Recommended interventions include skin to skin bonding at breast, hand expression of colostrum as infant rests at breast and initiation of breastfeeding as infant is able, initiation of pumping regimen to begin within 6 hours of birth as mom is able; complement/supplement feeding as guided by neonatologist.  
 
Pt chooses to do both breast and bottle. Discussed effects of early supplementation on breastfeeding success; may decrease breastmilk production and supply, increase risk for pathological engorgement, baby may develop preference for faster flow from bottles vs breast, and baby's stomach can be stretched if larger volumes of formula are given. Comments: Pt will successfully establish breastfeeding by feeding in response to early feeding cues  
or wake every 3h, will obtain deep latch, and will keep log of feedings/output. Taught to BF at hunger cues and or q 2-3 hrs and to offer 10-20 drops of hand expressed colostrum at any non-feeds. Breast Assessment Left Breast: Medium Left Nipple: Flat, Intact, Short Right Breast: Medium Right Nipple: Flat, Intact, Short Breast- Feeding Assessment Attends Breast-Feeding Classes: No 
Breast-Feeding Experience: Yes(Blended feedings with first 2 babies. Breast fed them x 1 month. Plans to breast/formula feed her new baby. Instructed mother to offer baby breast 1st so baby can get her antibodies. ) Breast Trauma/Surgery: No 
Type/Quality: Attempted Lactation Consultant Visits Breast-Feedings: Attempted breast-feeding(Baby born at 36.1 wk. Mother has flat nipples. Attempted breastfeeding for 15 min. Tried latch assist and shield but baby would not suckle. Unable to express drops. Baby placed STS. Nursery informed.) Mother/Infant Observation Mother Observation: Alignment, Close hold, Holds breast 
Infant Observation: Opens mouth LATCH Documentation Latch: (attempted breastfeeding. Baby sleepy /not interested)

## 2019-01-17 NOTE — PROGRESS NOTES
3813 - Bedside shift change report given to Carley Presley RN (oncoming nurse) by Obdulio Hernandez RN (offgoing nurse). Report included the following information SBAR, Kardex, Procedure Summary, Intake/Output, MAR, Accordion, Recent Results and Med Rec Status. Patient in bed resting. Pitocin running at 8 milliunits/min. 1st dose of penicillin given this AM at 0500, 2nd dose due at 0900.  used to explain plan of care: [phone] - Resident at bedside at this time reviewing plan of care. Expressed concerns that we were monitoring the fetal HR a little high on the patient's abdomen and if the patient needed an ultrasound for confirmation of position. Resident explained that the patient had an ultrasound just yesterday. 5011 - Dr. Chaim Hodge called to ask for update on epidural. Patient doesn't want one at this time, Dr. Chaim Hodge encouraged for patient to wait until 4cm before epidural.  
 
8867 - Dr. Herminio Rodríguez at bedside, cervical check 2cm/60%/-3, posterior. Encouraged to wait for epidural.  
0840 - Talked with patient about epidural, patient states she is ok to wait for epidural until dilated more, contractions not too intense at this time. Updated patient to call out if contractions get too intense and need epidural.  
0900 - 2nd dose of penicillin given at this time. 1014 - Hourly rounds with patient, asked about pain. Patient states she is in a lot of pain from the contractions and wants to go ahead and start the bolus for epidural. Starting bolus for epidural.  
1115 - Epidural bolus finished, anesthesia currently helping another patient at this time, will wait until anesthesia available. 1154 - Epidural timeout with Dr. Whitehead Apt 1215 - Patient more comfortable after epidural 
1230 - Dr. Herminio Rodríguez at bedside, cervical check 3cm, 60, -2. AROM at this time, clear blood tinged fluid. Dr. Herminio Rodríguez request to increase pitocin. 1310 - Turning patient to right side with peanut ball. [address] [facility] 600 left lateral with peanut ball. 1349 - Call received from Dr. Apolonio Frankel, update that patient is in left lateral with peanut ball and T karen. Not increasing pitocin, monitoring. Will continue to monitor. 1351 - Decreasing pitocin to 12, variable decelerations. 1356 - Stopping pitocin. 56 - Dr. Apolonio Frankel at bedside, patient says she is feeling some pressure. Cervical check 7cm/100/0.  
 
1401 - Patient states that she feels like the baby is coming, this nurse can see baby , pulling emergency cord and instructing patient not to push. [id number] - Dr. Apolonio Frankel arrives,  
8842 - Dr. Maria C Samuels arrives 26 - [place] delivered by Dr. Apolonio Frankel and Dr. Maria C Samuels 1409 - Placenta delivered, apgars 9/9.  
1420 - QBL 113ml, no tears. Refer to delivery summary. 1440 - Patient in bed, breastfeeding, giving patient menu. 0257 - Patient in bed, straight cathing patient, patient still very numb and can't get up to restroom. Was unable to straight cath before or at delivery after bolus, will straight cath now to avoid increased bleeding. 1704 - Patient's right leg still very numb, patient states that she doesn't need to use the restroom yet. Will go ahead and proceed with transfer. Fundus firm, small bleeding. 1739 - TRANSFER - OUT REPORT: 
 
Verbal report given to Laura Bush RN (name) on Joseph Shelton  being transferred to MIU (unit) for routine progression of care Report consisted of patients Situation, Background, Assessment and  
Recommendations(SBAR). Information from the following report(s) SBAR, Kardex, Procedure Summary, Intake/Output, MAR, Accordion, Recent Results and Med Rec Status was reviewed with the receiving nurse. Lines:  
Peripheral IV 89/64/27 Left Cephalic (Active) Site Assessment Clean, dry, & intact 2019 12:14 PM  
Phlebitis Assessment 0 2019 12:14 PM  
Infiltration Assessment 0 2019 12:14 PM  
 Dressing Status Clean, dry, & intact 1/17/2019 12:14 PM  
Dressing Type Transparent;Tape 1/17/2019 12:14 PM  
Hub Color/Line Status Pink 1/17/2019 12:14 PM  
Action Taken Blood drawn 1/16/2019  5:36 PM  
Alcohol Cap Used Yes 1/17/2019 12:14 PM  
  
 
Opportunity for questions and clarification was provided. Patient transported with: 
 [facility]istered [facility].

## 2019-01-17 NOTE — L&D DELIVERY NOTE
Delivery Summary    Patient: Jessica Dangelo MRN: [id number]  SSN: [id number]    YOB: 1990  Age: 30 y.o. Sex: female       Information for the patient's :  Reva Solorzano, Jessica Wilson [[id number]]       Labor Events:    Labor: No   Rupture Date: 2019   Rupture Time: 12:30 PM   Rupture Type AROM   Amniotic Fluid Volume:      Amniotic Fluid Description: Blood stained     Induction: Oxytocin;Prostaglandins       Augmentation: AROM   Labor Events: None     Cervical Ripenin2019 4:55 PM Cervidil     Delivery Events:  Episiotomy: None   Laceration(s): None     Repaired: None    Number of Repair Packets:     Suture Type and Size: None     Estimated Blood Loss (ml):  250 ml       Delivery Date: 2019    Delivery Time: 2:04 PM  Delivery Type: Vaginal, Spontaneous  Sex:  Male     Gestational Age: 43w3d   Delivery Clinician:  Donald Vázquez  Living Status: Living   Delivery Location: L&D            APGARS  One minute Five minutes Ten minutes   Skin color: 1   1        Heart rate: 2   2        Grimace: 2   2        Muscle tone: 2   2        Breathin   2        Totals: 9   9            Presentation: Vertex    Position:   Occiput Anterior  Resuscitation Method:  Tactile Stimulation     Meconium Stained: None      Cord Information: 3 Vessels  Complications: Nuchal Cord With Compressions  Cord around: head  Delayed cord clamping? Yes  Cord clamped date/time:2019  2:06 PM  Disposition of Cord Blood: Lab    Blood Gases Sent?: No    Placenta:  Date/Time: 2019  2:09 PM  Removal: Spontaneous      Appearance: Normal;Intact     [place] Measurements:  Birth Weight:        Birth Length:        Head Circumference:        Chest Circumference:       Abdominal Girth:       Other Providers:   Sina SANTORO;TASHI, GEORGE;COPPLER, DEEPTI R;KAYLA, DAJUAN Winda Ding LANDYNN;PRAVEENA RODARTE, Obstetrician;Primary Nurse;Primary [place] Nurse;Student Nurse;Staff Nurse;Resident Group B Strep:   Lab Results   Component Value Date/Time    GrBStrep, External negative 2015     Information for the patient's :  Raf Cuevaser, Male Ata Bowens [[id number]]   No results found for: ABORH, PCTABR, PCTDIG, BILI, ABORHEXT, ABORH    No results for input(s): PCO2CB, PO2CB, HCO3I, SO2I, IBD, PTEMPI, SPECTI, PHICB, ISITE, IDEV, IALLEN in the last 72 hours.

## 2019-01-17 NOTE — ANESTHESIA PREPROCEDURE EVALUATION
Anesthetic History No history of anesthetic complications Review of Systems / Medical History Pulmonary Within defined limits Pertinent negatives: Smoker: Gestational. 
 
 Neuro/Psych Within defined limits Cardiovascular Hypertension: well controlled GI/Hepatic/Renal 
  
 
 
 
Liver disease Comments: Cholestasis Endo/Other Other Findings Physical Exam 
 
Airway Cardiovascular Rhythm: regular Dental 
 
 
  
Pulmonary Abdominal 
 
 
 
 Other Findings Anesthetic Plan Anesthesia type: epidural

## 2019-01-17 NOTE — ROUTINE PROCESS
Bedside and Verbal shift change report given to ASAF Schuler (oncoming nurse) by Bernice Arroyo RN (offgoing nurse). Report included the following information SBAR, Kardex, Intake/Output, MAR and Recent Results.

## 2019-01-17 NOTE — PROGRESS NOTES
Labor Progress Note Patient seen, fetal heart rate and contraction pattern evaluated, patient examined. Patient reports only mild pruritis this morning. She denies any HA, vision changes, RUQ pain, SOB. Patient Vitals for the past 1 hrs: 
 BP Temp Pulse Resp  
01/17/19 0700 129/73 98.3 °F (36.8 °C) 74 16 Physical Exam: 
Cervical Exam:  Cervical Exam 
Dilation (cm): 1 Eff: 50 % Station: -2 Position: Mid Cervical Consistency: Soft Vaginal exam done by? : [facility] Membrane Status: Intact Membranes:  Intact Uterine Activity: Ctx every 4 minutes Fetal Heart Rate: Baseline 135, moderate variability, +acels, no decels Assessment/Plan: 
 
IUP at 36w1d: O+, neg antibody screen, GBS pending, HBsAg neg, G/C & Chlamydia neg, Rubella immune, HIV neg, TPal neg, Rubella immune, Varicella pending IHCP: Patient with elevated Bile acids 93.5 on 1/4 --> 15.7 on 1/14 with elevated LFTs. Patient treated with ursodiol outpatient with atarax PRN for itching. Patient being induced per [facility] recommendations due to risk of fetal mortality. IOL: Cervidil removed at 4:15 AM, Cervix 1/50/-2 at that time. Patient christopher every 4 minutes on Pitocin at rate of 8 milliunits/min. FHT Cat 1. GBS unknown status: Continue tx with PCN, started at 5AM. Goldie Bhatia MD 
Family Medicine Resident

## 2019-01-18 LAB
B-HEM STREP SPEC QL CULT: NEGATIVE
VZV IGG SER IA-ACNC: 331 INDEX

## 2019-01-18 PROCEDURE — 74011250637 HC RX REV CODE- 250/637: Performed by: STUDENT IN AN ORGANIZED HEALTH CARE EDUCATION/TRAINING PROGRAM

## 2019-01-18 PROCEDURE — 65270000029 HC RM PRIVATE

## 2019-01-18 PROCEDURE — 74011250637 HC RX REV CODE- 250/637: Performed by: FAMILY MEDICINE

## 2019-01-18 PROCEDURE — 77030037759

## 2019-01-18 RX ADMIN — IBUPROFEN 800 MG: 800 TABLET ORAL at 09:27

## 2019-01-18 RX ADMIN — ANTACID TABLETS 400 MG: 500 TABLET, CHEWABLE ORAL at 01:55

## 2019-01-18 RX ADMIN — IBUPROFEN 800 MG: 800 TABLET ORAL at 21:52

## 2019-01-18 RX ADMIN — ANTACID TABLETS 400 MG: 500 TABLET, CHEWABLE ORAL at 09:27

## 2019-01-18 NOTE — PROGRESS NOTES
Post-Partum Day Number 1 Progress Note  # [id number] Patient doing well post-partum without significant complaint. Pain well controlled. Lochia appropriate. Tolerating regular diet. Ambulating. Voiding without difficulty. Passing flatus, but no BM yet. Patient is both breast and bottle feeding. No acute complaint at the moment. Vitals:   
Patient Vitals for the past 8 hrs: 
 BP Temp Pulse Resp  
19 0613 107/61 98.1 °F (36.7 °C) 63 14  
19 2332 105/57 98.1 °F (36.7 °C) 60 16 Temp (24hrs), Av.4 °F (36.9 °C), Min:98.1 °F (36.7 °C), Max:99 °F (37.2 °C) Exam:  Patient without distress. CTAB, no w/r/r/c. 
             RRR, +S1 and S2, no m/r/g. Abdomen soft, fundus firm at level of umbilicus, nontender. Perineum with normal lochia noted. Lower extremities:  No edema. No palpable cords or tenderness. Lab/Data Review: No results found for this or any previous visit (from the past 12 hour(s)). Assessment and Plan:   
Jessica Dangelo is a 30 y.o.  s/p  at 43w3d of male infant. Pregnancy was complicated by ICP requiring induction, but patient appears to be having uncomplicated post-partum course. 1. ICP: Bile acids 15.7 on 19, s/p ursodiol and atrax prn during pregnancy. U/S only showed GB sludge but otherwise unremarkable. Patient currently denies pruritis. 2. GBS bacteruria unknown: Adequately treated with PCN ppx. 
3. Patient is both breast and bottle feeding. Declines circumcision. Baby boy will f/u with [place]. Odalis Canales [place] [facility]. 
4. Continue routine perineal care and maternal education. 5. Plan discharge tomorrow if no problems occur. Patient will be seen and discussed with Dr. Julio Cesar Barber. Imani Lombardo MD 
Family Medicine Resident

## 2019-01-18 NOTE — LACTATION NOTE
Saúl Gomez Lactation Consultant Progress Notes Signed Date of Service:  01/18/19 7511 Problem: Lactation Care Plan Goal: *Infant latching appropriately Outcome: Progressing Towards Goal 
Pt will successfully establish breastfeeding by feeding in response to infant's early feeding cues and/or to offer breast every 2-3 hours. Ways to obtain a deep latch and seek comfortable positioning shared, aware to keep log of feedings/output. Goal: *Weight loss less than 10% of birth weight Outcome: Progressing Towards Goal 
Pt chooses to do both breast and bottle. Discussed effects of early supplementation on breastfeeding success; may decrease breastmilk production and supply, increase risk for pathological engorgement, baby may develop preference for faster flow from bottles vs breast, and baby's stomach can be stretched if larger volumes of formula are given. 
  
Problem: Patient Education: Go to Patient Education Activity Goal: Patient/Family Education Outcome: Progressing Towards Goal 
Mother has been offering baby formula first and baby does not want to breastfeed. Instructed mother to offer baby breast first so that baby gets her antibodies. Mother has flat nipples - latch assist used to help keo nipples- Instructed mom on how to use latch assist to help draw out nipples. Discussed how to hand stimulate nipples to help draw them out. Discussed using nipple shells to slowly draw out nipples.   
  
Symphony pump set up per mother's request. Instructed mother to pump if baby does not breast feed to help stimulate her breast milk supply. Pumping:  Guidelines for pumping, milk collection and storage, proper cleaning of pump parts all reviewed. How to establish and maintain breast milk supply through pumping reviewed. Set up pumping with double electric set up. Assisted with pump session.    List of area pump rental locations and lactation support services provided. 
  
  
 Comments: Pt will successfully establish breastfeeding by feeding in response to early feeding cues  
or wake every 3h, will obtain deep latch, and will keep log of feedings/output. Taught to BF at hunger cues and or q 2-3 hrs and to offer 10-20 drops of hand expressed colostrum at any non-feeds.   
  
Breast Assessment Left Breast: Medium, Large Left Nipple: Flat, Intact, Short Right Breast: Medium, Large Right Nipple: Flat, Intact, Short Breast- Feeding Assessment Attends Breast-Feeding Classes: No 
Breast-Feeding Experience: Yes(Blended fieedings 1st 2 children.  x 1 month each child. ) Breast Trauma/Surgery: No 
Type/Quality: Attempted Lactation Consultant Visits Breast-Feedings: Attempted breast-feeding(Mother had just given baby 22ml of formula and wanted to try and nurse baby. Mom tried x 15 min. but baby not interested. Symphony pump set up - instructions for use given. Instructed mom to offer breastmilk 1st) Mother/Infant Observation Mother Observation: Alignment, Breast comfortable, Close hold, Holds breast(attempted to breastfeed) Infant Observation: Opens mouth LATCH Documentation Latch: (Breastfeeding attempted)

## 2019-01-18 NOTE — ROUTINE PROCESS
Bedside and Verbal shift change report given to [address] [place] [address] (oncoming nurse) by Jess Carbajal RN (offgoing nurse). Report included the following information SBAR, Intake/Output, MAR and Recent Results.

## 2019-01-19 VITALS
WEIGHT: 188 LBS | RESPIRATION RATE: 16 BRPM | HEART RATE: 61 BPM | SYSTOLIC BLOOD PRESSURE: 109 MMHG | HEIGHT: 62 IN | DIASTOLIC BLOOD PRESSURE: 68 MMHG | BODY MASS INDEX: 34.6 KG/M2 | OXYGEN SATURATION: 100 % | TEMPERATURE: 97.8 F

## 2019-01-19 PROCEDURE — 74011250637 HC RX REV CODE- 250/637: Performed by: STUDENT IN AN ORGANIZED HEALTH CARE EDUCATION/TRAINING PROGRAM

## 2019-01-19 RX ORDER — IBUPROFEN 800 MG/1
800 TABLET ORAL EVERY 8 HOURS
Qty: 20 TAB | Refills: 0 | Status: SHIPPED | OUTPATIENT
Start: 2019-01-19 | End: 2019-01-19 | Stop reason: SDUPTHER

## 2019-01-19 RX ORDER — IBUPROFEN 800 MG/1
800 TABLET ORAL EVERY 8 HOURS
Qty: 20 TAB | Refills: 0 | Status: SHIPPED | OUTPATIENT
Start: 2019-01-19 | End: 2019-09-28

## 2019-01-19 RX ADMIN — IBUPROFEN 800 MG: 800 TABLET ORAL at 06:03

## 2019-01-19 NOTE — LACTATION NOTE
This note was copied from a baby's chart. Went in for visit with mother, mother states that she is formula feeding, has done a few breastfeedings but wants to formula feed and will breastfeed once she gets home she will breastfeed, this is what she did with her last 2 children. Pt chooses to do both breast and bottle. Discussed effects of early supplementation on breastfeeding success; may decrease breastmilk production and supply, increase risk for pathological engorgement, baby may develop preference for faster flow from bottles vs breast, and baby's stomach can be stretched if larger volumes of formula are given. Chart shows numerous feedings mostly formula, void, stool WNL. Discussed importance of monitoring outputs and feedings on first week of life. Discussed ways to tell if baby is  getting enough breast milk, ie  voids and stools, change in color of stool, and return to birth wt within 2 weeks. Follow up with pediatrician visit for weight check in 1-2 days (per AAP guidelines.)  Encouraged to call [facility]  [phone] or [facility] at [phone] for any questions/problems that arise. Mother also given breastfeeding support group dates and times for any future needs

## 2019-01-19 NOTE — ROUTINE PROCESS
Bedside shift change report given to Himanshu Jc RN (oncoming nurse) by IVELISSE Tai RN (offgoing nurse). Report included the following information SBAR and MAR.

## 2019-01-19 NOTE — DISCHARGE INSTRUCTIONS
Follow-up Information     Follow up With Specialties Details Why [address], [facility] On 2/28/2019 postpartum visit at 10:00am 1400 [facility] [address] (período de posparto): Después de la consulta  [After Your Delivery (the Postpartum Period): After Your Visit]  Instrucciones de cuidado  Felicidades por el nacimiento de garcía bebé. Al igual que el Oralia, el tiempo con el recién nacido puede ser un momento de [place], Niels Fritter y agotamiento. Es posible que se sienta trammell al mirar la brady de garcía pequeño bebé. También podría sentirse abrumada por garcía nuevo ritmo de sueño y las nuevas responsabilidades. Al principio, los bebés suelen dormir yulissa el día y permanecen despiertos yulissa la noche. No tienen ningún patrón ni rutina. Podrían polly gritos ahogados, sacudirse y despertarse, o parecer trey si tuvieran los ojos cruzados (bizcos). Todo esto es normal, e incluso la pueden hacer sonreír. Yulissa las primeras semanas siguientes al parto, trate de cuidarse samuel. Podría tardar de 4 a 6 semanas en volver a sentirse usted misma, y posiblemente más tiempo si le tobias hecho lazarus cesárea. Es probable que se sienta muy fatigada yulissa varias semanas. Noris días estarán llenos de [place], cristnia también de mucha alegría. La atención de seguimiento es lazarus parte clave de garcía tratamiento y seguridad. Asegúrese de hacer y acudir a todas las citas, y llame a garcía médico si está teniendo problemas. También es lazarus buena idea saber los resultados de los exámenes y mantener lazarus lista de los medicamentos que terrie. ¿Cómo puede cuidarse en el hogar? Cuide garcía cuerpo después del parto  · Utilice toallas sanitarias en vez de tampones para las pérdidas de chet que podrían durar hasta 2 semanas. · Alivie los cólicos con ibuprofeno (Advil, Motrin).   · Alivie el dolor de las hemorroides y la nguyen entre la vagina y el recto con compresas de hielo o de infusión de hamamelis Janraymundo Blank (\"witch hazel\"). · Alivie el estreñimiento bebiendo abundante líquido y comiendo alimentos ricos en fibra. Pregúntele a garíca médico acerca de los ablandadores de heces de [employer]. · Límpiese con un chorrito suave de agua tibia de lazarus botella en vez de hacerlo con papel higiénico.  · Sawyer un baño de asiento en agua tibia varias veces al día. · Use un buen sostén de lactancia. Alivie el dolor y la hinchazón de los senos con toallitas de aseo húmedas tibias. · Si no está amamantando, utilice hielo en lugar de calor para aliviar el dolor de los senos. · Si está amamantando, garcía período menstrual podría no comenzar hasta después de varios meses. Es posible que Chem[place], y más tiempo al principio, de trey lo hacía antes del [place]. · Espere hasta que haya sanado (de 4 a 6 semanas) antes de volver a tener relaciones sexuales. García médico le dirá cuándo puede tener relaciones sexuales. · Trate de no viajar con el bebé lee las primeras 5 o 6 semanas. Si hace un viaje martir en automóvil, juana paradas frecuentes para caminar y estirarse. Evite el agotamiento  · Descanse todos los días. Trate de dormir la siesta cuando garcía bebé también lo hace. · Pídale a otro adulto que la acompañe por unos cecile después del [place]. · Planifique el cuidado de los niños si tiene otros hijos. · Sea flexible para que pueda comer a horas fuera de lo común y dormir cuando lo necesite. Tanto usted trey garcía bebé están creando horarios nuevos. · Planee pequeñas salidas para estar afuera de la casa. El cambio podría hacer que se sienta menos fatigada. · Pida ayuda para cocinar y [address] hogar y las compras. Recuerde que garcía principal tarea consiste en cuidar a garcía bebé. Conozca la ayuda que puede recibir en maicol de tener depresión posparto  · La depresión posparto es común lee las primeras 1 a 2 semanas siguientes al nacimiento.  Podría llorar o sentirse kellee o irritable sin razón alguna. · Descanse cada vez que pueda hacerlo. Estar fatigada le dificulta manejar trent emociones. · Salga a caminar con garcía bebé. · Hable con garcía liudmila, trent amigos y trent familiares acerca de trent sentimientos. · Si trent síntomas van más de unas pocas semanas, o si se siente muy deprimida, pídale ayuda a garcía médico.  · La depresión posparto puede tratarse. Los grupos de apoyo y la asesoría psicológica pueden ser de [facility]. A veces, los medicamentos también pueden ayudar. Manténgase saludable  · Coma alimentos sanos para tener más energía, producir lazarus buena [place] materna y adelgazar las libras adicionales que engordó con el bebé. · Si amamanta, evite el alcohol y las drogas. No fume. Si dejó de fumar lee el embarazo, felicitaciones. · Inicie ejercicios diarios después de 4 a 6 semanas, cristina descanse cuando se sienta fatigada. · Aprenda ejercicios para tonificar el abdomen. Julián los ejercicios de [place] para recuperar la fuerza de los músculos pélvicos. Puede hacer los ejercicios de [place] mientras está de pie o sentada. ¨ Apriete los mismos músculos que usted usaría para detener la [place]. El vientre y las nalgas (glúteos) no deben moverse. ¨ Manténgalos apretados lee 3 segundos, luego relájelos otros 3 segundos. ¨ Repita el ejercicio entre 10 y 13 veces cada sesión. Julián kelly o más sesiones cada día. · Busque lazarus clase para nuevas madres y recién nacidos que tenga un tiempo de ejercicio. · Si le tobias hecho lazarus cesárea, dese un poco más de tiempo antes de hacer ejercicio, y tenga cuidado. ¿Cuándo debe pedir ayuda? Llame al 911 en cualquier momento que considere que necesita atención de emergencia. Por ejemplo, llame si:  · Se desmayó (perdió el conocimiento). Llame a garcía médico ahora mismo o busque atención médica inmediata si:  · Tiene sangrado vaginal intenso. [place] significa que está expulsando coágulos sanguíneos y empapando lazarus toalla sanitaria cada hora por 2 horas o más.   · Está mareada o aturdida, o siente trey que se puede desmayar. · Tiene fiebre. · Tiene dolor abdominal nuevo o que empeora. Preste especial atención a los cambios en garcía jean-pierre y asegúrese de comunicarse con garcía médico si:  · García sangrado vaginal parece volverse más intenso. · Tiene flujo vaginal nuevo o que empeora. · Se siente kellee, ansiosa o sin esperanzas lee más de unos pocos días. · No mejora trey se esperaba. ¿Dónde puede encontrar más información en inglés? Liliam Lyn a [email or web address]  Bianca P643 en la búsqueda para aprender más acerca de \"Después del parto (período de posparto): Después de la consulta. \"   © 4460-0243 Healthwise, Incorporated. Instrucciones de cuidado adaptadas bajo licencia por [employer] (which disclaims liability or warranty for this information). Estas instrucciones de cuidado son para usarlas con garcía profesional clínico registrado. Si tiene preguntas acerca de lazarus afección médica o de estas instrucciones, pregunte siempre a garcía profesional de [employer]. Healthwise, Incorporated niega cualquier garantía o responsabilidad por garcía uso de esta información.   Versión del contenido: [phone]; Última revisión: 20 marzo, 2012

## 2019-01-19 NOTE — DISCHARGE SUMMARY
Obstetrical Discharge Summary     Name: Isaura Littlejohn MRN: [id number]  SSN: [id number]    YOB: 1990  Age: 30 y.o. Sex: female      Admit Date: 2019    Discharge Date: 2019     Admitting Physician: Cristel Vera DO     Attending Physician:  Alistair Hastings DO     Admission Diagnoses: Pregnancy  Normal labor    Discharge Diagnoses:   Information for the patient's :  Randi Espino, Male Myla [[id number]]   Delivery of a 6 lb 5.6 oz (2.88 kg) male infant via Vaginal, Spontaneous on 2019 at 2:04 PM  by . Apgars were 9 and 9. Additional Diagnoses:   Hospital Problems  Date Reviewed: 2019          Codes Class Noted POA    * (Principal) Normal labor ICD-10-CM: O80, Z37.9  ICD-9-CM: 051  2019 Unknown        Cholestasis of pregnancy in third trimester ICD-10-CM: O26.613, K83.1  ICD-9-CM: 646.73, 576.8  2015 Yes        Intrahepatic cholestasis of pregnancy ICD-10-CM: O26.619, K83.1  ICD-9-CM: 646.70, 576.8  2015 Yes        Obesity (BMI 30.0-34.9) ICD-10-CM: E66.9  ICD-9-CM: 278.00  2015 Yes             Lab Results   Component Value Date/Time    Rubella, External immune 2015    GrBStrep, External negative 2015       Immunization(s):   Immunization History   Administered Date(s) Administered    Influenza Vaccine (Quad) PF 10/02/2015, 10/05/2018    Influenza Vaccine Split 10/17/2012    Tdap 2015, 2018        PNL: O+, scrn neg, Rubella/VZV immune, G/G neg, HIV/HepBsAg/RPR neg. 1hr GTT failed, but 3h GTT wnl(3/4 nl), A1c 5.3. AFP tetra wnl. GBS neg. Hg 11.7 on     Hospital Course: Patient is 29yro [id number] who was admitted to L&D for IOL secondary to severe pruritus due to ICP, despite being on ursodiol. Patient was induced with Cervidil and Pitocin, received PCN x 3 due to GBS unknown status at the time(subsequently negative result). She delivered live male infant at 36w1d via  on  without complications. Postpartum care has been uncomplicated, denied pruritus during inpatient stay. She is both breast and bottle feeding, declined circumcision(baby will follow-up with Dr. Nava Samuel at [facility]). She was discharged on Motrin prn and instructed to follow-up with PCP in 6 weeks. Exam:  Patient without distress. CTAB, no w/r/r/c.               RRR, +S1 and S2, no m/r/g. Abdomen soft, fundus firm below level of umbilicus, nontender. Perineum with normal lochia noted. Lower extremities:  No edema. No palpable cords or tenderness. Visit Vitals  /73 (BP 1 Location: Right arm, BP Patient Position: At rest)   Pulse (!) 57   Temp 97.8 °F (36.6 °C)   Resp 16   Ht 5' 2\" (1.575 m)   Wt 188 lb (85.3 kg)   LMP 04/13/2018   SpO2 100%   Breastfeeding? Unknown   BMI 34.39 kg/m²       Condition at Discharge:  Stable    Disposition:  Discharge home, will follow-up with Dr. Niurka Stewart on 02/28/19 at 10:00am    Patient Instructions:   Current Discharge Medication List      START taking these medications    Details   ibuprofen (MOTRIN) 800 mg tablet Take 1 Tab by mouth every eight (8) hours. Qty: 20 Tab, Refills: 0         CONTINUE these medications which have NOT CHANGED    Details   raNITIdine (ZANTAC) 150 mg tablet Take 2 Tabs by mouth two (2) times a day. Qty: 60 Tab, Refills: 3      polyethylene glycol (MIRALAX) 17 gram packet Take 1 Packet by mouth daily as needed.   Qty: 30 Each, Refills: 0    Associated Diagnoses: Constipation, unspecified constipation type         STOP taking these medications       ursodiol (ACTIGALL) 250 mg tablet Comments:   Reason for Stopping:         hydrOXYzine HCl (ATARAX) 10 mg tablet Comments:   Reason for Stopping:         pyridoxine, vitamin B6, (VITAMIN B-6) 25 mg tablet Comments:   Reason for Stopping:         prenatal vit no.112-folate no6 1 mg chew Comments:   Reason for Stopping:         docusate sodium (COLACE) 100 mg capsule Comments:   Reason for Stopping:         psyllium seed-sucrose (METAMUCIL, SUGAR,) powd Comments:   Reason for Stopping:               Reference my discharge instructions. No orders of the defined types were placed in this encounter.        Signed By:  Kaykay Pickering MD    Family Medicine Resident

## 2019-01-19 NOTE — PROGRESS NOTES
Discharge instructions given to mob using the blue  phone #[phone]. Parents verbalized understanding and no further questions. Pt discharge home with family

## 2019-02-28 ENCOUNTER — ROUTINE PRENATAL (OUTPATIENT)
Dept: FAMILY MEDICINE CLINIC | Age: 29
End: 2019-02-28

## 2019-02-28 VITALS
HEIGHT: 62 IN | OXYGEN SATURATION: 100 % | WEIGHT: 171 LBS | DIASTOLIC BLOOD PRESSURE: 69 MMHG | TEMPERATURE: 98.1 F | HEART RATE: 60 BPM | BODY MASS INDEX: 31.47 KG/M2 | SYSTOLIC BLOOD PRESSURE: 100 MMHG | RESPIRATION RATE: 18 BRPM

## 2019-02-28 DIAGNOSIS — G57.02 PIRIFORMIS SYNDROME OF LEFT SIDE: ICD-10-CM

## 2019-02-28 DIAGNOSIS — Z01.818 TUBAL LIGATION EVALUATION: ICD-10-CM

## 2019-02-28 DIAGNOSIS — O26.619 INTRAHEPATIC CHOLESTASIS OF PREGNANCY: ICD-10-CM

## 2019-02-28 DIAGNOSIS — R74.01 ELEVATED TRANSAMINASE LEVEL: ICD-10-CM

## 2019-02-28 DIAGNOSIS — K83.1 INTRAHEPATIC CHOLESTASIS OF PREGNANCY: ICD-10-CM

## 2019-02-28 NOTE — PROGRESS NOTES
Return OB Visit       Subjective:   Steve Castro 30 y.o.  who is 6 weeks post partum. Discharge Summary:  \"Patient is 29yro [id number] who was admitted to L&D for IOL secondary to severe pruritus due to ICP, despite being on ursodiol. Patient was induced with Cervidil and Pitocin, received PCN x 3 due to GBS unknown status at the time(subsequently negative result). She delivered live male infant at 36w1d via  on  without complications. Postpartum care has been uncomplicated, denied pruritus during inpatient stay. She is both breast and bottle feeding, declined circumcision (baby will follow-up with Dr. Serge Altman at [facility]). She was discharged on Motrin prn and instructed to follow-up with PCP in 6 weeks. \"    Complaints today: pain in lower back and L leg/ hip, states it hurts more with walking, denies numbness in legs  No lochia, not breastfeeding  Interested in tubal ligation  Denies depression/ sadness    [facility] [id number]    Past Medical History - Reviewed today  Patient Active Problem List   Diagnosis Code    Obesity (BMI 30.0-34. 9) E66.9    Poor weight gain of pregnancy O26.10    Cholestasis of pregnancy in third trimester O26.613, K83.1    Elevated transaminase level R74.0    Intrahepatic cholestasis of pregnancy O26.619, K83.1    Maternal obesity affecting pregnancy, antepartum O99.210    Acid reflux K21.9    Constipation in pregnancy O99.619, K59.00    Normal labor O80, Z37.9     Medications - Reviewed today  Current Outpatient Medications   Medication Sig Dispense Refill    ibuprofen (MOTRIN) 800 mg tablet Take 1 Tab by mouth every eight (8) hours. 20 Tab 0    raNITIdine (ZANTAC) 150 mg tablet Take 2 Tabs by mouth two (2) times a day. 60 Tab 3    polyethylene glycol (MIRALAX) 17 gram packet Take 1 Packet by mouth daily as needed.  30 Each 0     Allergies - Reviewed today  No Known Allergies    Family History - Reviewed today  Family History   Problem Relation Age of Onset    Diabetes Mother      Social History - Reviewed today  Social History     Socioeconomic History    Marital status: SINGLE     Spouse name: Not on file    Number of children: Not on file    Years of education: Not on file    Highest education level: Not on file   Social Needs    Financial resource strain: Not on file    Food insecurity - worry: Not on file    Food insecurity - inability: Not on file   [employer] needs - medical: Not on file   [employer] needs - non-medical: Not on file   Occupational History    Not on file   Tobacco Use    Smoking status: Never Smoker    Smokeless tobacco: Never Used   Substance and Sexual Activity    Alcohol use: No    Drug use: No    Sexual activity: Yes     Partners: Male     Birth control/protection: None, Condom   Other Topics Concern     Service Not Asked    Blood Transfusions Not Asked    Caffeine Concern Not Asked    Occupational Exposure Not Asked    Hobby Hazards Not Asked    Sleep Concern Not Asked    Stress Concern Not Asked    Weight Concern Not Asked    Special Diet Not Asked    Back Care Not Asked    Exercise Not Asked    Bike Helmet Not Asked    Seat Belt Not Asked    Self-Exams Not Asked   Social History Narrative    ** Merged History Encounter **         ** Merged History Encounter **        Health Maintenance - Reviewed today   Screening:     -Pap smear: 06/18, neg for IEL    Objective:     Visit Vitals  /69   Pulse 60   Temp 98.1 °F (36.7 °C) (Oral)   Resp 18   Ht 5' 2\" (1.575 m)   Wt 171 lb (77.6 kg)   LMP 04/13/2018   SpO2 100%   Breastfeeding?  No   BMI 31.28 kg/m²       Physical Exam:  GENERAL APPEARANCE: alert, well appearing, in no apparent distress  ABDOMEN: soft, nontender, nondistended, no abnormal masses, no epigastric pain and obese  BACK: Midline back pain on palpation at L3-L4 below site of epidural, L buttock pain on palpation, equal sensation BL, strenght 5/5 in lower extremity, + piriformis special testing  EXTREMITIES: no redness or tenderness in the calves or thighs, no edema  NEUROLOGICAL: alert, oriented, normal speech, no focal findings or movement disorder noted    EPDS 1    Assessment   SIUP at  36w1d       ICD-10-CM ICD-9-CM    1.  (spontaneous vaginal delivery) O80 650    2. Elevated transaminase level H43.1 964.5 METABOLIC PANEL, COMPREHENSIVE   3. Intrahepatic cholestasis of pregnancy O26.619 646.70     K83.1 576.8    4. Tubal ligation evaluation Z01.818 V72.83 REFERRAL TO OBSTETRICS AND GYNECOLOGY     Plan   Back pain, buttock pain, suspect piriformis sydnrome  - + pirofrmis special testing, given piriformis exercises  - If pain does not improve in 2-3 weeks RTC    Post partum check  - EPDS 1, denies feeling down/ depression  - Contraception, referral to OB/ GYN, [facility] for further discussion of tubal ligation. Patient states her  will not pay for procedure and has no insurance  - Recheck CMP to ensure liver enzymes have normalized  - RTC in  for well woman exam, UTD on PAP    I have discussed the diagnosis with the patient and the intended plan as seen in the above orders. The patient has received an after-visit summary and questions were answered concerning future plans. I have discussed medication side effects and warnings with the patient as well.     Patient seen and discussed with Dr. Saumya Riley (Attending)    Nithin Saravia DO  Family Medicine Resident

## 2019-02-28 NOTE — PATIENT INSTRUCTIONS
Síndrome piriforme: Ejercicios - [ Piriformis Syndrome: Exercises ]  Instrucciones de cuidado  Estos son algunos ejemplos de ejercicios típicos de rehabilitación para garcía afección. Comience cada ejercicio lentamente. Reduzca la intensidad del ejercicio si Francisco Javier Lemme a sentir dolor. García médico o el fisioterapeuta le dirán cuándo puede comenzar con estos ejercicios y cuáles funcionarán mejor para usted. Cómo se hacen los ejercicios  Estiramiento del rotador de la cadera    1. Acuéstese boca arriba con ambas rodillas flexionadas y los pies apoyados sobre el piso. 2. Ponga el tobillo de la pierna afectada sobre el muslo opuesto, cerca de la rodilla. 3. Use la mano para alejar suavemente la rodilla (en la pierna afectada) del cuerpo hasta sentir un estiramiento suave alrededor de la cadera. 4. Mantenga el estiramiento de 15 a 30 segundos. 5. Repita de 2 a 4 veces. 6. Cambie de pierna y repita los pasos del 1 al 5.     Estiramiento piriforme    1. Acuéstese boca arriba con las piernas estiradas. 2. Levante la pierna afectada y doble la rodilla. Pase la mano contraria por encima de garcía cuerpo y jale suavemente la rodilla hacia el hombro opuesto. 3. Mantenga el estiramiento de 15 a 30 segundos. 4. Repita con la otra pierna. 5. Repita de 2 a 4 veces sobre cada lado.     Fortalecimiento de la parte inferior del abdomen    1. Acuéstese boca arriba con las rodillas flexionadas y los pies apoyados sobre el piso. 2. Tense los músculos del abdomen metiendo el ombligo hacia la columna vertebral.  3. Levante un pie del suelo y lleve la rodilla hacia el pecho, de manera que la rodilla esté derecha por encima de la cadera y la pierna esté doblada en forma de \"L\". 4. Levante la otra rodilla hasta la misma posición. 5. Baje lazarus pierna a la vez hasta la posición inicial.  6. Siga alternando las piernas hasta que haya levantado cada pierna de 8 a 12 veces.   7. Asegúrese de [place] músculos del abdomen y la espalda inmóvil mientras mueve las piernas. Asegúrese de respirar normalmente.     La atención de seguimiento es lazarus parte clave de garcía tratamiento y seguridad. Asegúrese de hacer y acudir a todas las citas, y llame a garcía médico si está teniendo problemas. También es lazarus buena idea saber los resultados de trent exámenes y mantener lazarus lista de los medicamentos que terrie. Revisado: 20 septiembre, 2018  Versión del contenido: 11.9  © 7828-2168 Healthwise, Incorporated. Las instrucciones de cuidado fueron adaptadas bajo licencia por [facility] (which disclaims liability or warranty for this information). Si usted tiene [place] afección médica o sobre estas instrucciones, siempre pregunte a garcía profesional de jean-pierre. Healthwise, Incorporated niega toda garantía o responsabilidad por garcía uso de esta información.     [facility]  Address: [address]   Hours:   Open ?  Closes 5PM   Phone: [phone] OB/GYN  [place], [place]   [phone]

## 2019-02-28 NOTE — PROGRESS NOTES
Complaining of left hip/buttock pain    Suspect piriformis strain    I saw and evaluated the patient, performing the key elements of the service. I discussed the findings, assessment and plan with the resident and agree with the resident's findings and plan as documented in the resident's note.

## 2019-03-01 LAB
ALBUMIN SERPL-MCNC: 4.4 G/DL (ref 3.5–5.5)
ALBUMIN/GLOB SERPL: 1.6 {RATIO} (ref 1.2–2.2)
ALP SERPL-CCNC: 103 IU/L (ref 39–117)
ALT SERPL-CCNC: 19 IU/L (ref 0–32)
AST SERPL-CCNC: 16 IU/L (ref 0–40)
BILIRUB SERPL-MCNC: 0.4 MG/DL (ref 0–1.2)
BUN SERPL-MCNC: 15 MG/DL (ref 6–20)
BUN/CREAT SERPL: 22 (ref 9–23)
CALCIUM SERPL-MCNC: 9.6 MG/DL (ref 8.7–10.2)
CHLORIDE SERPL-SCNC: 103 MMOL/L (ref 96–106)
CO2 SERPL-SCNC: 21 MMOL/L (ref 20–29)
CREAT SERPL-MCNC: 0.68 MG/DL (ref 0.57–1)
GLOBULIN SER CALC-MCNC: 2.8 G/DL (ref 1.5–4.5)
GLUCOSE SERPL-MCNC: 85 MG/DL (ref 65–99)
POTASSIUM SERPL-SCNC: 4 MMOL/L (ref 3.5–5.2)
PROT SERPL-MCNC: 7.2 G/DL (ref 6–8.5)
SODIUM SERPL-SCNC: 138 MMOL/L (ref 134–144)

## 2019-03-12 ENCOUNTER — TELEPHONE (OUTPATIENT)
Dept: FAMILY MEDICINE CLINIC | Age: 29
End: 2019-03-12

## 2019-03-12 NOTE — DISCHARGE INSTRUCTIONS
Esperamos que hayamos tratado todas trent preocupaciones médicas. El examen y el tratamiento que recibió en el Departamento de Emergencias fueron por un problema emergente y no se pensó trey atención Rodas. Es importante que realice un seguimiento con garcía (s) proveedor (es) de atención médica para el cuidado continuo. Si trent síntomas empeoran o no mejoran trey se esperaba y no puede comunicarse con garcía proveedor de atención médica habitual, debe regresar al [facility] de Emergencias.     La asistencia sanitaria de nilda está experimentando un tremendo cambio, y las encuestas de satisfacción de los pacientes son [facility] de las muchas herramientas para evaluar la calidad de la atención Webb. Puede recibir lazarus encuesta de la organización [facility] con respecto a garcía experiencia en el Departamento de Emergencias. Espero que garcía experiencia haya sido completamente positiva, particularmente la [employer] proporcioné. [place] mary, por favor participe en la encuesta; Azul Sukhicarlita que excelente no cumple mis [address] [facility] y [address] en medidas de calidad de atención reconocidas a nivel nacional. Si garcía presión arterial es mayor de 120/80, trey se indica a continuación, le pedimos que busque atención médica para tratar el potencial de la presión arterial radha, comúnmente conocida trey hipertensión. La hipertensión puede ser hereditaria o puede ser causada por ciertas condiciones médicas, dolor, estrés o \"síndrome de la capa brianna\".     Por favor, juana lazarus ashley con garcía (s) proveedor (es) de atención médica para el seguimiento de garcía visita al Departamento de Emergencias.     PARTES VITALES:  Vitales del Paciente lee las últimas 8 horas:  Temp Pulso Resp BP SpO2  31/01/17 2317 98,7 ° F (37,1 ° C) 75 16 110/71 100%  31/01/17 2113 98,5 ° F (36,9 ° C) 66 20 139/86 99%         Lino por permitirnos brindarle atención médica hoy.  Nos damos cuenta de que usted tiene muchas opciones para trent necesidades de atención de emergencia. Por favor, escríbanos en el futuro para cualquier necesidad continua de [facility]. [facility], [address] [place] [address].   Office: [phone]            Recent Results (from the past 24 hour(s))   URINALYSIS W/ REFLEX CULTURE    Collection Time: 01/31/17  9:38 PM   Result Value Ref Range    Color YELLOW/STRAW      Appearance CLEAR CLEAR      Specific gravity 1.028 1.003 - 1.030      pH (UA) 6.0 5.0 - 8.0      Protein NEGATIVE  NEG mg/dL    Glucose NEGATIVE  NEG mg/dL    Ketone NEGATIVE  NEG mg/dL    Bilirubin NEGATIVE  NEG      Blood NEGATIVE  NEG      Urobilinogen 0.2 0.2 - 1.0 EU/dL    Nitrites NEGATIVE  NEG      Leukocyte Esterase NEGATIVE  NEG      WBC 0-4 0 - 4 /hpf    RBC 0-5 0 - 5 /hpf    Epithelial cells FEW FEW /lpf    Bacteria NEGATIVE  NEG /hpf    UA:UC IF INDICATED CULTURE NOT INDICATED BY UA RESULT CNI      CA Oxalate Crystals FEW (A) NEG     METABOLIC PANEL, COMPREHENSIVE    Collection Time: 01/31/17  9:38 PM   Result Value Ref Range    Sodium 138 136 - 145 mmol/L    Potassium 3.6 3.5 - 5.1 mmol/L    Chloride 104 97 - 108 mmol/L    CO2 26 21 - 32 mmol/L    Anion gap 8 5 - 15 mmol/L    Glucose 95 65 - 100 mg/dL    BUN 12 6 - 20 MG/DL    Creatinine 0.90 0.55 - 1.02 MG/DL    BUN/Creatinine ratio 13 12 - 20      GFR est AA >60 >60 ml/min/1.73m2    GFR est non-AA >60 >60 ml/min/1.73m2    Calcium 9.2 8.5 - 10.1 MG/DL    Bilirubin, total 0.3 0.2 - 1.0 MG/DL    ALT 30 12 - 78 U/L    AST 16 15 - 37 U/L    Alk.  phosphatase 104 45 - 117 U/L    Protein, total 8.2 6.4 - 8.2 g/dL    Albumin 4.1 3.5 - 5.0 g/dL    Globulin 4.1 (H) 2.0 - 4.0 g/dL    A-G Ratio 1.0 (L) 1.1 - 2.2     CBC WITH AUTOMATED DIFF    Collection Time: 01/31/17  9:38 PM   Result Value Ref Range    WBC 9.1 3.6 - 11.0 K/uL    RBC 5.09 3.80 - 5.20 M/uL    HGB 15.2 11.5 - 16.0 g/dL    HCT 44.6 35.0 - 47.0 % MCV 87.6 80.0 - 99.0 FL    MCH 29.9 26.0 - 34.0 PG    MCHC 34.1 30.0 - 36.5 g/dL    RDW 13.4 11.5 - 14.5 %    PLATELET 711 787 - 801 K/uL    NEUTROPHILS 61 32 - 75 %    LYMPHOCYTES 31 12 - 49 %    MONOCYTES 7 5 - 13 %    EOSINOPHILS 1 0 - 7 %    BASOPHILS 0 0 - 1 %    ABS. NEUTROPHILS 5.6 1.8 - 8.0 K/UL    ABS. LYMPHOCYTES 2.8 0.8 - 3.5 K/UL    ABS. MONOCYTES 0.6 0.0 - 1.0 K/UL    ABS. EOSINOPHILS 0.1 0.0 - 0.4 K/UL    ABS. BASOPHILS 0.0 0.0 - 0.1 K/UL   LIPASE    Collection Time: 01/31/17  9:38 PM   Result Value Ref Range    Lipase 198 73 - 393 U/L   HCG URINE, QL. - POC    Collection Time: 01/31/17  9:41 PM   Result Value Ref Range    Pregnancy test,urine (POC) NEGATIVE  NEG     POC GROUP A STREP    Collection Time: 01/31/17 10:11 PM   Result Value Ref Range    Group A strep (POC) NEGATIVE  NEG         Ct Abd Pelv W Cont    Result Date: 1/31/2017  INDICATION: Abdominal pain COMPARISON: 2016 TECHNIQUE: Following the uneventful intravenous administration of 96 cc Isovue-370, thin axial images were obtained through the abdomen and pelvis. Coronal and sagittal reconstructions were generated. Oral contrast was not administered. CT dose reduction was achieved through use of a standardized protocol tailored for this examination and automatic exposure control for dose modulation. FINDINGS: The lung bases are unremarkable. Liver and spleen are normal in size. The gallbladder is not distended, the a pancreas and adrenals appear unremarkable. Kidneys are unobstructed. There is no free air or free fluid. Appendix appears unremarkable. There is no bowel wall thickening or obstruction. Uterus and ovaries appear normal by this technique. The bladder is midline. impression: Negative. Us Transvaginal    Result Date: 2/1/2017  Clinical indication: Pelvic pain. Transabdominal and transvaginal pelvic sonography are performed. The uterus measures 7.2 x 3.5 x 5 cm. There is a small posterior fundal fibroid measuring 1.5 cm. The uterus is retroflexed. The stripe is 1.2 cm. The right ovary measured 2.8 x 2.8 x 1.9 cm, normal flow, the left ovary measured 3.9 x 2.0 x 2.9 cm, normal flow. There is no free fluid. The adnexa appear unremarkable. impression: Small uterine fibroid. No other significant findings. Us Pelv Non Obs    Result Date: 2/1/2017  Clinical indication: Pelvic pain. Transabdominal and transvaginal pelvic sonography are performed. The uterus measures 7.2 x 3.5 x 5 cm. There is a small posterior fundal fibroid measuring 1.5 cm. The uterus is retroflexed. The stripe is 1.2 cm. The right ovary measured 2.8 x 2.8 x 1.9 cm, normal flow, the left ovary measured 3.9 x 2.0 x 2.9 cm, normal flow. There is no free fluid. The adnexa appear unremarkable. impression: Small uterine fibroid. No other significant findings. Dolor abdominal: Instrucciones de cuidado - [ Abdominal Pain: Care Instructions ]  Instrucciones de cuidado    El dolor abdominal tiene muchas causas posibles. Algunas de ellas no son graves y mejoran por sí solas en unos días. Otras requieren [place] y [place]. Si garcía dolor continúa o [place], necesitará lazarus nueva revisión y [place] pruebas para determinar qué pasa. Es posible que necesite cirugía para corregir el problema. No ignore nuevos síntomas, trey fiebre, náuseas y [place], [facility], dolor que [place] o [place]. Podrían ser señales de un problema más grave. García médico puede haberle recomendado lazarus consulta de Jake Kaban las 8 o 12 horas siguientes. Si no se siente mejor, es posible que requiera [place] o [place]. El médico lo fisher revisado minuciosamente, cristina puede jose f problemas más tarde. Si nota algún problema o síntomas nuevos, busque tratamiento médico inmediatamente. La atención de seguimiento es lazarus parte clave de garcía tratamiento y seguridad. Asegúrese de hacer y acudir a todas las citas, y llame a garcía médico si está teniendo problemas.  También es done lazarus buena idea saber los resultados de los exámenes y mantener lazarus lista de los medicamentos que terrie. ¿Cómo puede cuidarse en el hogar? · Descanse hasta que se sienta mejor. · Para prevenir la deshidratación, bing abundantes líquidos, suficientes para que garcía orina sea de color amarillo anish o transparente trey el agua. Elija beber agua y otros líquidos ely sin cafeína hasta que se sienta mejor. Si tiene [place] [facility], del corazón o del hígado y tiene que [place]'s líquidos, hable con garcía médico antes de aumentar garcía consumo. · Si tiene [employer], coma alimentos suaves, trey arroz, pan jolynn seco o galletas saladas, bananas (plátanos) y puré de [place]chari. Trate de comer varias comidas pequeñas al día en lugar de dos o kelly grandes. · Espere hasta 48 horas después de que todos los síntomas hayan desaparecido antes de comer alimentos condimentados, alcohol y bebidas que contengan cafeína. · No consuma alimentos ricos en grasa. · Evite medicamentos antiinflamatorios trey aspirina, ibuprofeno (Advil, Motrin) y naproxeno (Aleve). Pueden causar [employer]. Dígale a garcía médico si está tomando aspirina diariamente debido a otro problema de jean-pierre. ¿Cuándo debe pedir ayuda? Llame al 911 en cualquier momento que considere que necesita atención de emergencia. Por ejemplo, llame si:  · Se desmayó (perdió el conocimiento). · Las heces son de color rojizo o muy sanguinolentas (con chet). · Vomita chet o algo parecido a granos de café molido. · Tiene dolor abdominal nuevo e intenso. Llame a garcía médico ahora mismo o busque atención médica inmediata si:  · García dolor empeora, sobre todo si se concentra en lazarus denise parte del vientre. · Vuelve a tener fiebre o tiene fiebre más radha. · Noris heces son negruzcas y parecidas al alquitrán o tienen rastros de [place]. · Tiene sangrado vaginal inesperado. · Tiene síntomas de lazarus infección del tracto urinario.  Estos podrían incluir:  ¨ Dolor al [place]. ¨ Orinar con más frecuencia que lo habitual.  ¨ Percy en la Nathan carlson. · Siente mareos o aturdimiento, o que está a punto de [place]. Preste especial atención a los cambios en garcía jean-pierre y asegúrese de comunicarse con garcía médico si:  · No está mejorando después de 1 día (24 horas). ¿Dónde puede encontrar más información en inglés? Camelia Marie a http://negrita-loren.info/. Ninfa King J115 en la búsqueda para aprender más acerca de \"Dolor abdominal: Instrucciones de cuidado - [ Abdominal Pain: Care Instructions ]. \"  Revisado: 27 barron, 2016  Versión del contenido: 11.1  © 4324-8016 Healthwise, Incorporated. Las instrucciones de cuidado fueron adaptadas bajo licencia por [facility] (which disclaims liability or warranty for this information). Si usted tiene [place] afección médica o sobre estas instrucciones, siempre pregunte a garcía profesional de jean-pierre. Healthwise, Incorporated niega toda garantía o responsabilidad por garcía uso de esta información. Náuseas y vómito: Instrucciones de cuidado - [ Nausea and Vomiting: Care Instructions ]  Instrucciones de cuidado    Cuando tiene náuseas, podría sentirse débil y sudoroso y notar mucha saliva en garcía boca. Las náuseas suelen [employer]. La mayoría de las veces no hay que preocuparse por las náuseas y [facility] [facility], cristina estos pueden ser signos de [employer] [facility]. Dos causas comunes de náuseas y vómito son la gastroenteritis viral y la intoxicación por alimentos. Las náuseas y el vómito por gastroenteritis viral, por lo general, empiezan a mejorar en unas 24 horas. Las náuseas y el vómito debido a intoxicación por alimentos podrían durar de 12 a 48 horas. El médico lo ha revisado minuciosamente, cristina puede desarrollar problemas más tarde. Si nota algún problema o síntomas nuevos, busque tratamiento médico inmediatamente. La atención de seguimiento es lazarus parte clave de garcía tratamiento y seguridad.  Asegúrese de hacer y acudir a todas las citas, y llame a garcía médico si está teniendo problemas. También es lazarus buena idea saber los resultados de los exámenes y mantener lazarus lista de los medicamentos que terrie. ¿Cómo puede cuidarse en el hogar? · Para prevenir la deshidratación, bing abundantes líquidos, suficientes para que garcía orina sea de color amarillo anish o chino trey el agua. Opte por beber agua y otros líquidos ely sin cafeína hasta que se sienta mejor. Si tiene lazarus enfermedad del riñón, del corazón o del hígado y tiene que [place] líquidos, hable con garcía médico antes de aumentar garcía consumo. · Permanezca en la cama hasta que se sienta mejor. · Cuando pueda comer, empiece a consumir sopas claras (caldos), alimentos suaves y líquidos hasta que todos los síntomas hayan desaparecido por un período de 12 a 48 horas. Otras elecciones buenas incluyen pan jolynn seco, galletas saladas, cereal cocido y postre de gelatina, trey Jell-O.  ¿Cuándo debe pedir ayuda? Llame al 911 toda vez que piense que puede necesitar atención de emergencia. Por ejemplo, llame si:  · Se desmayó (perdió el conocimiento). Llame a garcía médico ahora mismo o busque atención médica inmediata si:  · Tiene síntomas de deshidratación, tales trey:  ¨ Ojos secos y boca seca. ¨ Orina solo poca cantidad de color oscuro. ¨ Tiene más sed de lo normal.  · Tiene dolor abdominal nuevo o que empeora. · Tiene fiebre nueva o que aumenta. · Vomita chet o algo parecido a granos de café molido. Preste especial atención a los cambios en garcía jean-pierre y asegúrese de comunicarse con garcía médico si:  · Tiene náusea y vómito continuos. · El vómito está empeorando. · El vómito dura más de 2 días. · No mejora trey se esperaba. ¿Dónde puede encontrar más información en inglés? Yakima Faith a http://negrita-loren.info/.   Escriba H591 en la búsqueda para aprender más acerca de \"Náuseas y vómito: Instrucciones de cuidado - [ Nausea and Vomiting: Care Instructions ].\"  Revisado: [address] [place], 2016  Versión del contenido: 11.1  © 2482-0170 Healthwise, Incorporated. Las instrucciones de cuidado fueron adaptadas bajo licencia por [employer] (which disclaims liability or warranty for this information). Si usted tiene [place] afección médica o sobre estas instrucciones, siempre pregunte a garcía profesional de jean-pierre. Healthwise, Incorporated niega toda garantía o responsabilidad por garcía uso de esta información.

## 2019-03-12 NOTE — TELEPHONE ENCOUNTER
----- Message from Maryam Carpenter sent at 3/11/2019  6:22 PM EDT -----  Regarding: Dr. Jina Taylor  Pt requesting to speak with the nurse in regards to $800 procedure to help with birth control. Best contact number [phone]  required.

## 2019-04-12 ENCOUNTER — OFFICE VISIT (OUTPATIENT)
Dept: FAMILY MEDICINE CLINIC | Age: 29
End: 2019-04-12

## 2019-04-12 VITALS
OXYGEN SATURATION: 97 % | WEIGHT: 172 LBS | DIASTOLIC BLOOD PRESSURE: 64 MMHG | SYSTOLIC BLOOD PRESSURE: 99 MMHG | HEART RATE: 64 BPM | RESPIRATION RATE: 16 BRPM | BODY MASS INDEX: 31.65 KG/M2 | HEIGHT: 62 IN | TEMPERATURE: 98.7 F

## 2019-04-12 DIAGNOSIS — M62.830 BACK SPASM: Primary | ICD-10-CM

## 2019-04-12 DIAGNOSIS — M54.59 MECHANICAL LOW BACK PAIN: ICD-10-CM

## 2019-04-12 RX ORDER — LIDOCAINE HYDROCHLORIDE 10 MG/ML
2 INJECTION INFILTRATION; PERINEURAL ONCE
Qty: 2 ML | Refills: 0
Start: 2019-04-12 | End: 2019-04-12

## 2019-04-12 RX ORDER — DEXAMETHASONE SODIUM PHOSPHATE 4 MG/ML
4 INJECTION, SOLUTION INTRA-ARTICULAR; INTRALESIONAL; INTRAMUSCULAR; INTRAVENOUS; SOFT TISSUE ONCE
Qty: 1 VIAL | Refills: 0
Start: 2019-04-12 | End: 2019-04-12

## 2019-04-12 NOTE — PROGRESS NOTES
Subjective:   History: This patient is a 30 y.o. Ukrainian-speaking female with a chief complaint of back pain. Back pain started during pregnancy and worsened after giving birth on 1/17/2019. Notes that she had an epidural for the birth. Since then, the pain has waxed and waned but does not completely go away. Reports sharp pain in the Left low back radiating to lateral left hip. At times she gets severe 10/10 pain radiating down the lateral thigh. Currently the pain is 6-7/10 in severity. Worse with lying down or sitting for a long time. Was given back exercises by her doctor (e.g., leg lifts, squats), which make the pain worse. Has also tried Tylenol, [employer] Notes some numbness and tingling in the LLE. No bowel or bladder incontinence. No fever, night sweats, or weight changes. No saddle anesthesia. Review of Systems:  General/Constitutional:  No fever, chills, sweats, fatigue, night sweats, weakness, weight loss or weight gain   Head: No headache, no trauma   Neck: No swelling, masses, stiffness, pain, or limited movement   Cardiac: No chest pain   Respiratory: No cough, shortness of breath, or dyspnea on exertion   GI: +Chronic constipation. No incontinence. No nausea/vomiting, diarrhea, abdominal pain, bloody or dark stools  : No incontinence. No change in urinary habits. Peripheral Vascular: No edema, coldness, numbness, discoloration, pain, or paresthesias   Musculoskeletal: As per HPI  Neurological: No saddle distribution paresthesia. No loss of consciousness, dizziness, seizures, dysarthria, cognitive changes, problems with balance, or unilateral weakness. Past Medical History:   Diagnosis Date    Cholestasis during pregnancy 2018    Cholestasis of pregnancy in third trimester 11/20/2015    Constipation     Gestational diabetes 9/25/2015    Hypertension     possibly per pt report in [place].  Denied it 5/5/15     Family History   Problem Relation Age of Onset    Diabetes Mother Current Outpatient Medications   Medication Sig Dispense Refill    polyethylene glycol (MIRALAX) 17 gram packet Take 1 Packet by mouth daily as needed. 30 Each 0    ibuprofen (MOTRIN) 800 mg tablet Take 1 Tab by mouth every eight (8) hours. 20 Tab 0    raNITIdine (ZANTAC) 150 mg tablet Take 2 Tabs by mouth two (2) times a day.  60 Tab 3     No Known Allergies  Social History     Socioeconomic History    Marital status: SINGLE     Spouse name: Not on file    Number of children: Not on file    Years of education: Not on file    Highest education level: Not on file   Occupational History    Not on file   Social Needs    Financial resource strain: Not on file    Food insecurity:     Worry: Not on file     Inability: Not on file    Transportation needs:     Medical: Not on file     Non-medical: Not on file   Tobacco Use    Smoking status: Never Smoker    Smokeless tobacco: Never Used   Substance and Sexual Activity    Alcohol use: No    Drug use: No    Sexual activity: Yes     Partners: Male     Birth control/protection: None, Condom   Lifestyle    Physical activity:     Days per week: Not on file     Minutes per session: Not on file    Stress: Not on file   Relationships    Social connections:     Talks on phone: Not on file     Gets together: Not on file     Attends Congregational service: Not on file     Active member of club or organization: Not on file     Attends meetings of clubs or organizations: Not on file     Relationship status: Not on file    Intimate partner violence:     Fear of current or ex partner: Not on file     Emotionally abused: Not on file     Physically abused: Not on file     Forced sexual activity: Not on file   Other Topics Concern     Service Not Asked    Blood Transfusions Not Asked    Caffeine Concern Not Asked    Occupational Exposure Not Asked   Freida Pinna Hazards Not Asked    Sleep Concern Not Asked    Stress Concern Not Asked    Weight Concern Not Asked    Special Diet Not Asked    Back Care Not Asked    Exercise Not Asked    Bike Helmet Not Asked    Seat Belt Not Asked    Self-Exams Not Asked   Social History Narrative    ** Merged History Encounter **         ** Merged History Encounter **          Objective:     Visit Vitals  BP 99/64 (BP 1 Location: Right arm, BP Patient Position: Sitting)   Pulse 64   Temp 98.7 °F (37.1 °C) (Oral)   Resp 16   Ht 5' 2\" (1.575 m)   Wt 172 lb (78 kg)   SpO2 97%   BMI 31.46 kg/m²       General: Alert and oriented and in no acute distress. Responds to all questions appropriately. LUNGS: Respirations unlabored. Skin: No obvious rash. MSK:    Posture: Normal   Deformity: None    ROM:     Flexion: Normal, painful    Extension: Normal, painful     Gait: Normal       Palpation:    L1-L5: No tenderness    Sacrum: No tenderness    Coccyx: No tenderness    Left Paraspinal: +Tenderness    Right Paraspinal: No tenderness     Strength (0-5/5)    Hip Flexion:   Left: 5/5  Right: 5/5    Hip Abduction:  Left: 5/5  Right: 5/5    Knee Extension:  Left: 5/5  Right: 5/5    Ankle dorsiflexion:  Left: 5/5  Right: 5/5    Ankle plantarflexion:  Left: 5/5  Right: 5/5    Great toe extension:  Left: 5/5  Right: 5/5     Sensation: Intact, no deficits      DTR:    Patella:  Left: +2  Right: +2    Achilles:  Left: +2  Right: +2     Special test:    Straight leg: Left: Negative  Right: Negative    Consuelos: Left: Negative  Right: Negative    Piriformis: Left: Negative  Right: Negative      Procedure:  Informed consent - Risks, benefits and alternatives discussed. Time Out - Performed, cross checking patient ID and procedure. Preparation - Cleaned and prepped with alcohol swab x3. Anesthesia - Ethyl chloride spray used to anesthetize the skin prior to injection.    Description of procedure - 1 trigger point identified in the left lumbar paraspinal muscle and each injected with 0.5 ml dexamethasone and 2 ml of 1% lidocaine mixture under sterile conditions. Patient tolerated the procedure well and there were no complications. Patient reports pain relief following the injections. Assessment:   Back spasm  Mechanical low back pain, myofascial pain    Plan:   1. Continue Home Exercise Program as per handout. Referred to PT. 2. Trigger point injection to left lumbar paraspinal muscle performed in clinic today. 3. Ice 15 minutes, three times a day PRN and after exercise. Can alternate with heat for 15 minutes. Medications:    1. Naproxen (Aleve): 220mg 1-2 tablets twice a day PRN. 2. Acetaminophen (Tylenol):  500mg 1-2 tablets every 6 hours as needed for pain.     RTC: 4-6 weeks

## 2019-04-12 NOTE — PROGRESS NOTES
Identified Patient with two Patient identifiers (Name and ). Two Patient Identifiers confirmed. Reviewed record in preparation for visit and have obtained necessary documentation. Chief Complaint   Patient presents with    Back Pain     c/o continuous mid lower left side back pain with radiation to left hip and leg. Taking Tylenol as needed with slight relief. States back pain begin shortly after last childbirth/delivery Compete  [id number]       Visit Vitals  BP 99/64 (BP 1 Location: Right arm, BP Patient Position: Sitting)   Pulse 64   Temp 98.7 °F (37.1 °C) (Oral)   Resp 16   Ht 5' 2\" (1.575 m)   Wt 172 lb (78 kg)   SpO2 97%   BMI 31.46 kg/m²       1. Have you been to the ER, urgent care clinic since your last visit? Hospitalized since your last visit? No    2. Have you seen or consulted any other health care providers outside of the [address] [place] [address] since your last visit? Include any pap smears or colon screening.  No

## 2019-09-27 ENCOUNTER — OFFICE VISIT (OUTPATIENT)
Dept: FAMILY MEDICINE CLINIC | Age: 29
End: 2019-09-27

## 2019-09-27 VITALS
BODY MASS INDEX: 30.18 KG/M2 | TEMPERATURE: 98.3 F | HEART RATE: 99 BPM | HEIGHT: 62 IN | OXYGEN SATURATION: 98 % | SYSTOLIC BLOOD PRESSURE: 112 MMHG | DIASTOLIC BLOOD PRESSURE: 70 MMHG | WEIGHT: 164 LBS | RESPIRATION RATE: 18 BRPM

## 2019-09-27 DIAGNOSIS — K59.00 CONSTIPATION, UNSPECIFIED CONSTIPATION TYPE: ICD-10-CM

## 2019-09-27 DIAGNOSIS — R10.2 PELVIC PAIN: Primary | ICD-10-CM

## 2019-09-27 LAB
BILIRUB UR QL STRIP: NEGATIVE
GLUCOSE UR-MCNC: NEGATIVE MG/DL
HCG URINE, QL. (POC): NEGATIVE
KETONES P FAST UR STRIP-MCNC: NEGATIVE MG/DL
PH UR STRIP: 5.5 [PH] (ref 4.6–8)
PROT UR QL STRIP: NEGATIVE
SP GR UR STRIP: 1.02 (ref 1–1.03)
UA UROBILINOGEN AMB POC: NORMAL (ref 0.2–1)
URINALYSIS CLARITY POC: CLEAR
URINALYSIS COLOR POC: YELLOW
URINE BLOOD POC: NEGATIVE
URINE LEUKOCYTES POC: NEGATIVE
URINE NITRITES POC: NEGATIVE
VALID INTERNAL CONTROL?: YES

## 2019-09-27 RX ORDER — POLYETHYLENE GLYCOL 3350 17 G/17G
17 POWDER, FOR SOLUTION ORAL DAILY
Qty: 30 EACH | Refills: 3 | Status: SHIPPED | OUTPATIENT
Start: 2019-09-27

## 2019-09-27 RX ORDER — ACETAMINOPHEN 325 MG/1
TABLET ORAL
COMMUNITY

## 2019-09-27 RX ORDER — DOCUSATE SODIUM 100 MG/1
100 CAPSULE, LIQUID FILLED ORAL 2 TIMES DAILY
Qty: 60 CAP | Refills: 2 | Status: SHIPPED | OUTPATIENT
Start: 2019-09-27 | End: 2019-12-26

## 2019-09-27 NOTE — PROGRESS NOTES
[address] [place] [address]    Subjective:     CC: pelvic pain     History provided by patient     Narendra Ferguson is a 30 y.o. female coming in for an acute complain of pelvic pain:     Pt states that pain has been ongoing for 2 weeks. She believes this is a cyst and that she was diagnosed with a ovarian cyst 2 years ago at which time she was having similar pain. Pain is located on the L pelvic region. States sometimes it is a 10/10 in intensity and makes her cry. No blood in urine. Pt is sexually active with her  of 7 years. Denies any abnormal vaginal discharge. No pain with urination. LMP sometime in august, pt unsure of date. Pt has had similar pain and has had multiple US in the past with no pertinent finding. Current Outpatient Medications on File Prior to Visit   Medication Sig Dispense Refill    acetaminophen (TYLENOL) 325 mg tablet Take  by mouth every four (4) hours as needed for Pain.  ibuprofen (MOTRIN) 800 mg tablet Take 1 Tab by mouth every eight (8) hours. 20 Tab 0    raNITIdine (ZANTAC) 150 mg tablet Take 2 Tabs by mouth two (2) times a day. 60 Tab 3    polyethylene glycol (MIRALAX) 17 gram packet Take 1 Packet by mouth daily as needed. 30 Each 0     No current facility-administered medications on file prior to visit. Patient Active Problem List   Diagnosis Code    Obesity (BMI 30.0-34. 9) E66.9    Poor weight gain of pregnancy O26.10    Cholestasis of pregnancy in third trimester O26.613, K83.1    Elevated transaminase level R74.0    Intrahepatic cholestasis of pregnancy O26.619, K83.1    Maternal obesity affecting pregnancy, antepartum O99.210    Acid reflux K21.9    Constipation in pregnancy O99.619, K59.00    Normal labor O80, Z37.9       Social History     Socioeconomic History    Marital status: SINGLE     Spouse name: Not on file    Number of children: Not on file    Years of education: Not on file    Highest education level: Not on file   Occupational History    Not on file   Social Needs    Financial resource strain: Not on file    Food insecurity:     Worry: Not on file     Inability: Not on file    Transportation needs:     Medical: Not on file     Non-medical: Not on file   Tobacco Use    Smoking status: Never Smoker    Smokeless tobacco: Never Used   Substance and Sexual Activity    Alcohol use: No    Drug use: No    Sexual activity: Yes     Partners: Male     Birth control/protection: None, Condom   Lifestyle    Physical activity:     Days per week: Not on file     Minutes per session: Not on file    Stress: Not on file   Relationships    Social connections:     Talks on phone: Not on file     Gets together: Not on file     Attends Orthodoxy service: Not on file     Active member of club or organization: Not on file     Attends meetings of clubs or organizations: Not on file     Relationship status: Not on file    Intimate partner violence:     Fear of current or ex partner: Not on file     Emotionally abused: Not on file     Physically abused: Not on file     Forced sexual activity: Not on file   Other Topics Concern     Service Not Asked    Blood Transfusions Not Asked    Caffeine Concern Not Asked    Occupational Exposure Not Asked   Velora Elizabeth Hazards Not Asked    Sleep Concern Not Asked    Stress Concern Not Asked    Weight Concern Not Asked    Special Diet Not Asked    Back Care Not Asked    Exercise Not Asked    Bike Helmet Not Asked    Seat Belt Not Asked    Self-Exams Not Asked   Social History Narrative    ** Merged History Encounter **         ** Merged History Encounter **          Review of Systems   Constitutional: Negative for chills and fever. HENT: Negative for congestion and sore throat. Respiratory: Negative for cough and shortness of breath. Cardiovascular: Negative for chest pain and palpitations. Gastrointestinal: Negative for diarrhea, nausea and vomiting.    Genitourinary: Negative for dysuria and urgency. Psychiatric/Behavioral: Negative for depression and suicidal ideas. Objective:     Visit Vitals  /70 (BP 1 Location: Right arm, BP Patient Position: Sitting)   Pulse 99   Temp 98.3 °F (36.8 °C) (Oral)   Resp 18   Ht 5' 2\" (1.575 m)   Wt 164 lb (74.4 kg)   LMP 08/22/2019 (Exact Date)   SpO2 98%   Breastfeeding? No   BMI 30.00 kg/m²          Physical Exam   Constitutional: She is oriented to person, place, and time. She appears well-developed and well-nourished. Neck: Normal range of motion. Neck supple. Cardiovascular: Normal rate and regular rhythm. Exam reveals no friction rub. No murmur heard. Pulmonary/Chest: Effort normal and breath sounds normal.   Abdominal: Soft. Bowel sounds are normal. She exhibits no distension. There is no guarding. Pain with deep palpation in L pelvic region. No suprapubic tenderness. Genitourinary: Vagina normal and uterus normal.   Genitourinary Comments: No cervical motion tenderness. Neurological: She is alert and oriented to person, place, and time. Skin: Skin is warm and dry. Assessment and orders:       Pelvic pain: Pt with L sided pelvic pain and has hx of similar pain with multiple US negative. Also significant constipation which could be a factor in pain. Will treat the constipation and obtain further lab work. If pain does not improve with constipation treatment will consider repeat imaging.   - acetaminophen (TYLENOL) 325 mg tablet; Take  by mouth every four (4) hours as needed for Pain.  - AMB POC URINE PREGNANCY TEST, VISUAL COLOR COMPARISON  - AMB POC URINALYSIS DIP STICK AUTO W/O MICRO  - CHLAMYDIA/GC PCR    Constipation, unspecified constipation type  - docusate sodium (COLACE) 100 mg capsule; Take 1 Cap by mouth two (2) times a day for 90 days. Dispense: 60 Cap; Refill: 2  - polyethylene glycol (MIRALAX) 17 gram packet; Take 1 Packet by mouth daily.   Dispense: 30 Each; Refill: 3    I have discussed the diagnosis with the patient and the intended plan as seen in the above orders. Social history, medical history, and labs were reviewed. The patient has received an after-visit summary and questions were answered concerning future plans. I have discussed medication side effects and warnings with the patient as well.     Malini Anglin DO  Resident [facility]  09/27/19    Cased was discussed with Dr. Rosemary Hernandez (attending physician)

## 2019-09-27 NOTE — PROGRESS NOTES
Identified Patient with two Patient identifiers (Name and ). Two Patient Identifiers confirmed. Reviewed record in preparation for visit and have obtained necessary documentation. Chief Complaint   Patient presents with    Abdominal Pain     LLQ pain/pelvic pain x 2 weeks       Visit Vitals  /70 (BP 1 Location: Right arm, BP Patient Position: Sitting)   Pulse 99   Temp 98.3 °F (36.8 °C) (Oral)   Resp 18   Ht 5' 2\" (1.575 m)   Wt 164 lb (74.4 kg)   SpO2 98%   Breastfeeding? No   BMI 30.00 kg/m²       1. Have you been to the ER, urgent care clinic since your last visit? Hospitalized since your last visit? No    2. Have you seen or consulted any other health care providers outside of the [address] [place] [address] since your last visit? Include any pap smears or colon screening.  No

## 2019-10-02 LAB
C TRACH RRNA SPEC QL NAA+PROBE: NEGATIVE
N GONORRHOEA RRNA SPEC QL NAA+PROBE: NEGATIVE

## 2020-07-29 NOTE — PATIENT INSTRUCTIONS
Constipation: Care Instructions  Your Care Instructions    Constipation means that you have a hard time passing stools (bowel movements). People pass stools from 3 times a day to once every 3 days. What is normal for you may be different. Constipation may occur with pain in the rectum and cramping. The pain may get worse when you try to pass stools. Sometimes there are small amounts of bright red blood on toilet paper or the surface of stools. This is because of enlarged veins near the rectum (hemorrhoids). A few changes in your diet and lifestyle may help you avoid ongoing constipation. Your doctor may also prescribe medicine to help loosen your stool. Some medicines can cause constipation. These include pain medicines and antidepressants. Tell your doctor about all the medicines you take. Your doctor may want to make a medicine change to ease your symptoms. Follow-up care is a key part of your treatment and safety. Be sure to make and go to all appointments, and call your doctor if you are having problems. It's also a good idea to know your test results and keep a list of the medicines you take. How can you care for yourself at home? · Drink plenty of fluids, enough so that your urine is light yellow or clear like water. If you have kidney, heart, or liver disease and have to limit fluids, talk with your doctor before you increase the amount of fluids you drink. · Include high-fiber foods in your diet each day. These include fruits, vegetables, beans, and whole grains. · Get at least 30 minutes of exercise on most days of the week. Walking is a good choice. You also may want to do other activities, such as running, swimming, cycling, or playing tennis or team sports. · Take a fiber supplement, such as Citrucel or Metamucil, every day. Read and follow all instructions on the label. · Schedule time each day for a bowel movement. A daily routine may help.  Take your time having your bowel EMERGENCY DEPARTMENT HISTORY AND PHYSICAL EXAM    Date: 7/29/2020  Patient Name: Chandler Neff    History of Presenting Illness     Chief Complaint   Patient presents with    Suture Removal         History Provided By:patient    Chief Complaint: suture removal   Duration: 1 week   Timing: acute  Location: R foot  Quality: none   Severity: mild  Modifying Factors: none   Associated Symptoms: none       Additional History (Context): Chandler Neff is a 33 y.o. male with no documented PMH who presents to the ED requesting his sutures to be removed from the R foot placed 1 week ago. Denies any pain or drainage from the site. No other complaints. PCP: None        Past History     Past Medical History:  History reviewed. No pertinent past medical history. Past Surgical History:  History reviewed. No pertinent surgical history. Family History:  History reviewed. No pertinent family history. Social History:  Social History     Tobacco Use    Smoking status: Current Every Day Smoker     Packs/day: 0.25    Smokeless tobacco: Never Used   Substance Use Topics    Alcohol use: Yes    Drug use: Not on file       Allergies: Allergies   Allergen Reactions    Pcn [Penicillins] Hives         Review of Systems   Review of Systems   Constitutional: Negative. Negative for chills and fever. HENT: Negative. Negative for congestion, ear pain and rhinorrhea. Eyes: Negative. Negative for pain and redness. Respiratory: Negative. Negative for cough, shortness of breath, wheezing and stridor. Cardiovascular: Negative. Negative for chest pain and leg swelling. Gastrointestinal: Negative. Negative for abdominal pain, constipation, diarrhea, nausea and vomiting. Genitourinary: Negative. Negative for dysuria and frequency. Musculoskeletal: Negative. Negative for back pain and neck pain. Skin: Positive for wound. Negative for rash. Neurological: Negative.   Negative for dizziness, seizures, syncope and headaches. All other systems reviewed and are negative. All Other Systems Negative  Physical Exam     Vitals:    07/29/20 1637   BP: 107/64   Pulse: 82   Resp: 20   Temp: 97.5 °F (36.4 °C)   SpO2: 100%     Physical Exam  Vitals signs and nursing note reviewed. Constitutional:       General: He is not in acute distress. Appearance: He is well-developed. He is not diaphoretic. HENT:      Head: Normocephalic and atraumatic. Eyes:      General: No scleral icterus. Right eye: No discharge. Left eye: No discharge. Conjunctiva/sclera: Conjunctivae normal.   Neck:      Musculoskeletal: Normal range of motion and neck supple. Cardiovascular:      Rate and Rhythm: Normal rate. Heart sounds: No friction rub. Pulmonary:      Effort: Pulmonary effort is normal. No respiratory distress. Breath sounds: No stridor. Musculoskeletal: Normal range of motion. Skin:     General: Skin is warm and dry. Findings: No erythema or rash. Comments: Small laceration to the dorsum of the R foot, 2 sutures in place, good wound healing. Neurological:      Mental Status: He is alert and oriented to person, place, and time. Coordination: Coordination normal.      Comments: Gait is steady and patient exhibits no evidence of ataxia. Patient is able to ambulate without difficulty. No focal neurological deficit noted. No facial droop, slurred speech, or evidence of altered mentation noted on exam.     Psychiatric:         Behavior: Behavior normal.         Thought Content: Thought content normal.                Diagnostic Study Results     Labs -   No results found for this or any previous visit (from the past 12 hour(s)). Radiologic Studies -   No orders to display     CT Results  (Last 48 hours)    None        CXR Results  (Last 48 hours)    None            Medical Decision Making   I am the first provider for this patient.     I reviewed the vital signs, available nursing notes, movement. · Support your feet with a small step stool when you sit on the toilet. This helps flex your hips and places your pelvis in a squatting position. · Your doctor may recommend an over-the-counter laxative to relieve your constipation. Examples are Milk of Magnesia and MiraLax. Read and follow all instructions on the label. Do not use laxatives on a long-term basis. When should you call for help? Call your doctor now or seek immediate medical care if:    · You have new or worse belly pain.     · You have new or worse nausea or vomiting.     · You have blood in your stools.    Watch closely for changes in your health, and be sure to contact your doctor if:    · Your constipation is getting worse.     · You do not get better as expected. Where can you learn more? Go to http://negrita-loren.info/. Enter 21 [phone] in the search box to learn more about \"Constipation: Care Instructions. \"  Current as of: June 26, 2019  Content Version: 12.2  © 2053-9693 [employer]. Care instructions adapted under license by [employer] (which disclaims liability or warranty for this information). If you have questions about a medical condition or this instruction, always ask your healthcare professional. [address] any warranty or liability for your use of this information. past medical history, past surgical history, family history and social history. Vital Signs-Reviewed the patient's vital signs. Records Reviewed: [place], [place]     Procedures:  Suture/Staple Removal    Date/Time: 7/29/2020 5:14 PM  Performed by: Teja Ocasio  Authorized by: Teja Ocasio     Consent:     Consent obtained:  Verbal    Consent given by:  Patient  Location:     Location: R foot   Procedure details:     Wound appearance:  No signs of infection and good wound healing    Number of sutures removed:  2  Post-procedure details:     Post-removal:  No dressing applied    Patient tolerance of procedure: Tolerated well, no immediate complications        Provider Notes (Medical Decision Making): Impression:  Suture removal    Sutures removed  Will plan to d/c. Nuria Mei PA-C     MED RECONCILIATION:  No current facility-administered medications for this encounter. Current Outpatient Medications   Medication Sig    nitroglycerin (NITROSTAT) 0.4 mg SL tablet Take 1 Tab by mouth every five (5) minutes as needed for Chest Pain. Sit/Lay down then put one tab under the tongue every 5 minutes as needed for chest pain for 3 doses    simvastatin (ZOCOR) 10 mg tablet Take 1 Tab by mouth nightly. Disposition:  D/c    DISCHARGE NOTE:   Patient is stable for discharge at this time. I have discussed all the findings from today's work up with the patient, including lab results and imaging. I have answered all questions. No new rx given. Rest and close follow-up with the PCP recommended this week. Return to the ED immediately for any new or worsening symptoms.   Nuria Mei PA-C     Follow-up Information     Follow up With Specialties Details Why [address]   As needed [address]  Johnny Ambrose [address] [facility] Emergency Medicine  As needed [address] [facility] [address]  [phone]          Current Discharge Medication List      START taking these medications    Details   nitroglycerin (NITROSTAT) 0.4 mg SL tablet Take 1 Tab by mouth every five (5) minutes as needed for Chest Pain. Sit/Lay down then put one tab under the tongue every 5 minutes as needed for chest pain for 3 doses  Qty: 1 Bottle, Refills: 0      simvastatin (ZOCOR) 10 mg tablet Take 1 Tab by mouth nightly. Qty: 30 Tab, Refills: 0                   Diagnosis     Clinical Impression:   1.  Visit for suture removal

## 2021-09-08 NOTE — PROGRESS NOTES
Chief Complaint   Patient presents with   [address]     1. Have you been to the ER, urgent care clinic since your last visit? Hospitalized since your last visit? No    2. Have you seen or consulted any other health care providers outside of the [address] [place] [address] since your last visit? Include any pap smears or colon screening.  No independent

## 2022-03-18 PROBLEM — K21.9 ACID REFLUX: Status: ACTIVE | Noted: 2018-11-03

## 2022-03-19 PROBLEM — Z37.9 NORMAL LABOR: Status: ACTIVE | Noted: 2019-01-16

## 2022-03-19 PROBLEM — O99.619 CONSTIPATION IN PREGNANCY: Status: ACTIVE | Noted: 2018-11-03

## 2022-03-19 PROBLEM — K59.00 CONSTIPATION IN PREGNANCY: Status: ACTIVE | Noted: 2018-11-03

## 2022-03-19 PROBLEM — O99.210 MATERNAL OBESITY AFFECTING PREGNANCY, ANTEPARTUM: Status: ACTIVE | Noted: 2018-09-05

## 2022-12-23 ENCOUNTER — APPOINTMENT (OUTPATIENT)
Dept: GENERAL RADIOLOGY | Age: 32
End: 2022-12-23
Attending: PHYSICIAN ASSISTANT

## 2022-12-23 ENCOUNTER — HOSPITAL ENCOUNTER (EMERGENCY)
Age: 32
Discharge: HOME | End: 2022-12-24
Attending: EMERGENCY MEDICINE

## 2022-12-23 VITALS
SYSTOLIC BLOOD PRESSURE: 112 MMHG | DIASTOLIC BLOOD PRESSURE: 88 MMHG | WEIGHT: 168.65 LBS | BODY MASS INDEX: 31.04 KG/M2 | OXYGEN SATURATION: 100 % | HEIGHT: 62 IN | RESPIRATION RATE: 19 BRPM | HEART RATE: 86 BPM | TEMPERATURE: 98.4 F

## 2022-12-23 DIAGNOSIS — J22 LOWER RESPIRATORY INFECTION: Primary | ICD-10-CM

## 2022-12-23 LAB
COVID-19 RAPID TEST, COVR: NOT DETECTED
FLUAV AG NPH QL IA: NEGATIVE
FLUBV AG NOSE QL IA: NEGATIVE
SOURCE, COVRS: NORMAL

## 2022-12-23 PROCEDURE — 71045 X-RAY EXAM CHEST 1 VIEW: CPT

## 2022-12-23 PROCEDURE — 99284 EMERGENCY DEPT VISIT MOD MDM: CPT

## 2022-12-23 PROCEDURE — 87804 INFLUENZA ASSAY W/OPTIC: CPT

## 2022-12-23 PROCEDURE — 87635 SARS-COV-2 COVID-19 AMP PRB: CPT

## 2022-12-24 RX ORDER — DEXTROMETHORPHAN HYDROBROMIDE, GUAIFENESIN 20; 400 MG/20ML; MG/20ML
20 SOLUTION ORAL
Qty: 118 ML | Refills: 0 | Status: SHIPPED | OUTPATIENT
Start: 2022-12-24

## 2022-12-24 RX ORDER — AZITHROMYCIN 250 MG/1
TABLET, FILM COATED ORAL
Qty: 6 TABLET | Refills: 0 | Status: SHIPPED | OUTPATIENT
Start: 2022-12-24 | End: 2022-12-29

## 2022-12-24 RX ORDER — PREDNISONE 10 MG/1
TABLET ORAL
Qty: 21 TABLET | Refills: 0 | Status: SHIPPED | OUTPATIENT
Start: 2022-12-24

## 2022-12-24 RX ORDER — ALBUTEROL SULFATE 90 UG/1
2 AEROSOL, METERED RESPIRATORY (INHALATION)
Qty: 1 EACH | Refills: 0 | Status: SHIPPED | OUTPATIENT
Start: 2022-12-24

## 2022-12-24 NOTE — ED PROVIDER NOTES
EMERGENCY DEPARTMENT HISTORY AND PHYSICAL EXAM      Please note that this dictation was completed with [employer], the computer voice recognition software. Quite often unanticipated grammatical, syntax, homophones, and other interpretive errors are inadvertently transcribed by the computer software. Please disregard these errors. Please excuse any errors that have escaped final proofreading. Date: 12/23/2022  Patient Name: Amber Dougherty    History of Presenting Illness     Chief Complaint   Patient presents with    Cough     Non prod cough worsening x 2 weeks. No fever. Not covid and/or influ vaccinated. History Provided By: Patient    HPI: Amber Dougherty, 33 y.o. female with a past medical history as below presents ambulatory to the ED with cc of 2 weeks of mild but frequent aching of her chest that is associated with a nonproductive cough. There has been no chest pain, per se. There has been no shortness of breath. She has seen some, but no lasting improvement with OTC NyQuil. There are no known sick contacts. There has been no recent travel. She is unvaccinated against flu and COVID. She does not smoke cigarettes. She denies any history of asthma. There are no other complaints, changes, or physical findings at this time. PCP: Thanh Virk MD    Current Outpatient Medications   Medication Sig Dispense Refill    azithromycin (Zithromax Z-Keron) 250 mg tablet Take 2 tablets (500mg) on day 1; then take 1 tablet (250 mg) on days 2, 3, 4 and 5 6 Tablet 0    predniSONE (STERAPRED DS) 10 mg dose pack Per Dose Pack instructions 21 Tablet 0    albuterol (PROVENTIL HFA, VENTOLIN HFA, PROAIR HFA) 90 mcg/actuation inhaler Take 2 Puffs by inhalation every four (4) hours as needed for Wheezing. 1 Each 0    dextromethorphan-guaiFENesin (Robitussin Cough-Chest Levi DM) 5-100 mg/5 mL liqd Take 20 mL by mouth four (4) times daily as needed for Cough or Congestion.  118 mL 0    acetaminophen (TYLENOL) 325 mg tablet Take  by mouth every four (4) hours as needed for Pain.      polyethylene glycol (MIRALAX) 17 gram packet Take 1 Packet by mouth daily. 27 Each 3     Past History     Past Medical History:  Past Medical History:   Diagnosis Date    Cholestasis during pregnancy 2018    Cholestasis of pregnancy in third trimester 11/20/2015    Constipation     Gestational diabetes 9/25/2015    Hypertension     possibly per pt report in [place]. Denied it 5/5/15       Past Surgical History:  No past surgical history on file. Family History:  Family History   Problem Relation Age of Onset    Diabetes Mother        Social History:  Social History     Tobacco Use    Smoking status: Never    Smokeless tobacco: Never   Substance Use Topics    Alcohol use: No    Drug use: No       Allergies:  No Known Allergies  Review of Systems   Review of Systems   Constitutional:  Negative for fever. HENT:  Negative for sore throat. Eyes:  Negative for pain. Respiratory:  Positive for cough. Negative for shortness of breath. Cardiovascular:  Negative for chest pain. Gastrointestinal:  Negative for abdominal pain. Genitourinary:  Negative for flank pain. Musculoskeletal:  Negative for back pain. Skin:  Negative for rash. Neurological:  Negative for headaches. Physical Exam   Physical Exam  Vitals and nursing note reviewed. Constitutional:       General: She is not in acute distress. Appearance: She is well-developed. HENT:      Head: Normocephalic and atraumatic. Right Ear: External ear normal.      Left Ear: External ear normal.      Nose: Nose normal.      Mouth/Throat:      Mouth: Mucous membranes are moist.   Eyes:      Conjunctiva/sclera: Conjunctivae normal.      Pupils: Pupils are equal, round, and reactive to light. Cardiovascular:      Rate and Rhythm: Normal rate and regular rhythm. Pulmonary:      Effort: Pulmonary effort is normal. No respiratory distress.       Comments: Dry cough during examination  Breathing is unlabored and lungs are clear  Abdominal:      Palpations: Abdomen is soft. Tenderness: There is no abdominal tenderness. Musculoskeletal:         General: Normal range of motion. Cervical back: Full passive range of motion without pain and normal range of motion. Skin:     Findings: No rash. Neurological:      Mental Status: She is alert and oriented to person, place, and time. She is not disoriented. GCS: GCS eye subscore is 4. GCS verbal subscore is 5. GCS motor subscore is 6. Cranial Nerves: No cranial nerve deficit. Psychiatric:         Speech: Speech normal.     Diagnostic Study Results     Labs -     Recent Results (from the past 12 hour(s))   INFLUENZA A+B VIRAL AGS    Collection Time: 12/23/22 11:15 PM   Result Value Ref Range    Influenza A Antigen Negative NEG      Influenza B Antigen Negative NEG     COVID-19 RAPID TEST    Collection Time: 12/23/22 11:15 PM   Result Value Ref Range    Specimen source Nasopharyngeal      COVID-19 rapid test Not detected NOTD         Radiologic Studies -   XR CHEST PORT   Final Result   No acute process identified. CT Results  (Last 48 hours)      None          CXR Results  (Last 48 hours)                 12/23/22 2353  XR CHEST PORT Final result    Impression:  No acute process identified. Narrative:  Clinical history: cough x 2 weeks; PUI COVID   INDICATION:   cough x 2 weeks; PUI COVID   COMPARISON: [id number]       FINDINGS:   AP portable upright view of the chest demonstrates a stable  cardiopericardial   silhouette. There is no pleural effusion. .There is no focal consolidation. .There   is no pneumothorax. .                  Medical Decision Making   I am the first provider for this patient. I reviewed the vital signs, available nursing notes, past medical history, past surgical history, family history and social history.     Vital Signs-Reviewed the patient's vital signs. Patient Vitals for the past 12 hrs:   Temp Pulse Resp BP SpO2   12/23/22 2331 -- -- -- -- 100 %   12/23/22 2310 98.4 °F (36.9 °C) 86 19 112/88 100 %       Pulse Oximetry Analysis - 100% on RA    Records Reviewed: Nursing Notes, Old Medical Records, Previous Radiology Studies, and Previous Laboratory Studies    Provider Notes (Medical Decision Making):   DDx: Pneumonia, COVID-19, influenza, bronchitis    Testing for influenza and COVID are negative. Chest x-ray is negative. Given the duration of her symptoms with a worsening cough for 2 weeks, I will prescribe a macrolide with concerns about an atypical organisms. Will offer course of steroids and albuterol. Will offer Robitussin for cough. Refer to primary care. ED Course:   Initial assessment performed. The patients presenting problems have been discussed, and they are in agreement with the care plan formulated and outlined with them. I have encouraged them to ask questions as they arise throughout their visit. Disposition:  Discharge    PLAN:  1. Current Discharge Medication List        START taking these medications    Details   azithromycin (Zithromax Z-Keron) 250 mg tablet Take 2 tablets (500mg) on day 1; then take 1 tablet (250 mg) on days 2, 3, 4 and 5  Qty: 6 Tablet, Refills: 0  Start date: 12/24/2022, End date: 12/29/2022      predniSONE (STERAPRED DS) 10 mg dose pack Per Dose Pack instructions  Qty: 21 Tablet, Refills: 0  Start date: 12/24/2022      albuterol (PROVENTIL HFA, VENTOLIN HFA, PROAIR HFA) 90 mcg/actuation inhaler Take 2 Puffs by inhalation every four (4) hours as needed for Wheezing. Qty: 1 Each, Refills: 0  Start date: 12/24/2022      dextromethorphan-guaiFENesin (Robitussin Cough-Chest Levi DM) 5-100 mg/5 mL liqd Take 20 mL by mouth four (4) times daily as needed for Cough or Congestion. Qty: 118 mL, Refills: 0  Start date: 12/24/2022           2.    Follow-up Information       Follow up With Specialties Details Why Contact Info    Gisela Jones MD Family Medicine Call  PRIMARY CARE: as needed if symptoms persist [address]  [phone]            Return to ED if worse     Diagnosis     Clinical Impression:   1.  Lower respiratory infection

## 2022-12-24 NOTE — ED NOTES
DC info reviewed with Patient by Provider, all questions answered. Patient well-appearing at time of discharge, vital signs stable, no acute distress. Ambulatory out of ED at this time.

## 2025-07-26 ENCOUNTER — APPOINTMENT (OUTPATIENT)
Facility: HOSPITAL | Age: 35
End: 2025-07-26

## 2025-07-26 ENCOUNTER — HOSPITAL ENCOUNTER (EMERGENCY)
Facility: HOSPITAL | Age: 35
Discharge: HOME | End: 2025-07-26
Attending: EMERGENCY MEDICINE

## 2025-07-26 VITALS
OXYGEN SATURATION: 96 % | RESPIRATION RATE: 16 BRPM | DIASTOLIC BLOOD PRESSURE: 86 MMHG | TEMPERATURE: 97.5 F | SYSTOLIC BLOOD PRESSURE: 131 MMHG | HEART RATE: 90 BPM | WEIGHT: 205.03 LBS | HEIGHT: 61 IN | BODY MASS INDEX: 38.71 KG/M2

## 2025-07-26 DIAGNOSIS — R07.9 CHEST PAIN, UNSPECIFIED TYPE: ICD-10-CM
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
## 2025-07-26 DIAGNOSIS — M25.512 ACUTE PAIN OF LEFT SHOULDER: ICD-10-CM

## 2025-07-26 DIAGNOSIS — S16.1XXA STRAIN OF NECK MUSCLE, INITIAL ENCOUNTER: ICD-10-CM

## 2025-07-26 PROCEDURE — 72125 CT NECK SPINE W/O DYE: CPT

## 2025-07-26 PROCEDURE — 93005 ELECTROCARDIOGRAM TRACING: CPT

## 2025-07-26 PROCEDURE — 73030 X-RAY EXAM OF SHOULDER: CPT

## 2025-07-26 PROCEDURE — 96372 THER/PROPH/DIAG INJ SC/IM: CPT

## 2025-07-26 PROCEDURE — 6360000002 HC RX W HCPCS

## 2025-07-26 PROCEDURE — 6370000000 HC RX 637 (ALT 250 FOR IP)

## 2025-07-26 PROCEDURE — 71250 CT THORAX DX C-: CPT

## 2025-07-26 PROCEDURE — 99284 EMERGENCY DEPT VISIT MOD MDM: CPT

## 2025-07-26 RX ORDER — CYCLOBENZAPRINE HCL 10 MG
10 TABLET ORAL 3 TIMES DAILY PRN
Qty: 30 TABLET | Refills: 0 | Status: SHIPPED | OUTPATIENT
Start: 2025-07-26 | End: 2025-08-05

## 2025-07-26 RX ORDER — LIDOCAINE 4 G/G
1 PATCH TOPICAL
Status: DISCONTINUED | OUTPATIENT
Start: 2025-07-26 | End: 2025-07-26 | Stop reason: HOSPADM

## 2025-07-26 RX ORDER — CYCLOBENZAPRINE HCL 10 MG
10 TABLET ORAL ONCE
Status: COMPLETED | OUTPATIENT
Start: 2025-07-26 | End: 2025-07-26

## 2025-07-26 RX ORDER — NAPROXEN 500 MG/1
500 TABLET ORAL 2 TIMES DAILY WITH MEALS
Qty: 60 TABLET | Refills: 0 | Status: SHIPPED | OUTPATIENT
Start: 2025-07-26

## 2025-07-26 RX ORDER — KETOROLAC TROMETHAMINE 30 MG/ML
30 INJECTION, SOLUTION INTRAMUSCULAR; INTRAVENOUS ONCE
Status: COMPLETED | OUTPATIENT
Start: 2025-07-26 | End: 2025-07-26

## 2025-07-26 RX ORDER — LIDOCAINE 4 G/G
1 PATCH TOPICAL DAILY
Qty: 30 PATCH | Refills: 0 | Status: SHIPPED | OUTPATIENT
Start: 2025-07-26 | End: 2025-08-25

## 2025-07-26 RX ADMIN — KETOROLAC TROMETHAMINE 30 MG: 30 INJECTION, SOLUTION INTRAMUSCULAR; INTRAVENOUS at 17:52

## 2025-07-26 RX ADMIN — CYCLOBENZAPRINE 10 MG: 10 TABLET, FILM COATED ORAL at 17:53

## 2025-07-26 ASSESSMENT — PAIN DESCRIPTION - DESCRIPTORS
DESCRIPTORS: ACHING
DESCRIPTORS: SORE;ACHING

## 2025-07-26 ASSESSMENT — PAIN DESCRIPTION - LOCATION
LOCATION: NECK;BACK;CHEST
LOCATION: NECK

## 2025-07-26 ASSESSMENT — PAIN DESCRIPTION - ONSET: ONSET: SUDDEN

## 2025-07-26 ASSESSMENT — PAIN DESCRIPTION - ORIENTATION: ORIENTATION: MID;UPPER

## 2025-07-26 ASSESSMENT — PAIN SCALES - GENERAL: PAINLEVEL_OUTOF10: 8

## 2025-07-26 ASSESSMENT — PAIN - FUNCTIONAL ASSESSMENT: PAIN_FUNCTIONAL_ASSESSMENT: PREVENTS OR INTERFERES SOME ACTIVE ACTIVITIES AND ADLS

## 2025-07-26 ASSESSMENT — PAIN DESCRIPTION - FREQUENCY: FREQUENCY: CONTINUOUS

## 2025-07-26 ASSESSMENT — PAIN DESCRIPTION - PAIN TYPE: TYPE: ACUTE PAIN
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
## 2025-07-26 NOTE — CONSULTS
Session ID: [id number]  Session Duration: 11 minutes  Language: Bulgarian   ID: #[id number]   Name: Dayron
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
## 2025-07-27 LAB
EKG ATRIAL RATE: 68 BPM
EKG DIAGNOSIS: NORMAL
EKG P AXIS: 65 DEGREES
EKG P-R INTERVAL: 146 MS
EKG Q-T INTERVAL: 380 MS
EKG QRS DURATION: 88 MS
EKG QTC CALCULATION (BAZETT): 404 MS
EKG R AXIS: 79 DEGREES
EKG T AXIS: 66 DEGREES
EKG VENTRICULAR RATE: 68 BPM